# Patient Record
Sex: MALE | Race: WHITE | NOT HISPANIC OR LATINO | Employment: OTHER | URBAN - METROPOLITAN AREA
[De-identification: names, ages, dates, MRNs, and addresses within clinical notes are randomized per-mention and may not be internally consistent; named-entity substitution may affect disease eponyms.]

---

## 2023-10-05 PROBLEM — R77.9 ELEVATED SERUM PROTEIN LEVEL: Status: ACTIVE | Noted: 2021-01-11

## 2023-10-05 PROBLEM — R77.8 ELEVATED TOTAL PROTEIN: Status: ACTIVE | Noted: 2020-07-31

## 2023-10-05 PROBLEM — S06.0X0A CONCUSSION WITHOUT LOSS OF CONSCIOUSNESS: Status: ACTIVE | Noted: 2020-05-15

## 2023-10-05 PROBLEM — N02.9 BENIGN HEMATURIA: Status: ACTIVE | Noted: 2020-07-31

## 2023-10-05 PROBLEM — I10 HYPERTENSION: Status: ACTIVE | Noted: 2018-08-21

## 2023-10-05 PROBLEM — S13.4XXA WHIPLASH INJURY TO NECK: Status: ACTIVE | Noted: 2023-10-05

## 2023-10-05 PROBLEM — E78.5 HYPERLIPIDEMIA: Status: ACTIVE | Noted: 2020-01-16

## 2023-10-05 PROBLEM — R03.0 ELEVATED BLOOD PRESSURE READING: Status: ACTIVE | Noted: 2021-01-11

## 2023-10-05 PROBLEM — L08.9 LOCAL INFECTION OF WOUND: Status: ACTIVE | Noted: 2023-10-05

## 2023-10-05 PROBLEM — R06.09 DYSPNEA ON EXERTION: Status: ACTIVE | Noted: 2018-12-31

## 2023-10-05 PROBLEM — M17.11 OSTEOARTHRITIS OF RIGHT KNEE: Status: ACTIVE | Noted: 2019-08-02

## 2023-10-05 PROBLEM — I73.9 PERIPHERAL VASCULAR DISEASE (CMS-HCC): Status: ACTIVE | Noted: 2020-07-31

## 2023-10-05 PROBLEM — S00.81XA ABRASION OF FACE: Status: ACTIVE | Noted: 2020-05-22

## 2023-10-05 PROBLEM — F07.81 POSTCONCUSSION SYNDROME: Status: ACTIVE | Noted: 2023-10-05

## 2023-10-05 PROBLEM — S00.03XA CONTUSION OF SCALP: Status: ACTIVE | Noted: 2023-10-05

## 2023-10-05 PROBLEM — K21.9 GASTRO-ESOPHAGEAL REFLUX DISEASE WITHOUT ESOPHAGITIS: Status: ACTIVE | Noted: 2018-09-26

## 2023-10-05 PROBLEM — M19.90 OSTEOARTHRITIS: Status: ACTIVE | Noted: 2020-10-12

## 2023-10-05 PROBLEM — G44.229 CHRONIC TENSION-TYPE HEADACHE: Status: ACTIVE | Noted: 2023-10-05

## 2023-10-05 PROBLEM — L08.9 INFECTION OF SCALP: Status: ACTIVE | Noted: 2023-10-05

## 2023-10-05 PROBLEM — D72.829 LEUKOCYTOSIS: Status: ACTIVE | Noted: 2020-07-31

## 2023-10-05 PROBLEM — R77.9 ELEVATED BLOOD PROTEIN: Status: ACTIVE | Noted: 2020-10-12

## 2023-10-05 PROBLEM — N18.31 STAGE 3A CHRONIC KIDNEY DISEASE (CKD) (MULTI): Status: ACTIVE | Noted: 2023-10-05

## 2023-10-05 PROBLEM — R53.1 ASTHENIA: Status: ACTIVE | Noted: 2023-10-05

## 2023-10-05 PROBLEM — I73.9 CLAUDICATION (CMS-HCC): Status: ACTIVE | Noted: 2018-08-21

## 2023-10-05 PROBLEM — I71.40 ABDOMINAL AORTIC ANEURYSM (AAA) WITHOUT RUPTURE (CMS-HCC): Status: ACTIVE | Noted: 2018-12-30

## 2023-10-05 PROBLEM — R51.9 HEADACHE: Status: ACTIVE | Noted: 2023-10-05

## 2023-10-05 PROBLEM — R06.2 WHEEZE: Status: ACTIVE | Noted: 2018-12-31

## 2023-10-05 PROBLEM — H00.13 CHALAZION OF RIGHT EYELID: Status: ACTIVE | Noted: 2018-09-25

## 2023-10-05 PROBLEM — R73.01 IMPAIRED FASTING GLUCOSE: Status: ACTIVE | Noted: 2022-02-10

## 2023-10-05 PROBLEM — M18.12 ARTHRITIS OF CARPOMETACARPAL (CMC) JOINT OF LEFT THUMB: Status: ACTIVE | Noted: 2020-01-16

## 2023-10-05 PROBLEM — M25.561 RIGHT KNEE PAIN: Status: ACTIVE | Noted: 2019-08-02

## 2023-10-05 PROBLEM — E78.2 MIXED HYPERLIPIDEMIA: Status: ACTIVE | Noted: 2018-09-26

## 2023-10-05 PROBLEM — S01.80XA: Status: ACTIVE | Noted: 2020-05-15

## 2023-10-05 PROBLEM — R91.8 LUNG NODULES: Status: ACTIVE | Noted: 2019-01-24

## 2023-10-05 PROBLEM — R31.9 HEMATURIA: Status: ACTIVE | Noted: 2018-08-21

## 2023-10-05 PROBLEM — F17.290 CIGAR SMOKER: Status: ACTIVE | Noted: 2020-01-16

## 2023-10-05 PROBLEM — M85.80 OSTEOPENIA: Status: ACTIVE | Noted: 2020-07-31

## 2023-10-05 PROBLEM — R01.1 HEART MURMUR: Status: ACTIVE | Noted: 2018-12-30

## 2023-10-05 PROBLEM — B02.30 ZOSTER OPHTHALMICUS: Status: ACTIVE | Noted: 2021-01-11

## 2023-10-05 PROBLEM — F17.210 CIGARETTE SMOKER: Status: ACTIVE | Noted: 2023-10-05

## 2023-10-05 PROBLEM — S09.90XA INJURY OF HEAD: Status: ACTIVE | Noted: 2023-10-05

## 2023-10-05 PROBLEM — I70.1 RENAL ARTERY STENOSIS (CMS-HCC): Status: ACTIVE | Noted: 2018-12-31

## 2023-10-05 PROBLEM — H53.8 HAZY VISION: Status: ACTIVE | Noted: 2023-10-05

## 2023-10-05 PROBLEM — M79.645 PAIN OF LEFT THUMB: Status: ACTIVE | Noted: 2020-01-16

## 2023-10-05 PROBLEM — J43.9 COPD TYPE A (MULTI): Status: ACTIVE | Noted: 2021-05-12

## 2023-10-05 PROBLEM — J45.909 ASTHMA (HHS-HCC): Status: ACTIVE | Noted: 2023-10-05

## 2023-10-05 PROBLEM — I71.20 ANEURYSM OF THORACIC AORTA (CMS-HCC): Status: ACTIVE | Noted: 2019-03-19

## 2023-10-05 PROBLEM — T14.8XXA LOCAL INFECTION OF WOUND: Status: ACTIVE | Noted: 2023-10-05

## 2023-10-05 PROBLEM — Z85.118 PERSONAL HISTORY OF LUNG CANCER: Status: ACTIVE | Noted: 2021-05-12

## 2023-10-05 RX ORDER — ALBUTEROL SULFATE 0.63 MG/3ML
SOLUTION RESPIRATORY (INHALATION)
COMMUNITY
End: 2024-01-12 | Stop reason: WASHOUT

## 2023-10-05 RX ORDER — LOSARTAN POTASSIUM 100 MG/1
100 TABLET ORAL DAILY
COMMUNITY
Start: 2023-02-22 | End: 2024-06-03

## 2023-10-05 RX ORDER — ALBUTEROL SULFATE 90 UG/1
2 AEROSOL, METERED RESPIRATORY (INHALATION)
COMMUNITY
Start: 2022-02-25

## 2023-10-05 RX ORDER — AMLODIPINE BESYLATE 10 MG/1
10 TABLET ORAL DAILY
COMMUNITY
Start: 2023-02-22 | End: 2024-03-08 | Stop reason: SDUPTHER

## 2023-10-05 RX ORDER — ATORVASTATIN CALCIUM 20 MG/1
20 TABLET, FILM COATED ORAL DAILY
COMMUNITY
End: 2024-06-03

## 2023-10-12 ENCOUNTER — LAB (OUTPATIENT)
Dept: LAB | Facility: LAB | Age: 68
End: 2023-10-12
Payer: MEDICARE

## 2023-10-12 ENCOUNTER — OFFICE VISIT (OUTPATIENT)
Dept: PRIMARY CARE | Facility: CLINIC | Age: 68
End: 2023-10-12
Payer: MEDICARE

## 2023-10-12 ENCOUNTER — TELEPHONE (OUTPATIENT)
Dept: PRIMARY CARE | Facility: CLINIC | Age: 68
End: 2023-10-12

## 2023-10-12 VITALS
WEIGHT: 178.2 LBS | DIASTOLIC BLOOD PRESSURE: 74 MMHG | SYSTOLIC BLOOD PRESSURE: 130 MMHG | BODY MASS INDEX: 26.7 KG/M2 | HEART RATE: 86 BPM | OXYGEN SATURATION: 98 %

## 2023-10-12 DIAGNOSIS — E78.5 HYPERLIPIDEMIA, UNSPECIFIED HYPERLIPIDEMIA TYPE: ICD-10-CM

## 2023-10-12 DIAGNOSIS — I10 HYPERTENSION, UNSPECIFIED TYPE: Primary | ICD-10-CM

## 2023-10-12 DIAGNOSIS — R77.8 ELEVATED TOTAL PROTEIN: ICD-10-CM

## 2023-10-12 DIAGNOSIS — Z00.00 MEDICARE ANNUAL WELLNESS VISIT, SUBSEQUENT: ICD-10-CM

## 2023-10-12 DIAGNOSIS — I10 ESSENTIAL (PRIMARY) HYPERTENSION: Primary | ICD-10-CM

## 2023-10-12 DIAGNOSIS — Z12.5 PROSTATE CANCER SCREENING: Primary | ICD-10-CM

## 2023-10-12 DIAGNOSIS — R73.01 IMPAIRED FASTING GLUCOSE: ICD-10-CM

## 2023-10-12 DIAGNOSIS — I10 HYPERTENSION, UNSPECIFIED TYPE: ICD-10-CM

## 2023-10-12 LAB
ANION GAP SERPL CALC-SCNC: 16 MMOL/L
APPEARANCE UR: CLEAR
BILIRUB UR STRIP.AUTO-MCNC: NEGATIVE MG/DL
BUN SERPL-MCNC: 24 MG/DL (ref 8–25)
CALCIUM SERPL-MCNC: 9 MG/DL (ref 8.5–10.4)
CHLORIDE SERPL-SCNC: 105 MMOL/L (ref 97–107)
CO2 SERPL-SCNC: 20 MMOL/L (ref 24–31)
COLOR UR: NORMAL
CREAT SERPL-MCNC: 1.8 MG/DL (ref 0.4–1.6)
GFR SERPL CREATININE-BSD FRML MDRD: 41 ML/MIN/1.73M*2
GLUCOSE SERPL-MCNC: 107 MG/DL (ref 65–99)
GLUCOSE UR STRIP.AUTO-MCNC: NORMAL MG/DL
KETONES UR STRIP.AUTO-MCNC: NEGATIVE MG/DL
LEUKOCYTE ESTERASE UR QL STRIP.AUTO: NEGATIVE
NITRITE UR QL STRIP.AUTO: NEGATIVE
PH UR STRIP.AUTO: 5.5 [PH]
POTASSIUM SERPL-SCNC: 4.1 MMOL/L (ref 3.4–5.1)
PROT UR STRIP.AUTO-MCNC: NEGATIVE MG/DL
RBC # UR STRIP.AUTO: NEGATIVE /UL
SODIUM SERPL-SCNC: 141 MMOL/L (ref 133–145)
SP GR UR STRIP.AUTO: 1.01
UROBILINOGEN UR STRIP.AUTO-MCNC: NORMAL MG/DL

## 2023-10-12 PROCEDURE — 1160F RVW MEDS BY RX/DR IN RCRD: CPT | Performed by: STUDENT IN AN ORGANIZED HEALTH CARE EDUCATION/TRAINING PROGRAM

## 2023-10-12 PROCEDURE — 3078F DIAST BP <80 MM HG: CPT | Performed by: STUDENT IN AN ORGANIZED HEALTH CARE EDUCATION/TRAINING PROGRAM

## 2023-10-12 PROCEDURE — 90662 IIV NO PRSV INCREASED AG IM: CPT | Performed by: STUDENT IN AN ORGANIZED HEALTH CARE EDUCATION/TRAINING PROGRAM

## 2023-10-12 PROCEDURE — 1126F AMNT PAIN NOTED NONE PRSNT: CPT | Performed by: STUDENT IN AN ORGANIZED HEALTH CARE EDUCATION/TRAINING PROGRAM

## 2023-10-12 PROCEDURE — 99213 OFFICE O/P EST LOW 20 MIN: CPT | Performed by: STUDENT IN AN ORGANIZED HEALTH CARE EDUCATION/TRAINING PROGRAM

## 2023-10-12 PROCEDURE — 1159F MED LIST DOCD IN RCRD: CPT | Performed by: STUDENT IN AN ORGANIZED HEALTH CARE EDUCATION/TRAINING PROGRAM

## 2023-10-12 PROCEDURE — 80048 BASIC METABOLIC PNL TOTAL CA: CPT

## 2023-10-12 PROCEDURE — 3075F SYST BP GE 130 - 139MM HG: CPT | Performed by: STUDENT IN AN ORGANIZED HEALTH CARE EDUCATION/TRAINING PROGRAM

## 2023-10-12 PROCEDURE — 81003 URINALYSIS AUTO W/O SCOPE: CPT

## 2023-10-12 PROCEDURE — 1036F TOBACCO NON-USER: CPT | Performed by: STUDENT IN AN ORGANIZED HEALTH CARE EDUCATION/TRAINING PROGRAM

## 2023-10-12 PROCEDURE — G0008 ADMIN INFLUENZA VIRUS VAC: HCPCS | Performed by: STUDENT IN AN ORGANIZED HEALTH CARE EDUCATION/TRAINING PROGRAM

## 2023-10-12 PROCEDURE — 36415 COLL VENOUS BLD VENIPUNCTURE: CPT

## 2023-10-12 RX ORDER — CHLORTHALIDONE 25 MG/1
25 TABLET ORAL DAILY
COMMUNITY
Start: 2023-08-31 | End: 2024-03-08 | Stop reason: SDUPTHER

## 2023-10-12 ASSESSMENT — PATIENT HEALTH QUESTIONNAIRE - PHQ9
SUM OF ALL RESPONSES TO PHQ9 QUESTIONS 1 AND 2: 0
1. LITTLE INTEREST OR PLEASURE IN DOING THINGS: NOT AT ALL
2. FEELING DOWN, DEPRESSED OR HOPELESS: NOT AT ALL

## 2023-10-12 ASSESSMENT — ENCOUNTER SYMPTOMS
SHORTNESS OF BREATH: 0
PALPITATIONS: 0
WHEEZING: 0
HEADACHES: 0
FEVER: 0
CHILLS: 0

## 2023-10-12 ASSESSMENT — PAIN SCALES - GENERAL: PAINLEVEL: 0-NO PAIN

## 2023-10-12 NOTE — PROGRESS NOTES
GENERAL PROGRESS NOTE  Chief Complaint   Patient presents with    Hypertension     HPI  Facundo Youngblood is a 68 y.o. male here today for HTN follow up. Did not do pre-appt labs, he is going this AM to have done  BP today: 130/74  Last visit : 152/84  Meds: - Losartan 100mg, Amlodipine 10mg, chlorthalidone 25mg (added last visit 8/31)  Home BP's: not checking  eGFR 44, 51, 44  Cr 1.7, 1.5, 1.7  Still smoking, no interest in quitting completely but has been cutting back, smoking 6 swishers daily    Was recommended to have stress test by cardiology Dr. Berg. He cancelled initial appt and is not sure he will have done. Seeing Dr. Desir every 6 mos for surveillance after AAA repair      Vital signs   /74   Pulse 86   Wt 80.8 kg (178 lb 3.2 oz)   SpO2 98%   BMI 26.70 kg/m²      Current Outpatient Medications   Medication Sig Dispense Refill    albuterol (Proventil HFA) 90 mcg/actuation inhaler Inhale 2 puffs 4 times a day.      albuterol 0.63 mg/3 mL nebulizer solution as directed Inhalation      amLODIPine (Norvasc) 10 mg tablet Take 1 tablet (10 mg) by mouth once daily.      atorvastatin (Lipitor) 20 mg tablet Take 1 tablet (20 mg) by mouth once daily.      losartan (Cozaar) 100 mg tablet Take 1 tablet (100 mg) by mouth once daily.       No current facility-administered medications for this visit.       Review of Systems   Constitutional:  Negative for chills and fever.   Eyes:  Negative for visual disturbance.   Respiratory:  Negative for shortness of breath and wheezing.    Cardiovascular:  Negative for chest pain, palpitations and leg swelling.   Neurological:  Negative for headaches.        Physical Exam  Vitals and nursing note reviewed.   Constitutional:       Appearance: Normal appearance.   Eyes:      Extraocular Movements: Extraocular movements intact.      Conjunctiva/sclera:  Conjunctivae normal.      Pupils: Pupils are equal, round, and reactive to light.   Cardiovascular:      Rate and Rhythm: Normal rate and regular rhythm.   Pulmonary:      Effort: Pulmonary effort is normal.      Breath sounds: Wheezing (occas wheeze) present. No rhonchi or rales.   Musculoskeletal:      Right lower leg: No edema.      Left lower leg: No edema.   Skin:     General: Skin is warm and dry.   Neurological:      Mental Status: He is alert.   Psychiatric:         Mood and Affect: Mood normal.         Behavior: Behavior normal.         Assessment/Plan   1. Hypertension, unspecified type  Improved with addition of chlorthalidone. Tolerating well. Continue same medication regimen. Discussed stress test indications with him. Encouraged to reconsider having done. He will discuss with Dr. Desir    Follow up for CPE in 3 mos.          Orders Placed This Encounter   Procedures    Flu vaccine, quadrivalent, high-dose, preservative free, age 65y+ (FLUZONE)        Isabel Chiu MD  10/11/23  11:21 PM

## 2023-11-02 ENCOUNTER — ANCILLARY PROCEDURE (OUTPATIENT)
Dept: RADIOLOGY | Facility: CLINIC | Age: 68
End: 2023-11-02
Payer: MEDICARE

## 2023-11-02 ENCOUNTER — OFFICE VISIT (OUTPATIENT)
Dept: PRIMARY CARE | Facility: CLINIC | Age: 68
End: 2023-11-02
Payer: MEDICARE

## 2023-11-02 VITALS
DIASTOLIC BLOOD PRESSURE: 70 MMHG | OXYGEN SATURATION: 96 % | HEIGHT: 70 IN | SYSTOLIC BLOOD PRESSURE: 142 MMHG | BODY MASS INDEX: 24.62 KG/M2 | HEART RATE: 80 BPM | TEMPERATURE: 97.9 F | WEIGHT: 172 LBS

## 2023-11-02 DIAGNOSIS — M25.512 ACUTE PAIN OF LEFT SHOULDER: Primary | ICD-10-CM

## 2023-11-02 DIAGNOSIS — M25.512 ACUTE PAIN OF LEFT SHOULDER: ICD-10-CM

## 2023-11-02 PROCEDURE — 3077F SYST BP >= 140 MM HG: CPT | Performed by: STUDENT IN AN ORGANIZED HEALTH CARE EDUCATION/TRAINING PROGRAM

## 2023-11-02 PROCEDURE — 1125F AMNT PAIN NOTED PAIN PRSNT: CPT | Performed by: STUDENT IN AN ORGANIZED HEALTH CARE EDUCATION/TRAINING PROGRAM

## 2023-11-02 PROCEDURE — 99214 OFFICE O/P EST MOD 30 MIN: CPT | Performed by: STUDENT IN AN ORGANIZED HEALTH CARE EDUCATION/TRAINING PROGRAM

## 2023-11-02 PROCEDURE — 1159F MED LIST DOCD IN RCRD: CPT | Performed by: STUDENT IN AN ORGANIZED HEALTH CARE EDUCATION/TRAINING PROGRAM

## 2023-11-02 PROCEDURE — 1160F RVW MEDS BY RX/DR IN RCRD: CPT | Performed by: STUDENT IN AN ORGANIZED HEALTH CARE EDUCATION/TRAINING PROGRAM

## 2023-11-02 PROCEDURE — 73030 X-RAY EXAM OF SHOULDER: CPT | Mod: LT

## 2023-11-02 PROCEDURE — 3078F DIAST BP <80 MM HG: CPT | Performed by: STUDENT IN AN ORGANIZED HEALTH CARE EDUCATION/TRAINING PROGRAM

## 2023-11-02 ASSESSMENT — PAIN SCALES - GENERAL: PAINLEVEL: 10-WORST PAIN EVER

## 2023-11-02 NOTE — PROGRESS NOTES
"                                                                                                                 GENERAL PROGRESS NOTE    Chief Complaint   Patient presents with    Arm Pain     X 2 weeks- left     HPI  Facundo Youngblood is a 68 y.o. male that presents today for Lt arm pain. He is here today with his daughter. Had his flu vaccine here 10/12/2023 and had a small lump and some soreness that resolved after about 3 days. About a week ago started noticing pain in his upper Lt arm/shoulder area. He had some bruising over the area as well that resolved, he says the bruising was not in the same area that he received his flu vaccine. Pain has gradually worsened since onset. He is now hardly able to raise his arm above his head. He has been using a heating pad intermittently and does temporarily soothe the pain. The pain is keeping him from sleeping comfortably. He has a history of Lt shoulder surgery 2001 and has not had any major issues since that time.    Vital signs   /70 (BP Location: Right arm)   Pulse 80   Temp 36.6 °C (97.9 °F) (Temporal)   Ht 1.778 m (5' 10\")   Wt 78 kg (172 lb)   SpO2 96%   BMI 24.68 kg/m²      Current Outpatient Medications   Medication Sig Dispense Refill    albuterol (Proventil HFA) 90 mcg/actuation inhaler Inhale 2 puffs 4 times a day.      albuterol 0.63 mg/3 mL nebulizer solution as directed Inhalation      amLODIPine (Norvasc) 10 mg tablet Take 1 tablet (10 mg) by mouth once daily.      atorvastatin (Lipitor) 20 mg tablet Take 1 tablet (20 mg) by mouth once daily.      chlorthalidone (Hygroton) 25 mg tablet Take 1 tablet (25 mg) by mouth once daily.      losartan (Cozaar) 100 mg tablet Take 1 tablet (100 mg) by mouth once daily.       No current facility-administered medications for this visit.       Review of Systems   Constitutional:  Negative for chills and fever.   Respiratory:  Negative for cough and shortness of breath.    Cardiovascular:  Negative for " chest pain.   Musculoskeletal:  Negative for back pain and neck pain.        +Lt shoulder pain        Physical Exam  Constitutional:       General: He is not in acute distress.  Cardiovascular:      Rate and Rhythm: Normal rate and regular rhythm.   Pulmonary:      Effort: Pulmonary effort is normal.      Breath sounds: Normal breath sounds.   Musculoskeletal:      Right shoulder: Normal.      Left shoulder: No swelling, deformity or effusion. Tenderness: No bony tenderness..Decreased range of motion: pain and limitation on forward flexion and abduction past 90 degrees. Pain and limitation on external and internal rotation. Decreased strength: normal  strength bilaterally..      Cervical back: Normal.   Skin:     General: Skin is warm and dry.      Findings: No lesion or rash.   Neurological:      Mental Status: He is alert.         Assessment/Plan   1. Acute pain of left shoulder  Given the degree of his pain and limitations on ROM will get XR. Pain onset was days after receiving his influenza vaccine in the same arm but given the timeline and exam, I do not feel that the pain is a direct result of the injection. He does have a history of arthroscopy over 20 yrs ago in this shoulder without any major issues since. Discussed orthopedics referral given the degree and sudden onset of his pain and he agrees. Continue ice/heat for comfort, tylenol PRN. Discussed PRN ibuprofen for his pain and dosing recommendations. Advised caution with NSAID and to use only as directed given his CKD.  - XR shoulder left 2+ views; Future  - Referral to Orthopaedic Surgery; Future      No orders of the defined types were placed in this encounter.       Isabel Chiu MD  11/02/23  1:04 PM

## 2023-11-14 ASSESSMENT — ENCOUNTER SYMPTOMS
FEVER: 0
SHORTNESS OF BREATH: 0
COUGH: 0
CHILLS: 0
NECK PAIN: 0
BACK PAIN: 0

## 2024-01-12 ENCOUNTER — OFFICE VISIT (OUTPATIENT)
Dept: PRIMARY CARE | Facility: CLINIC | Age: 69
End: 2024-01-12
Payer: MEDICARE

## 2024-01-12 ENCOUNTER — TELEPHONE (OUTPATIENT)
Dept: PRIMARY CARE | Facility: CLINIC | Age: 69
End: 2024-01-12

## 2024-01-12 VITALS
DIASTOLIC BLOOD PRESSURE: 82 MMHG | WEIGHT: 177.2 LBS | SYSTOLIC BLOOD PRESSURE: 142 MMHG | BODY MASS INDEX: 26.25 KG/M2 | HEIGHT: 69 IN | HEART RATE: 77 BPM | OXYGEN SATURATION: 97 %

## 2024-01-12 DIAGNOSIS — F17.290 CIGAR SMOKER: ICD-10-CM

## 2024-01-12 DIAGNOSIS — I10 PRIMARY HYPERTENSION: ICD-10-CM

## 2024-01-12 DIAGNOSIS — R91.8 LUNG NODULES: ICD-10-CM

## 2024-01-12 DIAGNOSIS — Z00.00 ROUTINE GENERAL MEDICAL EXAMINATION AT HEALTH CARE FACILITY: Primary | ICD-10-CM

## 2024-01-12 DIAGNOSIS — Z87.891 FORMER HEAVY CIGARETTE SMOKER (20-39 PER DAY): ICD-10-CM

## 2024-01-12 DIAGNOSIS — N18.31 STAGE 3A CHRONIC KIDNEY DISEASE (CKD) (MULTI): ICD-10-CM

## 2024-01-12 DIAGNOSIS — E78.2 MIXED HYPERLIPIDEMIA: ICD-10-CM

## 2024-01-12 PROCEDURE — 3077F SYST BP >= 140 MM HG: CPT | Performed by: STUDENT IN AN ORGANIZED HEALTH CARE EDUCATION/TRAINING PROGRAM

## 2024-01-12 PROCEDURE — G0439 PPPS, SUBSEQ VISIT: HCPCS | Performed by: STUDENT IN AN ORGANIZED HEALTH CARE EDUCATION/TRAINING PROGRAM

## 2024-01-12 PROCEDURE — 1126F AMNT PAIN NOTED NONE PRSNT: CPT | Performed by: STUDENT IN AN ORGANIZED HEALTH CARE EDUCATION/TRAINING PROGRAM

## 2024-01-12 PROCEDURE — 1170F FXNL STATUS ASSESSED: CPT | Performed by: STUDENT IN AN ORGANIZED HEALTH CARE EDUCATION/TRAINING PROGRAM

## 2024-01-12 PROCEDURE — 99214 OFFICE O/P EST MOD 30 MIN: CPT | Performed by: STUDENT IN AN ORGANIZED HEALTH CARE EDUCATION/TRAINING PROGRAM

## 2024-01-12 PROCEDURE — 1159F MED LIST DOCD IN RCRD: CPT | Performed by: STUDENT IN AN ORGANIZED HEALTH CARE EDUCATION/TRAINING PROGRAM

## 2024-01-12 PROCEDURE — 3079F DIAST BP 80-89 MM HG: CPT | Performed by: STUDENT IN AN ORGANIZED HEALTH CARE EDUCATION/TRAINING PROGRAM

## 2024-01-12 PROCEDURE — 1160F RVW MEDS BY RX/DR IN RCRD: CPT | Performed by: STUDENT IN AN ORGANIZED HEALTH CARE EDUCATION/TRAINING PROGRAM

## 2024-01-12 ASSESSMENT — ENCOUNTER SYMPTOMS
SHORTNESS OF BREATH: 0
ABDOMINAL PAIN: 0
SORE THROAT: 0
DEPRESSION: 0
CHILLS: 0
DIARRHEA: 0
OCCASIONAL FEELINGS OF UNSTEADINESS: 1
PALPITATIONS: 0
DIZZINESS: 0
TROUBLE SWALLOWING: 0
HEADACHES: 0
WHEEZING: 0
FEVER: 0
COUGH: 0
DIFFICULTY URINATING: 0
LOSS OF SENSATION IN FEET: 0
BLOOD IN STOOL: 0
CONSTIPATION: 0
WEAKNESS: 0
WOUND: 0

## 2024-01-12 ASSESSMENT — PATIENT HEALTH QUESTIONNAIRE - PHQ9
2. FEELING DOWN, DEPRESSED OR HOPELESS: NOT AT ALL
1. LITTLE INTEREST OR PLEASURE IN DOING THINGS: NOT AT ALL
SUM OF ALL RESPONSES TO PHQ9 QUESTIONS 1 AND 2: 0

## 2024-01-12 ASSESSMENT — PAIN SCALES - GENERAL: PAINLEVEL: 0-NO PAIN

## 2024-01-12 ASSESSMENT — ACTIVITIES OF DAILY LIVING (ADL)
GROCERY_SHOPPING: INDEPENDENT
MANAGING_FINANCES: INDEPENDENT
DOING_HOUSEWORK: INDEPENDENT
TAKING_MEDICATION: INDEPENDENT
DRESSING: INDEPENDENT
BATHING: INDEPENDENT

## 2024-01-12 NOTE — PROGRESS NOTES
Subjective   Reason for Visit: Facundo Youngblood is an 68 y.o. male here for a Medicare Wellness visit.     Past Medical, Surgical, and Family History reviewed and updated in chart.    Reviewed all medications by prescribing practitioner or clinical pharmacist (such as prescriptions, OTCs, herbal therapies and supplements) and documented in the medical record.    HPI  No pre-appt labs available for review. Ordered, not drawn     INTERVAL HX/CURRENT CONCERNS:  Saw orthopedics for Lt shoulder pain. Was felt that his shoulder pain was exacerbated by the vaccine. Was told will get better over time and has. To follow up if worsens.    CHRONIC CONDITIONS:  HTN - losartan 100mg, amlodipine 10mg, chlorthalidone 25mg. Normal readings when checks at home. Just drank coffee and had cigar  HLD - atorvastatin 20mg  CKD3  IFG  Current smoker  RAJAT - s/p stenting  Thoracic aortic aneurysm - s/p repair, under Dr. Desir. Will have upcoming follow next month  Zoster ophthalmicus Rt eye, under ophthalmology    SPECIALISTS -   Vascular - Dr. 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 Gonzales  Lung cancer screenin2022  7mm and 2mm nodules Rt lung. REPEAT DUE  PSA - await labs    Cigars - swishers few a day, cigarettes 2PPD from age 17 to 2001, still smoking  EtOH - 2 to 3 drinks/week   Drugs - none    Patient Care Team:  Isabel Chiu MD as PCP - General (Family Medicine)     Review of Systems   Constitutional:  Negative for chills and fever.   HENT:  Negative for sore throat and trouble swallowing.    Eyes:  Negative for visual disturbance.   Respiratory:  Negative for cough, shortness of breath and wheezing.    Cardiovascular:  Negative for chest pain, palpitations and leg swelling.  "  Gastrointestinal:  Negative for abdominal pain, blood in stool, constipation and diarrhea.   Genitourinary:  Negative for difficulty urinating.   Skin:  Negative for rash and wound.   Neurological:  Negative for dizziness, weakness and headaches.       Objective   Vitals:  /82   Pulse 77   Ht 1.753 m (5' 9\")   Wt 80.4 kg (177 lb 3.2 oz)   SpO2 97%   BMI 26.17 kg/m²       Physical Exam  Vitals and nursing note reviewed.   Constitutional:       Appearance: Normal appearance.   HENT:      Head: Normocephalic and atraumatic.      Right Ear: Tympanic membrane, ear canal and external ear normal.      Left Ear: Tympanic membrane, ear canal and external ear normal.      Mouth/Throat:      Mouth: Mucous membranes are moist.      Pharynx: Oropharynx is clear.   Eyes:      Extraocular Movements: Extraocular movements intact.      Conjunctiva/sclera: Conjunctivae normal.      Pupils: Pupils are equal, round, and reactive to light.   Cardiovascular:      Rate and Rhythm: Normal rate and regular rhythm.      Heart sounds: Normal heart sounds. No murmur heard.  Pulmonary:      Effort: Pulmonary effort is normal.      Breath sounds: Normal breath sounds. No wheezing, rhonchi or rales.   Abdominal:      General: Abdomen is flat.      Palpations: Abdomen is soft.      Tenderness: There is no abdominal tenderness.   Musculoskeletal:         General: Normal range of motion.      Cervical back: Normal range of motion and neck supple.   Skin:     General: Skin is warm and dry.   Neurological:      Mental Status: He is alert.   Psychiatric:         Mood and Affect: Mood normal.         Behavior: Behavior normal.       Assessment/Plan   1. Routine general medical examination at health care facility  Normal exam  No acute issues  HM reviewed and discussed  Labs reviewed    2. Primary hypertension  Well controlled on current meds, no indication to change any meds at this time  Stable renal function  Repeat 6 mos.     3. Mixed " hyperlipidemia  Update lipid panel. Continue medication.     4. Stage 3a chronic kidney disease (CKD) (CMS/HCC)  Update renal function. Avoid NSAIDs. Maintain hydration. Monitor blood pressure.    5. Lung nodules  6. Cigar smoker  7. Former heavy cigarette smoker (20-39 per day)  - CT chest wo IV contrast; Future

## 2024-01-22 ENCOUNTER — APPOINTMENT (OUTPATIENT)
Dept: RADIOLOGY | Facility: HOSPITAL | Age: 69
End: 2024-01-22
Payer: MEDICARE

## 2024-01-23 ENCOUNTER — LAB (OUTPATIENT)
Dept: LAB | Facility: LAB | Age: 69
End: 2024-01-23
Payer: MEDICARE

## 2024-01-23 DIAGNOSIS — R73.01 IMPAIRED FASTING GLUCOSE: ICD-10-CM

## 2024-01-23 DIAGNOSIS — Z12.5 PROSTATE CANCER SCREENING: ICD-10-CM

## 2024-01-23 DIAGNOSIS — E78.5 HYPERLIPIDEMIA, UNSPECIFIED HYPERLIPIDEMIA TYPE: ICD-10-CM

## 2024-01-23 DIAGNOSIS — I10 HYPERTENSION, UNSPECIFIED TYPE: ICD-10-CM

## 2024-01-23 DIAGNOSIS — Z00.00 MEDICARE ANNUAL WELLNESS VISIT, SUBSEQUENT: ICD-10-CM

## 2024-01-23 LAB
ALBUMIN SERPL-MCNC: 4.5 G/DL (ref 3.5–5)
ALP BLD-CCNC: 76 U/L (ref 35–125)
ALT SERPL-CCNC: 9 U/L (ref 5–40)
ANION GAP SERPL CALC-SCNC: 14 MMOL/L
APPEARANCE UR: CLEAR
AST SERPL-CCNC: 13 U/L (ref 5–40)
BASOPHILS # BLD AUTO: 0.07 X10*3/UL (ref 0–0.1)
BASOPHILS NFR BLD AUTO: 0.4 %
BILIRUB SERPL-MCNC: 0.4 MG/DL (ref 0.1–1.2)
BILIRUB UR STRIP.AUTO-MCNC: NEGATIVE MG/DL
BUN SERPL-MCNC: 29 MG/DL (ref 8–25)
CALCIUM SERPL-MCNC: 9.7 MG/DL (ref 8.5–10.4)
CHLORIDE SERPL-SCNC: 101 MMOL/L (ref 97–107)
CHOLEST SERPL-MCNC: 178 MG/DL (ref 133–200)
CHOLEST/HDLC SERPL: 4 {RATIO}
CO2 SERPL-SCNC: 21 MMOL/L (ref 24–31)
COLOR UR: COLORLESS
CREAT SERPL-MCNC: 1.9 MG/DL (ref 0.4–1.6)
EGFRCR SERPLBLD CKD-EPI 2021: 38 ML/MIN/1.73M*2
EOSINOPHIL # BLD AUTO: 0.33 X10*3/UL (ref 0–0.7)
EOSINOPHIL NFR BLD AUTO: 1.9 %
ERYTHROCYTE [DISTWIDTH] IN BLOOD BY AUTOMATED COUNT: 12.3 % (ref 11.5–14.5)
GLUCOSE SERPL-MCNC: 94 MG/DL (ref 65–99)
GLUCOSE UR STRIP.AUTO-MCNC: NORMAL MG/DL
HCT VFR BLD AUTO: 42.1 % (ref 41–52)
HDLC SERPL-MCNC: 44 MG/DL
HGB BLD-MCNC: 14.1 G/DL (ref 13.5–17.5)
IMM GRANULOCYTES # BLD AUTO: 0.1 X10*3/UL (ref 0–0.7)
IMM GRANULOCYTES NFR BLD AUTO: 0.6 % (ref 0–0.9)
KETONES UR STRIP.AUTO-MCNC: NEGATIVE MG/DL
LDLC SERPL CALC-MCNC: 109 MG/DL (ref 65–130)
LEUKOCYTE ESTERASE UR QL STRIP.AUTO: NEGATIVE
LYMPHOCYTES # BLD AUTO: 1.58 X10*3/UL (ref 1.2–4.8)
LYMPHOCYTES NFR BLD AUTO: 9.2 %
MCH RBC QN AUTO: 34.8 PG (ref 26–34)
MCHC RBC AUTO-ENTMCNC: 33.5 G/DL (ref 32–36)
MCV RBC AUTO: 104 FL (ref 80–100)
MONOCYTES # BLD AUTO: 1.58 X10*3/UL (ref 0.1–1)
MONOCYTES NFR BLD AUTO: 9.2 %
NEUTROPHILS # BLD AUTO: 13.51 X10*3/UL (ref 1.2–7.7)
NEUTROPHILS NFR BLD AUTO: 78.7 %
NITRITE UR QL STRIP.AUTO: NEGATIVE
NRBC BLD-RTO: 0 /100 WBCS (ref 0–0)
PH UR STRIP.AUTO: 5 [PH]
PLATELET # BLD AUTO: 237 X10*3/UL (ref 150–450)
POTASSIUM SERPL-SCNC: 3.8 MMOL/L (ref 3.4–5.1)
PROT SERPL-MCNC: 7.3 G/DL (ref 5.9–7.9)
PROT UR STRIP.AUTO-MCNC: NEGATIVE MG/DL
PSA SERPL-MCNC: 1.8 NG/ML
RBC # BLD AUTO: 4.05 X10*6/UL (ref 4.5–5.9)
RBC # UR STRIP.AUTO: NEGATIVE /UL
SODIUM SERPL-SCNC: 136 MMOL/L (ref 133–145)
SP GR UR STRIP.AUTO: 1.01
TRIGL SERPL-MCNC: 127 MG/DL (ref 40–150)
UROBILINOGEN UR STRIP.AUTO-MCNC: NORMAL MG/DL
WBC # BLD AUTO: 17.2 X10*3/UL (ref 4.4–11.3)

## 2024-01-23 PROCEDURE — G0103 PSA SCREENING: HCPCS

## 2024-01-23 PROCEDURE — 85025 COMPLETE CBC W/AUTO DIFF WBC: CPT

## 2024-01-23 PROCEDURE — 80053 COMPREHEN METABOLIC PANEL: CPT

## 2024-01-23 PROCEDURE — 80061 LIPID PANEL: CPT

## 2024-01-23 PROCEDURE — 81003 URINALYSIS AUTO W/O SCOPE: CPT

## 2024-01-23 PROCEDURE — 36415 COLL VENOUS BLD VENIPUNCTURE: CPT

## 2024-01-26 ENCOUNTER — HOSPITAL ENCOUNTER (OUTPATIENT)
Dept: RADIOLOGY | Facility: HOSPITAL | Age: 69
Discharge: HOME | End: 2024-01-26
Payer: MEDICARE

## 2024-01-26 DIAGNOSIS — F17.290 CIGAR SMOKER: ICD-10-CM

## 2024-01-26 DIAGNOSIS — Z87.891 FORMER HEAVY CIGARETTE SMOKER (20-39 PER DAY): ICD-10-CM

## 2024-01-26 DIAGNOSIS — R91.8 LUNG NODULES: ICD-10-CM

## 2024-01-26 PROCEDURE — 71250 CT THORAX DX C-: CPT

## 2024-02-07 ENCOUNTER — TELEPHONE (OUTPATIENT)
Dept: PRIMARY CARE | Facility: CLINIC | Age: 69
End: 2024-02-07
Payer: COMMERCIAL

## 2024-02-07 DIAGNOSIS — N18.32 STAGE 3B CHRONIC KIDNEY DISEASE (MULTI): Primary | ICD-10-CM

## 2024-02-07 NOTE — TELEPHONE ENCOUNTER
Phone call with pt. Labs reviewed. Continues to have gradually declining renal function. Hx of RAJAT s/p stenting with stents noted to be patent on prior recent imaging. Discussed referral to nephrology for opinion given gradual decline with GFR now at 38/Cr 1.9. He is agreeable. Continue to stress smoking cessation and adequate fluid intake. Referral placed.

## 2024-03-08 ENCOUNTER — TELEPHONE (OUTPATIENT)
Dept: PRIMARY CARE | Facility: CLINIC | Age: 69
End: 2024-03-08
Payer: COMMERCIAL

## 2024-03-08 DIAGNOSIS — I10 PRIMARY HYPERTENSION: ICD-10-CM

## 2024-03-08 RX ORDER — AMLODIPINE BESYLATE 10 MG/1
10 TABLET ORAL DAILY
Qty: 90 TABLET | Refills: 0 | Status: SHIPPED | OUTPATIENT
Start: 2024-03-08 | End: 2024-06-03

## 2024-03-08 RX ORDER — CHLORTHALIDONE 25 MG/1
25 TABLET ORAL DAILY
Qty: 90 TABLET | Refills: 0 | Status: SHIPPED | OUTPATIENT
Start: 2024-03-08 | End: 2024-06-10

## 2024-03-08 NOTE — TELEPHONE ENCOUNTER
Patient needs refills on   Chlorthalidone 25 mg  Amlodipine 10 mg    Pharmacy: [facility]    Contact: Mobile

## 2024-05-08 ENCOUNTER — OFFICE VISIT (OUTPATIENT)
Dept: NEPHROLOGY | Facility: CLINIC | Age: 69
End: 2024-05-08
Payer: MEDICARE

## 2024-05-08 VITALS
RESPIRATION RATE: 16 BRPM | DIASTOLIC BLOOD PRESSURE: 72 MMHG | TEMPERATURE: 98.6 F | SYSTOLIC BLOOD PRESSURE: 142 MMHG | HEART RATE: 86 BPM | OXYGEN SATURATION: 99 % | WEIGHT: 178 LBS | BODY MASS INDEX: 25.48 KG/M2 | HEIGHT: 70 IN

## 2024-05-08 DIAGNOSIS — I25.10 CARDIOVASCULAR DISEASE: ICD-10-CM

## 2024-05-08 DIAGNOSIS — I71.30 RUPTURED ABDOMINAL AORTIC ANEURYSM (AAA), UNSPECIFIED PART (MULTI): ICD-10-CM

## 2024-05-08 DIAGNOSIS — N18.32 STAGE 3B CHRONIC KIDNEY DISEASE (MULTI): Primary | ICD-10-CM

## 2024-05-08 DIAGNOSIS — I70.1 RENAL ARTERY STENOSIS (CMS-HCC): ICD-10-CM

## 2024-05-08 DIAGNOSIS — I12.9 HYPERTENSIVE CHRONIC KIDNEY DISEASE WITH STAGE 1 THROUGH STAGE 4 CHRONIC KIDNEY DISEASE, OR UNSPECIFIED CHRONIC KIDNEY DISEASE: ICD-10-CM

## 2024-05-08 LAB
POC APPEARANCE, URINE: ABNORMAL
POC BILIRUBIN, URINE: NEGATIVE
POC BLOOD, URINE: ABNORMAL
POC COLOR, URINE: YELLOW
POC GLUCOSE, URINE: NEGATIVE MG/DL
POC KETONES, URINE: ABNORMAL MG/DL
POC LEUKOCYTES, URINE: NEGATIVE
POC NITRITE,URINE: NEGATIVE
POC PH, URINE: 5 PH
POC PROTEIN, URINE: NEGATIVE MG/DL
POC SPECIFIC GRAVITY, URINE: 1.02
POC UROBILINOGEN, URINE: 0.2 EU/DL

## 2024-05-08 PROCEDURE — 3077F SYST BP >= 140 MM HG: CPT | Performed by: INTERNAL MEDICINE

## 2024-05-08 PROCEDURE — 3078F DIAST BP <80 MM HG: CPT | Performed by: INTERNAL MEDICINE

## 2024-05-08 PROCEDURE — 1159F MED LIST DOCD IN RCRD: CPT | Performed by: INTERNAL MEDICINE

## 2024-05-08 PROCEDURE — 99205 OFFICE O/P NEW HI 60 MIN: CPT | Performed by: INTERNAL MEDICINE

## 2024-05-08 PROCEDURE — 1160F RVW MEDS BY RX/DR IN RCRD: CPT | Performed by: INTERNAL MEDICINE

## 2024-05-08 PROCEDURE — 81003 URINALYSIS AUTO W/O SCOPE: CPT | Performed by: INTERNAL MEDICINE

## 2024-05-08 RX ORDER — DAPAGLIFLOZIN 10 MG/1
10 TABLET, FILM COATED ORAL DAILY
Qty: 30 TABLET | Refills: 11 | Status: SHIPPED | OUTPATIENT
Start: 2024-05-08 | End: 2024-05-08

## 2024-05-08 RX ORDER — DAPAGLIFLOZIN 10 MG/1
10 TABLET, FILM COATED ORAL DAILY
Qty: 90 TABLET | Refills: 3 | Status: SHIPPED | OUTPATIENT
Start: 2024-05-08 | End: 2025-05-08

## 2024-05-08 NOTE — PROGRESS NOTES
"Subjective   Fcaundo Youngblood is a 68 y.o. male with a PMH of HTN, HLD, AAA, renal artery stenosis s/p repair, and lung nodules who is here for a new visit to follow up with CKD stage 3. February 2023 had AAA repair therapy, and 4 years ago had stented both renal arteries. 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 Denies any urinary symptoms at this time. Brother in law has CKD stage 5. He smokes 2 cigars a day. Otherwise, no acute complaints at this time      Chief Complaint:  Chronic Kidney Disease (CKD3 ref by Dr. Chiu)    Objective   Vitals reviewed.   Constitutional:       Appearance: Healthy appearance. Not in distress.   Pulmonary:      Effort: Pulmonary effort is normal.      Breath sounds: Bilateral Wheezing present.   Cardiovascular:      Normal rate. Regular rhythm.      Murmurs: There is a systolic murmur.   Edema:     Peripheral edema absent.   Abdominal:      General: There is no distension.      Palpations: Abdomen is soft.      Tenderness: There is no abdominal tenderness.   Musculoskeletal: Normal range of motion.         General: No tenderness. Neurological:      General: No focal deficit present.      Mental Status: Alert and oriented to person, place and time.         Lab Review:   Lab Results   Component Value Date     01/23/2024    K 3.8 01/23/2024     01/23/2024    CO2 21 (L) 01/23/2024    BUN 29 (H) 01/23/2024    CREATININE 1.90 (H) 01/23/2024    GLUCOSE 94 01/23/2024    CALCIUM 9.7 01/23/2024     No results found for: \"CKTOTAL\", \"CKMB\", \"CKMBINDEX\", \"TROPONINI\"  Lab Results   Component Value Date    WBC 17.2 (H) 01/23/2024    HGB 14.1 01/23/2024    HCT 42.1 01/23/2024     (H) 01/23/2024     01/23/2024     Lab Results   Component Value Date    CHOL 178 01/23/2024    TRIG 127 01/23/2024    HDL 44.0 01/23/2024       Assessment/Plan   The encounter diagnosis was Stage 3b chronic kidney disease (Multi).    # Chronic kidney disease stage 3b   - Baseline SCr between " 1.8-1.9 w/ GFR 38  - Most recent Scr [unfilled]   Lab Results   Component Value Date    CREATININE 1.90 (H) 01/23/2024        - Most likely related to significant vascular disease  - Urine dipstick in office today showed no Albumin, trace blood, no LE  - Prior urine analysis showed no protien in urine, no blood, no RBCs, no WBCs  - No previous kidney US  - No urine electrolytes  - Ordering CKD labs  - Will trial Farxiga 10 mg daily  - Follow up in 6 months      # BP - today at office 142/72 orthostatic vitals negative  - Does not check BP at home  - Current meds: Amlodipine 10 mg daily, chlorthalidone 25 mg daily, losartan 100 mg daily  - No Changes     #Anemia Hb 14.1 and stable    #(CKD)-MBD  - Ca 9.7, Phos not on file, PTH not on file, Vit D not on file    # [facility]  - Lipid profile showed cholesterol 178, HDL 44, , Triglycerides 127  - Currently on atorvastatin 20 mg daily    #Acidosis   - Bicarbonate 21, will continue to monitor    #Diabetes  - A1c 5.8 taken 9 months ago, currently stable    #Others  - No NSAIDs, no contrast as possible. If to be done- we recommend holding ACEi/ARBS/diuretics 24 hrs prior to contrast exposure and ensure appropriate hydration   - Ensure well hydration  - Limit salt in diet  - No smoking    Patient received CKD education and counseling    Discussed today: Starting Farxiga 10 mg and ordering CKD labs

## 2024-05-08 NOTE — PATIENT INSTRUCTIONS
Dear Facundo-it was nice meeting you nephrology clinic today we discussed the following    # Chronic kidney disease stage III most likely cardiovascular disease.  Continue losartan 100 mg.  Today we will start Farxiga 10 mg for cardiovascular and kidney benefits.  I put referral for the clinical pharmacy to assist with co-pay if any    # Hypertension-in good control.  Continue losartan 100 mg, amlodipine 10 mg, atorvastatin 20 mg, chlorthalidone 25 mg    # Avoid contrast as possible    Follow-up in 6 months with repeat blood work and urinalysis prior to next visit    Gely Wilcox MD, MS, NOEL TAVAREZ   Clinical  - [employer]   Nephrologist - [facility]

## 2024-05-14 NOTE — PROGRESS NOTES
Subjective   Patient ID: Facundo Youngblood is a 68 y.o. male who presents for CKD SGLT2    Referring Provider: Dr Wilcox    HPI  HPI: CKD stage 3b. last EGFR 38 sCr 1.9   HTN:  /72  Medications  Chlorthalidone 25mg every day  Amlodipine 10mg every day  Losartan 100mg every day     glucose:  A1C 5.8      HLD:    On statin? Atorva 20mg    Medications reviewed for appropriate dosing in setting of CKD  Recommended changes:  - no adjustments needed at this time    Objective     There were no vitals taken for this visit.     Labs  Lab Results   Component Value Date    BILITOT 0.4 01/23/2024    CALCIUM 9.7 01/23/2024    CO2 21 (L) 01/23/2024     01/23/2024    CREATININE 1.90 (H) 01/23/2024    GLUCOSE 94 01/23/2024    ALKPHOS 76 01/23/2024    K 3.8 01/23/2024    PROT 7.3 01/23/2024     01/23/2024    AST 13 01/23/2024    ALT 9 01/23/2024    BUN 29 (H) 01/23/2024    ANIONGAP 14 01/23/2024    ALBUMIN 4.5 01/23/2024     Lab Results   Component Value Date    TRIG 127 01/23/2024    CHOL 178 01/23/2024    LDLCALC 109 01/23/2024    HDL 44.0 01/23/2024     Lab Results   Component Value Date    HGBA1C 5.8 07/26/2023       Current Outpatient Medications on File Prior to Visit   Medication Sig Dispense Refill    albuterol (Proventil HFA) 90 mcg/actuation inhaler Inhale 2 puffs 4 times a day.      amLODIPine (Norvasc) 10 mg tablet Take 1 tablet (10 mg) by mouth once daily. 90 tablet 0    atorvastatin (Lipitor) 20 mg tablet Take 1 tablet (20 mg) by mouth once daily.      chlorthalidone (Hygroton) 25 mg tablet Take 1 tablet (25 mg) by mouth once daily. 90 tablet 0    Farxiga 10 mg Take 1 tablet (10 mg) by mouth once daily. 90 tablet 3    losartan (Cozaar) 100 mg tablet Take 1 tablet (100 mg) by mouth once daily.      [DISCONTINUED] dapagliflozin propanediol (Farxiga) 10 mg Take 1 tablet (10 mg) by mouth once daily. 30 tablet 11     No current facility-administered medications on file prior to visit.         Assessment/Plan   PATIENT EDUCATION/DISCUSSION:  - Counseled patient on MOA, expectations, side effects, duration of therapy, contraindications, administration, and monitoring parameters  - Answered all patient questions and concerns  - normal monthly copay appears to be $11/month. Unclear if patient has met deductible yet this year. Will call back if any issues with deductible cost at pharmacy at which point we will look into available patient assistance.  _______________________________________________________________________  PLAN  1. START farxiga 10mg qd  2. Prescription sent to [facility] pharmacy for assistance on authorization and copay. Medication will be mailed to patient.    Addy Baugh, PharmD    Continue all meds under the continuation of care with the referring provider and clinical pharmacy team.

## 2024-05-15 ENCOUNTER — TELEMEDICINE (OUTPATIENT)
Dept: PHARMACY | Facility: HOSPITAL | Age: 69
End: 2024-05-15
Payer: COMMERCIAL

## 2024-05-15 DIAGNOSIS — I71.30 RUPTURED ABDOMINAL AORTIC ANEURYSM (AAA), UNSPECIFIED PART (MULTI): ICD-10-CM

## 2024-05-15 DIAGNOSIS — I25.10 CARDIOVASCULAR DISEASE: ICD-10-CM

## 2024-05-15 DIAGNOSIS — I12.9 HYPERTENSIVE CHRONIC KIDNEY DISEASE WITH STAGE 1 THROUGH STAGE 4 CHRONIC KIDNEY DISEASE, OR UNSPECIFIED CHRONIC KIDNEY DISEASE: ICD-10-CM

## 2024-05-15 DIAGNOSIS — I70.1 RENAL ARTERY STENOSIS (CMS-HCC): ICD-10-CM

## 2024-05-15 DIAGNOSIS — N18.32 STAGE 3B CHRONIC KIDNEY DISEASE (MULTI): ICD-10-CM

## 2024-06-01 DIAGNOSIS — I10 PRIMARY HYPERTENSION: ICD-10-CM

## 2024-06-01 DIAGNOSIS — E78.2 MIXED HYPERLIPIDEMIA: ICD-10-CM

## 2024-06-03 RX ORDER — AMLODIPINE BESYLATE 10 MG/1
10 TABLET ORAL DAILY
Qty: 90 TABLET | Refills: 1 | Status: SHIPPED | OUTPATIENT
Start: 2024-06-03

## 2024-06-03 RX ORDER — ATORVASTATIN CALCIUM 20 MG/1
20 TABLET, FILM COATED ORAL DAILY
Qty: 90 TABLET | Refills: 1 | Status: SHIPPED | OUTPATIENT
Start: 2024-06-03

## 2024-06-03 RX ORDER — LOSARTAN POTASSIUM 100 MG/1
100 TABLET ORAL DAILY
Qty: 90 TABLET | Refills: 1 | Status: SHIPPED | OUTPATIENT
Start: 2024-06-03

## 2024-06-10 DIAGNOSIS — I10 PRIMARY HYPERTENSION: ICD-10-CM

## 2024-06-10 RX ORDER — CHLORTHALIDONE 25 MG/1
25 TABLET ORAL DAILY
Qty: 90 TABLET | Refills: 1 | Status: SHIPPED | OUTPATIENT
Start: 2024-06-10

## 2024-07-12 ENCOUNTER — TELEPHONE (OUTPATIENT)
Dept: PRIMARY CARE | Facility: CLINIC | Age: 69
End: 2024-07-12

## 2024-07-12 ENCOUNTER — APPOINTMENT (OUTPATIENT)
Dept: PRIMARY CARE | Facility: CLINIC | Age: 69
End: 2024-07-12
Payer: MEDICARE

## 2024-07-12 VITALS
TEMPERATURE: 98.2 F | OXYGEN SATURATION: 98 % | DIASTOLIC BLOOD PRESSURE: 76 MMHG | HEART RATE: 74 BPM | WEIGHT: 170 LBS | BODY MASS INDEX: 24.74 KG/M2 | SYSTOLIC BLOOD PRESSURE: 134 MMHG

## 2024-07-12 DIAGNOSIS — I10 PRIMARY HYPERTENSION: ICD-10-CM

## 2024-07-12 DIAGNOSIS — Z00.00 MEDICARE ANNUAL WELLNESS VISIT, SUBSEQUENT: Primary | ICD-10-CM

## 2024-07-12 DIAGNOSIS — I10 PRIMARY HYPERTENSION: Primary | ICD-10-CM

## 2024-07-12 DIAGNOSIS — Z12.5 PROSTATE CANCER SCREENING: ICD-10-CM

## 2024-07-12 DIAGNOSIS — E78.2 MIXED HYPERLIPIDEMIA: ICD-10-CM

## 2024-07-12 DIAGNOSIS — R73.01 IMPAIRED FASTING GLUCOSE: ICD-10-CM

## 2024-07-12 DIAGNOSIS — J43.9 PULMONARY EMPHYSEMA, UNSPECIFIED EMPHYSEMA TYPE (MULTI): ICD-10-CM

## 2024-07-12 DIAGNOSIS — N18.32 STAGE 3B CHRONIC KIDNEY DISEASE (MULTI): ICD-10-CM

## 2024-07-12 PROCEDURE — 3075F SYST BP GE 130 - 139MM HG: CPT | Performed by: STUDENT IN AN ORGANIZED HEALTH CARE EDUCATION/TRAINING PROGRAM

## 2024-07-12 PROCEDURE — 1158F ADVNC CARE PLAN TLK DOCD: CPT | Performed by: STUDENT IN AN ORGANIZED HEALTH CARE EDUCATION/TRAINING PROGRAM

## 2024-07-12 PROCEDURE — 1160F RVW MEDS BY RX/DR IN RCRD: CPT | Performed by: STUDENT IN AN ORGANIZED HEALTH CARE EDUCATION/TRAINING PROGRAM

## 2024-07-12 PROCEDURE — 1159F MED LIST DOCD IN RCRD: CPT | Performed by: STUDENT IN AN ORGANIZED HEALTH CARE EDUCATION/TRAINING PROGRAM

## 2024-07-12 PROCEDURE — 99214 OFFICE O/P EST MOD 30 MIN: CPT | Performed by: STUDENT IN AN ORGANIZED HEALTH CARE EDUCATION/TRAINING PROGRAM

## 2024-07-12 PROCEDURE — 3078F DIAST BP <80 MM HG: CPT | Performed by: STUDENT IN AN ORGANIZED HEALTH CARE EDUCATION/TRAINING PROGRAM

## 2024-07-12 PROCEDURE — 1123F ACP DISCUSS/DSCN MKR DOCD: CPT | Performed by: STUDENT IN AN ORGANIZED HEALTH CARE EDUCATION/TRAINING PROGRAM

## 2024-07-12 ASSESSMENT — ENCOUNTER SYMPTOMS
CHILLS: 0
HEADACHES: 0
SHORTNESS OF BREATH: 0
PALPITATIONS: 0
FEVER: 0

## 2024-07-12 NOTE — PROGRESS NOTES
GENERAL PROGRESS NOTE    Chief Complaint   Patient presents with    Blood Pressure Check       HPI  Facundo Youngblood is a 69 y.o. male that presents today for HTN check.    No pre-draw     CHRONIC CONDITIONS:  HTN - losartan 100mg, amlodipine 10mg, chlorthalidone 25mg  HLD - atorvastatin 20mg  CKD3  IFG  Current smoker  RAJAT - s/p stenting  Thoracic aortic aneurysm - s/p repair, under Dr. Desir. Will have upcoming follow next month  Zoster ophthalmicus Rt eye, under ophthalmology    #CKD - recent visit with nephrology. Started on Farxiga 10 mg. Felt a little wobbly when first started. Decreased to 5 mg for a bit and then increased to 10mg. Not having any side effects. Has upcoming appt  11/2024    #HTN   losartan 100 mg, amlodipine 10 mg, chlorthalidone 25mg  BP today - 144/78  Home readings - no checking    Still smoking but has cut back. 10 swishers a day. Did use chantix in the past and did not like side effects. Was hypnotized for cigarette and never smoked cigarette again. Thought about laser treatment. Declines NRT, chantix, wellbutrin, BH    Vital signs   /76   Pulse 74   Temp 36.8 °C (98.2 °F) (Temporal)   Wt 77.1 kg (170 lb)   SpO2 98%   BMI 24.74 kg/m²      Current Outpatient Medications   Medication Sig Dispense Refill    albuterol (Proventil HFA) 90 mcg/actuation inhaler Inhale 2 puffs 4 times a day.      amLODIPine (Norvasc) 10 mg tablet TAKE 1 TABLET(10 MG) BY MOUTH DAILY 90 tablet 1    atorvastatin (Lipitor) 20 mg tablet TAKE 1 TABLET BY MOUTH DAILY 90 tablet 1    chlorthalidone (Hygroton) 25 mg tablet TAKE 1 TABLET BY MOUTH DAILY 90 tablet 1    Farxiga 10 mg Take 1 tablet (10 mg) by mouth once daily. 90 tablet 3    losartan (Cozaar) 100 mg tablet TAKE 1 TABLET BY MOUTH DAILY 90 tablet 1     No current facility-administered medications for this visit.       Review of Systems    Constitutional:  Negative for chills and fever.   Eyes:  Negative for visual disturbance.   Respiratory:  Negative for shortness of breath.    Cardiovascular:  Negative for chest pain, palpitations and leg swelling.   Neurological:  Negative for headaches.        Physical Exam  Constitutional:       Appearance: Normal appearance.   Cardiovascular:      Rate and Rhythm: Normal rate and regular rhythm.      Heart sounds: Murmur heard.   Pulmonary:      Effort: Pulmonary effort is normal.      Breath sounds: Wheezing (bilaterally) present. No rhonchi or rales.   Musculoskeletal:      Right lower leg: No edema.      Left lower leg: No edema.   Neurological:      Mental Status: He is alert.         Assessment/Plan   1. Primary hypertension  Controlled on current meds, no change in management.   Labs ordered by nephrology, will have drawn  Continue dietary efforts and physical activity  Advised smoking cessation       2. Stage 3b chronic kidney disease (Multi)  Established with nephrology. Will have labs drawn. Tolerating farxiga without issue. Discussed increased risk of  infection and to make appt if develops symptoms. Maintain hydration, he continues to work on this.     3. Pulmonary emphysema, unspecified emphysema type (Multi)  TRINIDAD PRN. Does not feel breathing has worsened. Rarely using albuterol. Declines PFTs. Continue to advise smoking cessation. He is not intersted at this time.       Isabel Chiu MD  07/12/24  8:44 AM

## 2024-10-02 DIAGNOSIS — I71.30 RUPTURED ABDOMINAL AORTIC ANEURYSM (AAA), UNSPECIFIED PART (MULTI): ICD-10-CM

## 2024-10-02 DIAGNOSIS — N18.32 STAGE 3B CHRONIC KIDNEY DISEASE (MULTI): ICD-10-CM

## 2024-10-02 DIAGNOSIS — I12.9 HYPERTENSIVE CHRONIC KIDNEY DISEASE WITH STAGE 1 THROUGH STAGE 4 CHRONIC KIDNEY DISEASE, OR UNSPECIFIED CHRONIC KIDNEY DISEASE: ICD-10-CM

## 2024-10-02 DIAGNOSIS — I25.10 CARDIOVASCULAR DISEASE: ICD-10-CM

## 2024-10-02 DIAGNOSIS — I70.1 RENAL ARTERY STENOSIS (CMS-HCC): ICD-10-CM

## 2024-10-09 ENCOUNTER — LAB (OUTPATIENT)
Dept: LAB | Facility: LAB | Age: 69
End: 2024-10-09
Payer: MEDICARE

## 2024-10-09 DIAGNOSIS — I71.30 RUPTURED ABDOMINAL AORTIC ANEURYSM (AAA), UNSPECIFIED PART (MULTI): ICD-10-CM

## 2024-10-09 DIAGNOSIS — I12.9 HYPERTENSIVE CHRONIC KIDNEY DISEASE WITH STAGE 1 THROUGH STAGE 4 CHRONIC KIDNEY DISEASE, OR UNSPECIFIED CHRONIC KIDNEY DISEASE: ICD-10-CM

## 2024-10-09 DIAGNOSIS — N18.32 STAGE 3B CHRONIC KIDNEY DISEASE (MULTI): ICD-10-CM

## 2024-10-09 DIAGNOSIS — I70.1 RENAL ARTERY STENOSIS (CMS-HCC): ICD-10-CM

## 2024-10-09 DIAGNOSIS — I25.10 CARDIOVASCULAR DISEASE: ICD-10-CM

## 2024-10-09 LAB
ALBUMIN SERPL BCP-MCNC: 4.2 G/DL (ref 3.4–5)
ANION GAP SERPL CALCULATED.3IONS-SCNC: 14 MMOL/L (ref 10–20)
BUN SERPL-MCNC: 33 MG/DL (ref 6–23)
CALCIUM SERPL-MCNC: 9.3 MG/DL (ref 8.6–10.3)
CHLORIDE SERPL-SCNC: 104 MMOL/L (ref 98–107)
CO2 SERPL-SCNC: 23 MMOL/L (ref 21–32)
CREAT SERPL-MCNC: 2.09 MG/DL (ref 0.5–1.3)
CREAT UR-MCNC: 145.8 MG/DL (ref 20–370)
EGFRCR SERPLBLD CKD-EPI 2021: 34 ML/MIN/1.73M*2
ERYTHROCYTE [DISTWIDTH] IN BLOOD BY AUTOMATED COUNT: 12.1 % (ref 11.5–14.5)
FERRITIN SERPL-MCNC: 104 NG/ML (ref 20–300)
GLUCOSE SERPL-MCNC: 88 MG/DL (ref 74–99)
HCT VFR BLD AUTO: 40 % (ref 41–52)
HGB BLD-MCNC: 13.8 G/DL (ref 13.5–17.5)
IRON SATN MFR SERPL: 27 % (ref 25–45)
IRON SERPL-MCNC: 89 UG/DL (ref 35–150)
MAGNESIUM SERPL-MCNC: 1.84 MG/DL (ref 1.6–2.4)
MCH RBC QN AUTO: 35.7 PG (ref 26–34)
MCHC RBC AUTO-ENTMCNC: 34.5 G/DL (ref 32–36)
MCV RBC AUTO: 103 FL (ref 80–100)
MICROALBUMIN UR-MCNC: 12.1 MG/L
MICROALBUMIN/CREAT UR: 8.3 UG/MG CREAT
NRBC BLD-RTO: 0 /100 WBCS (ref 0–0)
PHOSPHATE SERPL-MCNC: 3.5 MG/DL (ref 2.5–4.9)
PLATELET # BLD AUTO: 201 X10*3/UL (ref 150–450)
POTASSIUM SERPL-SCNC: 3.8 MMOL/L (ref 3.5–5.3)
PTH-INTACT SERPL-MCNC: 86 PG/ML (ref 18.5–88)
RBC # BLD AUTO: 3.87 X10*6/UL (ref 4.5–5.9)
SODIUM SERPL-SCNC: 137 MMOL/L (ref 136–145)
TIBC SERPL-MCNC: 326 UG/DL (ref 240–445)
UIBC SERPL-MCNC: 237 UG/DL (ref 110–370)
WBC # BLD AUTO: 13.9 X10*3/UL (ref 4.4–11.3)

## 2024-10-09 PROCEDURE — 36415 COLL VENOUS BLD VENIPUNCTURE: CPT

## 2024-10-09 PROCEDURE — 82570 ASSAY OF URINE CREATININE: CPT

## 2024-10-09 PROCEDURE — 83550 IRON BINDING TEST: CPT

## 2024-10-09 PROCEDURE — 83540 ASSAY OF IRON: CPT

## 2024-10-09 PROCEDURE — 83735 ASSAY OF MAGNESIUM: CPT

## 2024-10-09 PROCEDURE — 80069 RENAL FUNCTION PANEL: CPT

## 2024-10-09 PROCEDURE — 82043 UR ALBUMIN QUANTITATIVE: CPT

## 2024-10-09 PROCEDURE — 83970 ASSAY OF PARATHORMONE: CPT

## 2024-10-09 PROCEDURE — 82306 VITAMIN D 25 HYDROXY: CPT

## 2024-10-09 PROCEDURE — 85027 COMPLETE CBC AUTOMATED: CPT

## 2024-10-09 PROCEDURE — 82610 CYSTATIN C: CPT

## 2024-10-09 PROCEDURE — 82728 ASSAY OF FERRITIN: CPT

## 2024-10-10 LAB — 25(OH)D3 SERPL-MCNC: 31 NG/ML (ref 30–100)

## 2024-10-11 LAB
CYSTATIN C SERPL-MCNC: 2.8 MG/L (ref 0.5–1.2)
GFR/BSA.PRED SERPLBLD CYS-BASED-ARV: 19 ML/MIN/BSA

## 2024-10-14 ENCOUNTER — APPOINTMENT (OUTPATIENT)
Dept: NEPHROLOGY | Facility: CLINIC | Age: 69
End: 2024-10-14
Payer: MEDICARE

## 2024-10-14 VITALS
WEIGHT: 176.2 LBS | BODY MASS INDEX: 25.22 KG/M2 | OXYGEN SATURATION: 94 % | RESPIRATION RATE: 16 BRPM | SYSTOLIC BLOOD PRESSURE: 118 MMHG | HEIGHT: 70 IN | TEMPERATURE: 97.8 F | HEART RATE: 73 BPM | DIASTOLIC BLOOD PRESSURE: 71 MMHG

## 2024-10-14 DIAGNOSIS — I25.10 CARDIOVASCULAR DISEASE: ICD-10-CM

## 2024-10-14 DIAGNOSIS — I12.9 HYPERTENSIVE CHRONIC KIDNEY DISEASE WITH STAGE 1 THROUGH STAGE 4 CHRONIC KIDNEY DISEASE, OR UNSPECIFIED CHRONIC KIDNEY DISEASE: ICD-10-CM

## 2024-10-14 DIAGNOSIS — N18.32 STAGE 3B CHRONIC KIDNEY DISEASE (MULTI): Primary | ICD-10-CM

## 2024-10-14 DIAGNOSIS — I70.1 RENAL ARTERY STENOSIS (CMS-HCC): ICD-10-CM

## 2024-10-14 DIAGNOSIS — I71.30 RUPTURED ABDOMINAL AORTIC ANEURYSM (AAA), UNSPECIFIED PART (MULTI): ICD-10-CM

## 2024-10-14 PROCEDURE — 1123F ACP DISCUSS/DSCN MKR DOCD: CPT | Performed by: INTERNAL MEDICINE

## 2024-10-14 PROCEDURE — 99215 OFFICE O/P EST HI 40 MIN: CPT | Performed by: INTERNAL MEDICINE

## 2024-10-14 PROCEDURE — 3008F BODY MASS INDEX DOCD: CPT | Performed by: INTERNAL MEDICINE

## 2024-10-14 PROCEDURE — 3074F SYST BP LT 130 MM HG: CPT | Performed by: INTERNAL MEDICINE

## 2024-10-14 PROCEDURE — 1159F MED LIST DOCD IN RCRD: CPT | Performed by: INTERNAL MEDICINE

## 2024-10-14 PROCEDURE — 3078F DIAST BP <80 MM HG: CPT | Performed by: INTERNAL MEDICINE

## 2024-10-14 RX ORDER — DAPAGLIFLOZIN 10 MG/1
10 TABLET, FILM COATED ORAL DAILY
Qty: 90 TABLET | Refills: 3 | Status: ON HOLD | OUTPATIENT
Start: 2024-10-14 | End: 2025-10-14

## 2024-10-14 NOTE — PATIENT INSTRUCTIONS
Dear Facundo-it was nice meeting you nephrology clinic today we discussed the following    # Chronic kidney disease stage III most likely cardiovascular disease.  Continue losartan 100 mg and Farxiga 10 mg for heart and kidney benefits.  I put referral for the clinical pharmacy to assist with co-pay if any    # Hypertension-in good control.  Continue losartan 100 mg, amlodipine 10 mg, atorvastatin 20 mg, chlorthalidone 25 mg    # Avoid contrast as possible    # Please discuss with vascular surgery possible aspirin for life since we have kidney stents    # Smoking is bad for your kidneys    Follow-up in 6 months with repeat blood work and urinalysis prior to next visit    Gely Wilcox MD, MS, NOEL TAVAREZ   Clinical  - [facility] [facility] [employer]   Nephrologist - [facility]

## 2024-10-14 NOTE — PROGRESS NOTES
Subjective   Facundo Youngblood is a 69 y.o. male with a PMH of HTN, HLD, AAA, renal artery stenosis s/p repair, and lung nodules came in for a follow up visit.  Patient feels well overall he denies any acute concerns or complaints.  Blood pressure is acceptable today.  Kidney function is relatively stable with creatinine of 2.09 and GFR of 34 this month with baseline.  No electrolyte imbalance.  No acidosis.  No proteinuria.  He continues to smoke (cigar) and previously been exposed to asbestosis/coal we discussed smoking cessation benefits and offered help.  He been feeling very well since he started Farxiga.  He started with 5 mg and went up to 10 mg daily.  He said when he started 10 mg he felt little woozy but cut down back to 5 mg and went back up to 10mg but now he is feeling fine and better on 10 mg.  Otherwise he reports good urine output.  We discussed his renal artery stents and mentioned that he should check with his vascular team if he should be on on antiplatelet therapy including aspirin.  He is on statin therapy.      Per Prior Note     Facundo Youngblood, a 70 y/o PMH of HTN, HLD, AAA, renal artery stenosis s/p repair, and lung nodules who is here for a new visit to follow up with CKD stage 3. February 2023 had AAA repair therapy, and 4 years ago had stented both renal arteries. 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 Denies any urinary symptoms at this time. Brother in law has CKD stage 5. He smokes 2 cigars a day. Otherwise, no acute complaints at this time.     Chief Complaint:  Follow-up    Objective   Vitals reviewed.   Constitutional:       Appearance: Healthy appearance. Not in distress.   Pulmonary:      Effort: Pulmonary effort is normal.      Breath sounds: Bilateral Wheezing present.   Cardiovascular:      Normal rate.   Edema:     Peripheral edema absent.   Abdominal:      General: There is no distension.      Palpations: Abdomen is soft.      Tenderness: There is no abdominal tenderness.  "  Musculoskeletal: Normal range of motion.         General: No tenderness. Skin:     General: Skin is warm.   Neurological:      General: No focal deficit present.      Mental Status: Alert and oriented to person, place and time.         Lab Review:   Lab Results   Component Value Date     10/09/2024    K 3.8 10/09/2024     10/09/2024    CO2 23 10/09/2024    BUN 33 (H) 10/09/2024    CREATININE 2.09 (H) 10/09/2024    GLUCOSE 88 10/09/2024    CALCIUM 9.3 10/09/2024     No results found for: \"CKTOTAL\", \"CKMB\", \"CKMBINDEX\", \"TROPONINI\"  Lab Results   Component Value Date    WBC 13.9 (H) 10/09/2024    HGB 13.8 10/09/2024    HCT 40.0 (L) 10/09/2024     (H) 10/09/2024     10/09/2024     Lab Results   Component Value Date    CHOL 178 01/23/2024    TRIG 127 01/23/2024    HDL 44.0 01/23/2024       Assessment/Plan   The primary encounter diagnosis was Stage 3b chronic kidney disease (Multi). Diagnoses of Hypertensive chronic kidney disease with stage 1 through stage 4 chronic kidney disease, or unspecified chronic kidney disease, Cardiovascular disease, Ruptured abdominal aortic aneurysm (AAA), unspecified part (Multi), and Renal artery stenosis (CMS-[facility]) were also pertinent to this visit.    # Chronic kidney disease stage 3b   # History of bilateral renal stents.  - Baseline SCr between 1.8-1.9 w/ GFR 38  - Most recent Scr [unfilled]   Lab Results   Component Value Date    CREATININE 2.09 (H) 10/09/2024      Blood pressure is acceptable today.  Kidney function is relatively stable with creatinine of 2.09 and GFR of 34 this month with baseline.  No electrolyte imbalance.  No acidosis.  No proteinuria.  - Most likely related to significant vascular disease/cardiac  -Continue losartan 100 mg and Farxiga 10 mg.  -Referral placed for clinical pharmacy to assist with co-pay if any.  -We discussed if he should be on antiplatelet therapy including aspirin given history of renal stents.  Asked to reach " out to his vascular team.    # BP - today at office 118/71 orthostatic vitals negative  - Does not check BP at home  - Current meds: Amlodipine 10 mg daily, chlorthalidone 25 mg daily, losartan 100 mg daily  - No Changes     #Anemia-no acute concern  -Hgb 13.8 in October 2024    #(CKD)-MBD  -PTH and vitamin D within normal limit    # [facility]  - Currently on atorvastatin 20 mg daily  -We discussed if he should be on antiplatelet therapy including aspirin given history of renal stents.  Asked to reach out to his vascular team.    #Acidosis   - None    #Diabetes  - A1c 5.8 ~ 1 year ago    #Others  - No NSAIDs, no contrast as possible. If to be done- we recommend holding ACEi/ARBS/diuretics 24 hrs prior to contrast exposure and ensure appropriate hydration   - Ensure well hydration  - Limit salt in diet  - No smoking; smoked and discussed smoking cessation benefit.     Return to clinic/follow-up in 6 months with repeat blood work and UA prior to next visit.    Discussed with Dr. Wilcox.     Rell Castellanos   PGY3 [facility]

## 2024-10-16 ENCOUNTER — APPOINTMENT (OUTPATIENT)
Dept: RADIOLOGY | Facility: HOSPITAL | Age: 69
DRG: 799 | End: 2024-10-16
Payer: MEDICARE

## 2024-10-16 ENCOUNTER — HOSPITAL ENCOUNTER (EMERGENCY)
Facility: HOSPITAL | Age: 69
Discharge: OTHER INSTITUTION | End: 2024-10-16
Attending: EMERGENCY MEDICINE
Payer: MEDICARE

## 2024-10-16 ENCOUNTER — ANESTHESIA (OUTPATIENT)
Dept: OPERATING ROOM | Facility: HOSPITAL | Age: 69
End: 2024-10-16
Payer: MEDICARE

## 2024-10-16 ENCOUNTER — APPOINTMENT (OUTPATIENT)
Dept: RADIOLOGY | Facility: HOSPITAL | Age: 69
End: 2024-10-16
Payer: MEDICARE

## 2024-10-16 ENCOUNTER — HOSPITAL ENCOUNTER (INPATIENT)
Facility: HOSPITAL | Age: 69
End: 2024-10-16
Attending: STUDENT IN AN ORGANIZED HEALTH CARE EDUCATION/TRAINING PROGRAM | Admitting: STUDENT IN AN ORGANIZED HEALTH CARE EDUCATION/TRAINING PROGRAM
Payer: MEDICARE

## 2024-10-16 ENCOUNTER — HOSPITAL ENCOUNTER (OUTPATIENT)
Facility: HOSPITAL | Age: 69
Setting detail: SURGERY ADMIT
End: 2024-10-16
Attending: STUDENT IN AN ORGANIZED HEALTH CARE EDUCATION/TRAINING PROGRAM | Admitting: STUDENT IN AN ORGANIZED HEALTH CARE EDUCATION/TRAINING PROGRAM
Payer: COMMERCIAL

## 2024-10-16 ENCOUNTER — APPOINTMENT (OUTPATIENT)
Dept: CARDIOLOGY | Facility: HOSPITAL | Age: 69
End: 2024-10-16
Payer: MEDICARE

## 2024-10-16 ENCOUNTER — ANESTHESIA EVENT (OUTPATIENT)
Dept: OPERATING ROOM | Facility: HOSPITAL | Age: 69
End: 2024-10-16
Payer: MEDICARE

## 2024-10-16 VITALS
RESPIRATION RATE: 32 BRPM | TEMPERATURE: 98.2 F | OXYGEN SATURATION: 97 % | SYSTOLIC BLOOD PRESSURE: 130 MMHG | BODY MASS INDEX: 23.2 KG/M2 | WEIGHT: 162.04 LBS | HEIGHT: 70 IN | DIASTOLIC BLOOD PRESSURE: 66 MMHG | HEART RATE: 87 BPM

## 2024-10-16 DIAGNOSIS — R57.8 HEMORRHAGIC SHOCK (MULTI): ICD-10-CM

## 2024-10-16 DIAGNOSIS — S36.029A SPLEEN HEMATOMA, INITIAL ENCOUNTER: Primary | ICD-10-CM

## 2024-10-16 DIAGNOSIS — S36.09XA: ICD-10-CM

## 2024-10-16 DIAGNOSIS — S36.09XA SPLENIC RUPTURE: Primary | ICD-10-CM

## 2024-10-16 DIAGNOSIS — G89.18 POSTOPERATIVE PAIN: ICD-10-CM

## 2024-10-16 DIAGNOSIS — K66.1 HEMOPERITONEUM: ICD-10-CM

## 2024-10-16 LAB
ABO GROUP (TYPE) IN BLOOD: NORMAL
ALBUMIN SERPL BCP-MCNC: 3.4 G/DL (ref 3.4–5)
ALBUMIN SERPL BCP-MCNC: 3.8 G/DL (ref 3.4–5)
ALP SERPL-CCNC: 51 U/L (ref 33–136)
ALT SERPL W P-5'-P-CCNC: 7 U/L (ref 10–52)
ANION GAP BLDA CALCULATED.4IONS-SCNC: 7 MMO/L (ref 10–25)
ANION GAP BLDA CALCULATED.4IONS-SCNC: 9 MMO/L (ref 10–25)
ANION GAP BLDA CALCULATED.4IONS-SCNC: 9 MMO/L (ref 10–25)
ANION GAP SERPL CALC-SCNC: 11 MMOL/L (ref 10–20)
ANION GAP SERPL CALCULATED.3IONS-SCNC: 15 MMOL/L (ref 10–20)
ANTIBODY SCREEN: NORMAL
ANTIBODY SCREEN: NORMAL
APTT PPP: 31 SECONDS (ref 27–38)
AST SERPL W P-5'-P-CCNC: 11 U/L (ref 9–39)
BASE EXCESS BLDA CALC-SCNC: -2.4 MMOL/L (ref -2–3)
BASE EXCESS BLDA CALC-SCNC: -2.5 MMOL/L (ref -2–3)
BASE EXCESS BLDA CALC-SCNC: -8.4 MMOL/L (ref -2–3)
BASOPHILS # BLD AUTO: 0.05 X10*3/UL (ref 0–0.1)
BASOPHILS # BLD AUTO: 0.07 X10*3/UL (ref 0–0.1)
BASOPHILS NFR BLD AUTO: 0.2 %
BASOPHILS NFR BLD AUTO: 0.4 %
BILIRUB SERPL-MCNC: 0.5 MG/DL (ref 0–1.2)
BLOOD EXPIRATION DATE: NORMAL
BODY TEMPERATURE: 37 DEGREES CELSIUS
BUN SERPL-MCNC: 25 MG/DL (ref 6–23)
BUN SERPL-MCNC: 29 MG/DL (ref 6–23)
CA-I BLD-SCNC: 1.19 MMOL/L (ref 1.1–1.33)
CA-I BLDA-SCNC: 0.98 MMOL/L (ref 1.1–1.33)
CA-I BLDA-SCNC: 1.17 MMOL/L (ref 1.1–1.33)
CA-I BLDA-SCNC: 1.21 MMOL/L (ref 1.1–1.33)
CALCIUM SERPL-MCNC: 8.4 MG/DL (ref 8.6–10.3)
CALCIUM SERPL-MCNC: 8.6 MG/DL (ref 8.6–10.6)
CARDIAC TROPONIN I PNL SERPL HS: 12 NG/L (ref 0–20)
CARDIAC TROPONIN I PNL SERPL HS: 13 NG/L (ref 0–20)
CFT FORM KAOLIN IND BLD RES TEG: 1.8 MIN (ref 0.8–2.1)
CHLORIDE BLDA-SCNC: 109 MMOL/L (ref 98–107)
CHLORIDE SERPL-SCNC: 103 MMOL/L (ref 98–107)
CHLORIDE SERPL-SCNC: 108 MMOL/L (ref 98–107)
CLOT ANGLE.KAOLIN INDUCED BLD RES TEG: 70 DEG (ref 63–78)
CLOT INIT KAO IND P HEP NEUT BLD RES TEG: 6.1 MIN (ref 4.3–8.3)
CLOT INIT KAO IND P HEP NEUT BLD RES TEG: 6.7 MIN (ref 4.6–9.1)
CO2 SERPL-SCNC: 21 MMOL/L (ref 21–32)
CO2 SERPL-SCNC: 26 MMOL/L (ref 21–32)
CREAT SERPL-MCNC: 1.75 MG/DL (ref 0.5–1.3)
CREAT SERPL-MCNC: 2.09 MG/DL (ref 0.5–1.3)
DISPENSE STATUS: NORMAL
EGFRCR SERPLBLD CKD-EPI 2021: 34 ML/MIN/1.73M*2
EGFRCR SERPLBLD CKD-EPI 2021: 42 ML/MIN/1.73M*2
EOSINOPHIL # BLD AUTO: 0.01 X10*3/UL (ref 0–0.7)
EOSINOPHIL # BLD AUTO: 0.44 X10*3/UL (ref 0–0.7)
EOSINOPHIL NFR BLD AUTO: 0 %
EOSINOPHIL NFR BLD AUTO: 3.3 %
ERYTHROCYTE [DISTWIDTH] IN BLOOD BY AUTOMATED COUNT: 11.9 % (ref 11.5–14.5)
ERYTHROCYTE [DISTWIDTH] IN BLOOD BY AUTOMATED COUNT: 11.9 % (ref 11.5–14.5)
ERYTHROCYTE [DISTWIDTH] IN BLOOD BY AUTOMATED COUNT: 16.4 % (ref 11.5–14.5)
FIBRINOGEN BLD CALC-MCNC: 286 MG/DL (ref 278–581)
FLUAV RNA RESP QL NAA+PROBE: NOT DETECTED
FLUBV RNA RESP QL NAA+PROBE: NOT DETECTED
GLUCOSE BLD MANUAL STRIP-MCNC: 151 MG/DL (ref 74–99)
GLUCOSE BLDA-MCNC: 155 MG/DL (ref 74–99)
GLUCOSE BLDA-MCNC: 162 MG/DL (ref 74–99)
GLUCOSE BLDA-MCNC: 164 MG/DL (ref 74–99)
GLUCOSE SERPL-MCNC: 105 MG/DL (ref 74–99)
GLUCOSE SERPL-MCNC: 150 MG/DL (ref 74–99)
HCO3 BLDA-SCNC: 19.2 MMOL/L (ref 22–26)
HCO3 BLDA-SCNC: 23.7 MMOL/L (ref 22–26)
HCO3 BLDA-SCNC: 23.7 MMOL/L (ref 22–26)
HCT VFR BLD AUTO: 28.3 % (ref 41–52)
HCT VFR BLD AUTO: 35.4 % (ref 41–52)
HCT VFR BLD AUTO: 35.4 % (ref 41–52)
HCT VFR BLD AUTO: 36.7 % (ref 41–52)
HCT VFR BLD EST: 35 % (ref 41–52)
HCT VFR BLD EST: 37 % (ref 41–52)
HCT VFR BLD EST: 38 % (ref 41–52)
HGB BLD-MCNC: 12.1 G/DL (ref 13.5–17.5)
HGB BLD-MCNC: 12.1 G/DL (ref 13.5–17.5)
HGB BLD-MCNC: 12.5 G/DL (ref 13.5–17.5)
HGB BLD-MCNC: 9.6 G/DL (ref 13.5–17.5)
HGB BLDA-MCNC: 11.6 G/DL (ref 13.5–17.5)
HGB BLDA-MCNC: 12.3 G/DL (ref 13.5–17.5)
HGB BLDA-MCNC: 12.8 G/DL (ref 13.5–17.5)
IMM GRANULOCYTES # BLD AUTO: 0.08 X10*3/UL (ref 0–0.7)
IMM GRANULOCYTES # BLD AUTO: 0.32 X10*3/UL (ref 0–0.7)
IMM GRANULOCYTES NFR BLD AUTO: 0.6 % (ref 0–0.9)
IMM GRANULOCYTES NFR BLD AUTO: 1 % (ref 0–0.9)
INHALED O2 CONCENTRATION: 100 %
INHALED O2 CONCENTRATION: 50 %
INR PPP: 1.1 (ref 0.9–1.1)
LACTATE BLDA-SCNC: 0.6 MMOL/L (ref 0.4–2)
LACTATE BLDA-SCNC: 0.7 MMOL/L (ref 0.4–2)
LACTATE BLDA-SCNC: 0.7 MMOL/L (ref 0.4–2)
LACTATE SERPL-SCNC: 1 MMOL/L (ref 0.4–2)
LYMPHOCYTES # BLD AUTO: 0.48 X10*3/UL (ref 1.2–4.8)
LYMPHOCYTES # BLD AUTO: 1.51 X10*3/UL (ref 1.2–4.8)
LYMPHOCYTES NFR BLD AUTO: 1.5 %
LYMPHOCYTES NFR BLD AUTO: 11.2 %
MA KAOLIN BLD RES TEG: 53 MM (ref 52–69)
MA KAOLIN+TF BLD RES TEG: 52 MM (ref 52–70)
MA TF IND+IIB-IIIA INH BLD RES TEG: 16 MM (ref 15–32)
MAGNESIUM SERPL-MCNC: 1.44 MG/DL (ref 1.6–2.4)
MCH RBC QN AUTO: 30.8 PG (ref 26–34)
MCH RBC QN AUTO: 35.1 PG (ref 26–34)
MCH RBC QN AUTO: 35.1 PG (ref 26–34)
MCHC RBC AUTO-ENTMCNC: 34.1 G/DL (ref 32–36)
MCHC RBC AUTO-ENTMCNC: 34.2 G/DL (ref 32–36)
MCHC RBC AUTO-ENTMCNC: 34.2 G/DL (ref 32–36)
MCV RBC AUTO: 103 FL (ref 80–100)
MCV RBC AUTO: 103 FL (ref 80–100)
MCV RBC AUTO: 90 FL (ref 80–100)
MONOCYTES # BLD AUTO: 1.2 X10*3/UL (ref 0.1–1)
MONOCYTES # BLD AUTO: 2.4 X10*3/UL (ref 0.1–1)
MONOCYTES NFR BLD AUTO: 7.3 %
MONOCYTES NFR BLD AUTO: 8.9 %
NEUTROPHILS # BLD AUTO: 10.17 X10*3/UL (ref 1.2–7.7)
NEUTROPHILS # BLD AUTO: 29.56 X10*3/UL (ref 1.2–7.7)
NEUTROPHILS NFR BLD AUTO: 75.6 %
NEUTROPHILS NFR BLD AUTO: 90 %
NRBC BLD-RTO: 0 /100 WBCS (ref 0–0)
OXYHGB MFR BLDA: 94.2 % (ref 94–98)
OXYHGB MFR BLDA: 95.3 % (ref 94–98)
OXYHGB MFR BLDA: 95.6 % (ref 94–98)
PCO2 BLDA: 45 MM HG (ref 38–42)
PCO2 BLDA: 46 MM HG (ref 38–42)
PCO2 BLDA: 47 MM HG (ref 38–42)
PH BLDA: 7.22 PH (ref 7.38–7.42)
PH BLDA: 7.32 PH (ref 7.38–7.42)
PH BLDA: 7.33 PH (ref 7.38–7.42)
PHOSPHATE SERPL-MCNC: 5.7 MG/DL (ref 2.5–4.9)
PLATELET # BLD AUTO: 101 X10*3/UL (ref 150–450)
PLATELET # BLD AUTO: 186 X10*3/UL (ref 150–450)
PLATELET # BLD AUTO: 186 X10*3/UL (ref 150–450)
PO2 BLDA: 205 MM HG (ref 85–95)
PO2 BLDA: 276 MM HG (ref 85–95)
PO2 BLDA: 76 MM HG (ref 85–95)
POTASSIUM BLDA-SCNC: 3.8 MMOL/L (ref 3.5–5.3)
POTASSIUM BLDA-SCNC: 3.9 MMOL/L (ref 3.5–5.3)
POTASSIUM BLDA-SCNC: 4.4 MMOL/L (ref 3.5–5.3)
POTASSIUM SERPL-SCNC: 3.2 MMOL/L (ref 3.5–5.3)
POTASSIUM SERPL-SCNC: 3.8 MMOL/L (ref 3.5–5.3)
PRODUCT BLOOD TYPE: 5100
PRODUCT BLOOD TYPE: 600
PRODUCT BLOOD TYPE: 600
PRODUCT BLOOD TYPE: 6200
PRODUCT BLOOD TYPE: 8400
PRODUCT CODE: NORMAL
PROT SERPL-MCNC: 6.9 G/DL (ref 6.4–8.2)
PROTHROMBIN TIME: 12.3 SECONDS (ref 9.8–12.8)
RBC # BLD AUTO: 3.45 X10*6/UL (ref 4.5–5.9)
RBC # BLD AUTO: 3.45 X10*6/UL (ref 4.5–5.9)
RBC # BLD AUTO: 4.06 X10*6/UL (ref 4.5–5.9)
RH FACTOR (ANTIGEN D): NORMAL
SAO2 % BLDA: 100 % (ref 94–100)
SAO2 % BLDA: 98 % (ref 94–100)
SAO2 % BLDA: 99 % (ref 94–100)
SARS-COV-2 RNA RESP QL NAA+PROBE: NOT DETECTED
SODIUM BLDA-SCNC: 133 MMOL/L (ref 136–145)
SODIUM BLDA-SCNC: 136 MMOL/L (ref 136–145)
SODIUM BLDA-SCNC: 138 MMOL/L (ref 136–145)
SODIUM SERPL-SCNC: 136 MMOL/L (ref 136–145)
SODIUM SERPL-SCNC: 141 MMOL/L (ref 136–145)
UNIT ABO: NORMAL
UNIT NUMBER: NORMAL
UNIT RH: NORMAL
UNIT VOLUME: 203
UNIT VOLUME: 209
UNIT VOLUME: 211
UNIT VOLUME: 220
UNIT VOLUME: 224
UNIT VOLUME: 283
UNIT VOLUME: 288
UNIT VOLUME: 289
UNIT VOLUME: 324
UNIT VOLUME: 328
UNIT VOLUME: 350
UNIT VOLUME: 87
WBC # BLD AUTO: 13.4 X10*3/UL (ref 4.4–11.3)
WBC # BLD AUTO: 13.4 X10*3/UL (ref 4.4–11.3)
WBC # BLD AUTO: 32.8 X10*3/UL (ref 4.4–11.3)
XM INTEP: NORMAL
XM INTEP: NORMAL

## 2024-10-16 PROCEDURE — 2500000004 HC RX 250 GENERAL PHARMACY W/ HCPCS (ALT 636 FOR OP/ED)

## 2024-10-16 PROCEDURE — 83605 ASSAY OF LACTIC ACID: CPT | Performed by: EMERGENCY MEDICINE

## 2024-10-16 PROCEDURE — 2500000005 HC RX 250 GENERAL PHARMACY W/O HCPCS: Performed by: STUDENT IN AN ORGANIZED HEALTH CARE EDUCATION/TRAINING PROGRAM

## 2024-10-16 PROCEDURE — P9037 PLATE PHERES LEUKOREDU IRRAD: HCPCS

## 2024-10-16 PROCEDURE — 99291 CRITICAL CARE FIRST HOUR: CPT | Performed by: STUDENT IN AN ORGANIZED HEALTH CARE EDUCATION/TRAINING PROGRAM

## 2024-10-16 PROCEDURE — 83735 ASSAY OF MAGNESIUM: CPT | Performed by: STUDENT IN AN ORGANIZED HEALTH CARE EDUCATION/TRAINING PROGRAM

## 2024-10-16 PROCEDURE — 88305 TISSUE EXAM BY PATHOLOGIST: CPT | Performed by: PATHOLOGY

## 2024-10-16 PROCEDURE — 38100 REMOVAL OF SPLEEN TOTAL: CPT | Performed by: STUDENT IN AN ORGANIZED HEALTH CARE EDUCATION/TRAINING PROGRAM

## 2024-10-16 PROCEDURE — 86900 BLOOD TYPING SEROLOGIC ABO: CPT | Performed by: STUDENT IN AN ORGANIZED HEALTH CARE EDUCATION/TRAINING PROGRAM

## 2024-10-16 PROCEDURE — 84484 ASSAY OF TROPONIN QUANT: CPT | Performed by: EMERGENCY MEDICINE

## 2024-10-16 PROCEDURE — 3600000003 HC OR TIME - INITIAL BASE CHARGE - PROCEDURE LEVEL THREE: Performed by: STUDENT IN AN ORGANIZED HEALTH CARE EDUCATION/TRAINING PROGRAM

## 2024-10-16 PROCEDURE — 71045 X-RAY EXAM CHEST 1 VIEW: CPT

## 2024-10-16 PROCEDURE — 2780000003 HC OR 278 NO HCPCS: Performed by: STUDENT IN AN ORGANIZED HEALTH CARE EDUCATION/TRAINING PROGRAM

## 2024-10-16 PROCEDURE — 85025 COMPLETE CBC W/AUTO DIFF WBC: CPT | Performed by: STUDENT IN AN ORGANIZED HEALTH CARE EDUCATION/TRAINING PROGRAM

## 2024-10-16 PROCEDURE — 88342 IMHCHEM/IMCYTCHM 1ST ANTB: CPT | Performed by: PATHOLOGY

## 2024-10-16 PROCEDURE — 99285 EMERGENCY DEPT VISIT HI MDM: CPT | Mod: 25

## 2024-10-16 PROCEDURE — 5A1935Z RESPIRATORY VENTILATION, LESS THAN 24 CONSECUTIVE HOURS: ICD-10-PCS

## 2024-10-16 PROCEDURE — 2500000004 HC RX 250 GENERAL PHARMACY W/ HCPCS (ALT 636 FOR OP/ED): Performed by: STUDENT IN AN ORGANIZED HEALTH CARE EDUCATION/TRAINING PROGRAM

## 2024-10-16 PROCEDURE — P9016 RBC LEUKOCYTES REDUCED: HCPCS

## 2024-10-16 PROCEDURE — 88313 SPECIAL STAINS GROUP 2: CPT | Performed by: PATHOLOGY

## 2024-10-16 PROCEDURE — 94002 VENT MGMT INPAT INIT DAY: CPT

## 2024-10-16 PROCEDURE — 74174 CTA ABD&PLVS W/CONTRAST: CPT

## 2024-10-16 PROCEDURE — 30233N1 TRANSFUSION OF NONAUTOLOGOUS RED BLOOD CELLS INTO PERIPHERAL VEIN, PERCUTANEOUS APPROACH: ICD-10-PCS | Performed by: SURGERY

## 2024-10-16 PROCEDURE — 31500 INSERT EMERGENCY AIRWAY: CPT | Performed by: STUDENT IN AN ORGANIZED HEALTH CARE EDUCATION/TRAINING PROGRAM

## 2024-10-16 PROCEDURE — 96361 HYDRATE IV INFUSION ADD-ON: CPT

## 2024-10-16 PROCEDURE — 88341 IMHCHEM/IMCYTCHM EA ADD ANTB: CPT | Performed by: PATHOLOGY

## 2024-10-16 PROCEDURE — 96365 THER/PROPH/DIAG IV INF INIT: CPT | Mod: 59

## 2024-10-16 PROCEDURE — 74176 CT ABD & PELVIS W/O CONTRAST: CPT | Performed by: RADIOLOGY

## 2024-10-16 PROCEDURE — 82435 ASSAY OF BLOOD CHLORIDE: CPT | Performed by: STUDENT IN AN ORGANIZED HEALTH CARE EDUCATION/TRAINING PROGRAM

## 2024-10-16 PROCEDURE — 99291 CRITICAL CARE FIRST HOUR: CPT | Mod: 25 | Performed by: STUDENT IN AN ORGANIZED HEALTH CARE EDUCATION/TRAINING PROGRAM

## 2024-10-16 PROCEDURE — 87636 SARSCOV2 & INF A&B AMP PRB: CPT | Performed by: EMERGENCY MEDICINE

## 2024-10-16 PROCEDURE — 36430 TRANSFUSION BLD/BLD COMPNT: CPT

## 2024-10-16 PROCEDURE — 2550000001 HC RX 255 CONTRASTS: Performed by: EMERGENCY MEDICINE

## 2024-10-16 PROCEDURE — 84295 ASSAY OF SERUM SODIUM: CPT | Performed by: STUDENT IN AN ORGANIZED HEALTH CARE EDUCATION/TRAINING PROGRAM

## 2024-10-16 PROCEDURE — 71045 X-RAY EXAM CHEST 1 VIEW: CPT | Performed by: RADIOLOGY

## 2024-10-16 PROCEDURE — 85027 COMPLETE CBC AUTOMATED: CPT | Performed by: EMERGENCY MEDICINE

## 2024-10-16 PROCEDURE — 82330 ASSAY OF CALCIUM: CPT

## 2024-10-16 PROCEDURE — 82805 BLOOD GASES W/O2 SATURATION: CPT

## 2024-10-16 PROCEDURE — 84132 ASSAY OF SERUM POTASSIUM: CPT

## 2024-10-16 PROCEDURE — 99291 CRITICAL CARE FIRST HOUR: CPT | Mod: 25

## 2024-10-16 PROCEDURE — 96375 TX/PRO/DX INJ NEW DRUG ADDON: CPT | Mod: 59

## 2024-10-16 PROCEDURE — 2500000004 HC RX 250 GENERAL PHARMACY W/ HCPCS (ALT 636 FOR OP/ED): Mod: JZ,JG

## 2024-10-16 PROCEDURE — 82947 ASSAY GLUCOSE BLOOD QUANT: CPT

## 2024-10-16 PROCEDURE — P9017 PLASMA 1 DONOR FRZ W/IN 8 HR: HCPCS

## 2024-10-16 PROCEDURE — 93005 ELECTROCARDIOGRAM TRACING: CPT

## 2024-10-16 PROCEDURE — 0BH17EZ INSERTION OF ENDOTRACHEAL AIRWAY INTO TRACHEA, VIA NATURAL OR ARTIFICIAL OPENING: ICD-10-PCS

## 2024-10-16 PROCEDURE — 2020000001 HC ICU ROOM DAILY

## 2024-10-16 PROCEDURE — 88305 TISSUE EXAM BY PATHOLOGIST: CPT | Mod: TC,SUR | Performed by: STUDENT IN AN ORGANIZED HEALTH CARE EDUCATION/TRAINING PROGRAM

## 2024-10-16 PROCEDURE — 86901 BLOOD TYPING SEROLOGIC RH(D): CPT | Performed by: STUDENT IN AN ORGANIZED HEALTH CARE EDUCATION/TRAINING PROGRAM

## 2024-10-16 PROCEDURE — 71045 X-RAY EXAM CHEST 1 VIEW: CPT | Performed by: SURGERY

## 2024-10-16 PROCEDURE — 74176 CT ABD & PELVIS W/O CONTRAST: CPT | Mod: 59

## 2024-10-16 PROCEDURE — 2720000007 HC OR 272 NO HCPCS: Performed by: STUDENT IN AN ORGANIZED HEALTH CARE EDUCATION/TRAINING PROGRAM

## 2024-10-16 PROCEDURE — 3700000002 HC GENERAL ANESTHESIA TIME - EACH INCREMENTAL 1 MINUTE: Performed by: STUDENT IN AN ORGANIZED HEALTH CARE EDUCATION/TRAINING PROGRAM

## 2024-10-16 PROCEDURE — 82435 ASSAY OF BLOOD CHLORIDE: CPT

## 2024-10-16 PROCEDURE — 07TP0ZZ RESECTION OF SPLEEN, OPEN APPROACH: ICD-10-PCS | Performed by: STUDENT IN AN ORGANIZED HEALTH CARE EDUCATION/TRAINING PROGRAM

## 2024-10-16 PROCEDURE — 86850 RBC ANTIBODY SCREEN: CPT | Performed by: EMERGENCY MEDICINE

## 2024-10-16 PROCEDURE — 3600000008 HC OR TIME - EACH INCREMENTAL 1 MINUTE - PROCEDURE LEVEL THREE: Performed by: STUDENT IN AN ORGANIZED HEALTH CARE EDUCATION/TRAINING PROGRAM

## 2024-10-16 PROCEDURE — 2500000004 HC RX 250 GENERAL PHARMACY W/ HCPCS (ALT 636 FOR OP/ED): Performed by: EMERGENCY MEDICINE

## 2024-10-16 PROCEDURE — 74018 RADEX ABDOMEN 1 VIEW: CPT

## 2024-10-16 PROCEDURE — 82330 ASSAY OF CALCIUM: CPT | Performed by: STUDENT IN AN ORGANIZED HEALTH CARE EDUCATION/TRAINING PROGRAM

## 2024-10-16 PROCEDURE — 36415 COLL VENOUS BLD VENIPUNCTURE: CPT | Performed by: EMERGENCY MEDICINE

## 2024-10-16 PROCEDURE — 80053 COMPREHEN METABOLIC PANEL: CPT | Performed by: EMERGENCY MEDICINE

## 2024-10-16 PROCEDURE — 31500 INSERT EMERGENCY AIRWAY: CPT

## 2024-10-16 PROCEDURE — 96374 THER/PROPH/DIAG INJ IV PUSH: CPT | Mod: 59

## 2024-10-16 PROCEDURE — 86920 COMPATIBILITY TEST SPIN: CPT

## 2024-10-16 PROCEDURE — 85014 HEMATOCRIT: CPT | Mod: 59 | Performed by: EMERGENCY MEDICINE

## 2024-10-16 PROCEDURE — 84132 ASSAY OF SERUM POTASSIUM: CPT | Performed by: STUDENT IN AN ORGANIZED HEALTH CARE EDUCATION/TRAINING PROGRAM

## 2024-10-16 PROCEDURE — 3700000001 HC GENERAL ANESTHESIA TIME - INITIAL BASE CHARGE: Performed by: STUDENT IN AN ORGANIZED HEALTH CARE EDUCATION/TRAINING PROGRAM

## 2024-10-16 PROCEDURE — 37799 UNLISTED PX VASCULAR SURGERY: CPT | Performed by: STUDENT IN AN ORGANIZED HEALTH CARE EDUCATION/TRAINING PROGRAM

## 2024-10-16 PROCEDURE — 85384 FIBRINOGEN ACTIVITY: CPT | Performed by: STUDENT IN AN ORGANIZED HEALTH CARE EDUCATION/TRAINING PROGRAM

## 2024-10-16 PROCEDURE — 87075 CULTR BACTERIA EXCEPT BLOOD: CPT | Mod: TRILAB,59 | Performed by: EMERGENCY MEDICINE

## 2024-10-16 PROCEDURE — 85610 PROTHROMBIN TIME: CPT | Performed by: STUDENT IN AN ORGANIZED HEALTH CARE EDUCATION/TRAINING PROGRAM

## 2024-10-16 PROCEDURE — 85730 THROMBOPLASTIN TIME PARTIAL: CPT | Performed by: STUDENT IN AN ORGANIZED HEALTH CARE EDUCATION/TRAINING PROGRAM

## 2024-10-16 PROCEDURE — 86850 RBC ANTIBODY SCREEN: CPT | Performed by: STUDENT IN AN ORGANIZED HEALTH CARE EDUCATION/TRAINING PROGRAM

## 2024-10-16 PROCEDURE — 74018 RADEX ABDOMEN 1 VIEW: CPT | Performed by: RADIOLOGY

## 2024-10-16 PROCEDURE — 88365 INSITU HYBRIDIZATION (FISH): CPT | Performed by: PATHOLOGY

## 2024-10-16 RX ORDER — PANTOPRAZOLE SODIUM 40 MG/1
40 TABLET, DELAYED RELEASE ORAL
Status: DISCONTINUED | OUTPATIENT
Start: 2024-10-17 | End: 2024-10-19

## 2024-10-16 RX ORDER — ROCURONIUM BROMIDE 10 MG/ML
INJECTION, SOLUTION INTRAVENOUS AS NEEDED
Status: DISCONTINUED | OUTPATIENT
Start: 2024-10-16 | End: 2024-10-16

## 2024-10-16 RX ORDER — HYDROMORPHONE HYDROCHLORIDE 1 MG/ML
INJECTION, SOLUTION INTRAMUSCULAR; INTRAVENOUS; SUBCUTANEOUS AS NEEDED
Status: DISCONTINUED | OUTPATIENT
Start: 2024-10-16 | End: 2024-10-16

## 2024-10-16 RX ORDER — METRONIDAZOLE 500 MG/100ML
500 INJECTION, SOLUTION INTRAVENOUS ONCE
Status: COMPLETED | OUTPATIENT
Start: 2024-10-16 | End: 2024-10-16

## 2024-10-16 RX ORDER — SODIUM CHLORIDE, SODIUM LACTATE, POTASSIUM CHLORIDE, CALCIUM CHLORIDE 600; 310; 30; 20 MG/100ML; MG/100ML; MG/100ML; MG/100ML
125 INJECTION, SOLUTION INTRAVENOUS CONTINUOUS
Status: ACTIVE | OUTPATIENT
Start: 2024-10-16 | End: 2024-10-17

## 2024-10-16 RX ORDER — NOREPINEPHRINE BITARTRATE/D5W 8 MG/250ML
PLASTIC BAG, INJECTION (ML) INTRAVENOUS
Status: DISPENSED
Start: 2024-10-16 | End: 2024-10-17

## 2024-10-16 RX ORDER — FENTANYL CITRATE 50 UG/ML
INJECTION, SOLUTION INTRAMUSCULAR; INTRAVENOUS AS NEEDED
Status: DISCONTINUED | OUTPATIENT
Start: 2024-10-16 | End: 2024-10-16

## 2024-10-16 RX ORDER — MAGNESIUM SULFATE HEPTAHYDRATE 40 MG/ML
4 INJECTION, SOLUTION INTRAVENOUS ONCE
Status: COMPLETED | OUTPATIENT
Start: 2024-10-17 | End: 2024-10-17

## 2024-10-16 RX ORDER — FENTANYL CITRATE-0.9 % NACL/PF 10 MCG/ML
0-300 PLASTIC BAG, INJECTION (ML) INTRAVENOUS CONTINUOUS
Status: DISCONTINUED | OUTPATIENT
Start: 2024-10-16 | End: 2024-10-17

## 2024-10-16 RX ORDER — ONDANSETRON HYDROCHLORIDE 2 MG/ML
4 INJECTION, SOLUTION INTRAVENOUS ONCE
Status: COMPLETED | OUTPATIENT
Start: 2024-10-16 | End: 2024-10-16

## 2024-10-16 RX ORDER — CALCIUM CHLORIDE INJECTION 100 MG/ML
INJECTION, SOLUTION INTRAVENOUS AS NEEDED
Status: DISCONTINUED | OUTPATIENT
Start: 2024-10-16 | End: 2024-10-16

## 2024-10-16 RX ORDER — CALCIUM GLUCONATE 20 MG/ML
INJECTION, SOLUTION INTRAVENOUS
Status: COMPLETED
Start: 2024-10-16 | End: 2024-10-16

## 2024-10-16 RX ORDER — FENTANYL CITRATE 50 UG/ML
100 INJECTION, SOLUTION INTRAMUSCULAR; INTRAVENOUS ONCE
Status: COMPLETED | OUTPATIENT
Start: 2024-10-16 | End: 2024-10-16

## 2024-10-16 RX ORDER — INSULIN LISPRO 100 [IU]/ML
0-10 INJECTION, SOLUTION INTRAVENOUS; SUBCUTANEOUS EVERY 4 HOURS
Status: DISPENSED | OUTPATIENT
Start: 2024-10-16

## 2024-10-16 RX ORDER — SUCCINYLCHOLINE CHLORIDE 20 MG/ML
INJECTION INTRAMUSCULAR; INTRAVENOUS
Status: COMPLETED
Start: 2024-10-16 | End: 2024-10-16

## 2024-10-16 RX ORDER — INDOMETHACIN 25 MG/1
CAPSULE ORAL AS NEEDED
Status: DISCONTINUED | OUTPATIENT
Start: 2024-10-16 | End: 2024-10-16

## 2024-10-16 RX ORDER — DEXTROSE 50 % IN WATER (D50W) INTRAVENOUS SYRINGE
12.5
Status: ACTIVE | OUTPATIENT
Start: 2024-10-16

## 2024-10-16 RX ORDER — ACETAMINOPHEN 10 MG/ML
1000 INJECTION, SOLUTION INTRAVENOUS EVERY 6 HOURS SCHEDULED
Status: COMPLETED | OUTPATIENT
Start: 2024-10-17 | End: 2024-10-17

## 2024-10-16 RX ORDER — MORPHINE SULFATE 4 MG/ML
4 INJECTION, SOLUTION INTRAMUSCULAR; INTRAVENOUS ONCE
Status: COMPLETED | OUTPATIENT
Start: 2024-10-16 | End: 2024-10-16

## 2024-10-16 RX ORDER — LIDOCAINE HYDROCHLORIDE 20 MG/ML
INJECTION, SOLUTION INFILTRATION; PERINEURAL AS NEEDED
Status: DISCONTINUED | OUTPATIENT
Start: 2024-10-16 | End: 2024-10-16

## 2024-10-16 RX ORDER — HYDROMORPHONE HYDROCHLORIDE 1 MG/ML
1 INJECTION, SOLUTION INTRAMUSCULAR; INTRAVENOUS; SUBCUTANEOUS ONCE
Status: COMPLETED | OUTPATIENT
Start: 2024-10-16 | End: 2024-10-16

## 2024-10-16 RX ORDER — KETAMINE HYDROCHLORIDE 50 MG/ML
0.3 INJECTION, SOLUTION INTRAMUSCULAR; INTRAVENOUS ONCE
Status: COMPLETED | OUTPATIENT
Start: 2024-10-16 | End: 2024-10-16

## 2024-10-16 RX ORDER — PHENYLEPHRINE HCL IN 0.9% NACL 0.4MG/10ML
SYRINGE (ML) INTRAVENOUS AS NEEDED
Status: DISCONTINUED | OUTPATIENT
Start: 2024-10-16 | End: 2024-10-16

## 2024-10-16 RX ORDER — PROPOFOL 10 MG/ML
5-50 INJECTION, EMULSION INTRAVENOUS CONTINUOUS
Status: DISCONTINUED | OUTPATIENT
Start: 2024-10-16 | End: 2024-10-17

## 2024-10-16 RX ORDER — ALBUTEROL SULFATE 90 UG/1
2 INHALANT RESPIRATORY (INHALATION)
Status: DISCONTINUED | OUTPATIENT
Start: 2024-10-17 | End: 2024-10-17

## 2024-10-16 RX ORDER — NOREPINEPHRINE BITARTRATE/D5W 8 MG/250ML
0-.2 PLASTIC BAG, INJECTION (ML) INTRAVENOUS CONTINUOUS
Status: DISCONTINUED | OUTPATIENT
Start: 2024-10-16 | End: 2024-10-17

## 2024-10-16 RX ORDER — CEFAZOLIN 1 G/1
INJECTION, POWDER, FOR SOLUTION INTRAVENOUS AS NEEDED
Status: DISCONTINUED | OUTPATIENT
Start: 2024-10-16 | End: 2024-10-16

## 2024-10-16 RX ORDER — TRANEXAMIC ACID 100 MG/ML
INJECTION, SOLUTION INTRAVENOUS AS NEEDED
Status: DISCONTINUED | OUTPATIENT
Start: 2024-10-16 | End: 2024-10-16

## 2024-10-16 RX ORDER — FENTANYL CITRATE-0.9 % NACL/PF 10 MCG/ML
PLASTIC BAG, INJECTION (ML) INTRAVENOUS
Status: COMPLETED
Start: 2024-10-16 | End: 2024-10-16

## 2024-10-16 RX ORDER — POLYETHYLENE GLYCOL 3350 17 G/17G
17 POWDER, FOR SOLUTION ORAL DAILY
Status: DISPENSED | OUTPATIENT
Start: 2024-10-17

## 2024-10-16 RX ORDER — SUCCINYLCHOLINE CHLORIDE 20 MG/ML
120 INJECTION INTRAMUSCULAR; INTRAVENOUS ONCE
Status: COMPLETED | OUTPATIENT
Start: 2024-10-16 | End: 2024-10-16

## 2024-10-16 RX ORDER — DEXTROSE 50 % IN WATER (D50W) INTRAVENOUS SYRINGE
25
Status: ACTIVE | OUTPATIENT
Start: 2024-10-16

## 2024-10-16 RX ORDER — CALCIUM GLUCONATE 20 MG/ML
2 INJECTION, SOLUTION INTRAVENOUS ONCE
Status: COMPLETED | OUTPATIENT
Start: 2024-10-16 | End: 2024-10-16

## 2024-10-16 RX ORDER — KETAMINE HYDROCHLORIDE 50 MG/ML
100 INJECTION, SOLUTION INTRAMUSCULAR; INTRAVENOUS ONCE
Status: COMPLETED | OUTPATIENT
Start: 2024-10-16 | End: 2024-10-16

## 2024-10-16 RX ORDER — POTASSIUM CHLORIDE 14.9 MG/ML
20 INJECTION INTRAVENOUS ONCE
Status: COMPLETED | OUTPATIENT
Start: 2024-10-17 | End: 2024-10-17

## 2024-10-16 RX ORDER — PANTOPRAZOLE SODIUM 40 MG/10ML
40 INJECTION, POWDER, LYOPHILIZED, FOR SOLUTION INTRAVENOUS
Status: DISCONTINUED | OUTPATIENT
Start: 2024-10-17 | End: 2024-10-19

## 2024-10-16 RX ORDER — LOSARTAN POTASSIUM 100 MG/1
100 TABLET ORAL DAILY
Status: ACTIVE | OUTPATIENT
Start: 2024-10-17

## 2024-10-16 RX ORDER — ATORVASTATIN CALCIUM 20 MG/1
20 TABLET, FILM COATED ORAL DAILY
Status: DISCONTINUED | OUTPATIENT
Start: 2024-10-17 | End: 2024-10-20

## 2024-10-16 RX ORDER — SODIUM CHLORIDE 0.9 G/100ML
IRRIGANT IRRIGATION AS NEEDED
Status: DISCONTINUED | OUTPATIENT
Start: 2024-10-16 | End: 2024-10-16 | Stop reason: HOSPADM

## 2024-10-16 RX ORDER — PROPOFOL 10 MG/ML
INJECTION, EMULSION INTRAVENOUS
Status: DISPENSED
Start: 2024-10-16 | End: 2024-10-17

## 2024-10-16 RX ORDER — CEFTRIAXONE 2 G/50ML
2 INJECTION, SOLUTION INTRAVENOUS ONCE
Status: COMPLETED | OUTPATIENT
Start: 2024-10-16 | End: 2024-10-16

## 2024-10-16 RX ORDER — CHLORTHALIDONE 25 MG/1
25 TABLET ORAL DAILY
Status: ACTIVE | OUTPATIENT
Start: 2024-10-17

## 2024-10-16 RX ADMIN — KETAMINE HYDROCHLORIDE 100 MG: 50 INJECTION, SOLUTION, CONCENTRATE INTRAMUSCULAR; INTRAVENOUS at 18:59

## 2024-10-16 RX ADMIN — SUCCINYLCHOLINE CHLORIDE 120 MG: 20 INJECTION INTRAMUSCULAR; INTRAVENOUS at 18:58

## 2024-10-16 RX ADMIN — Medication 50 PERCENT: at 22:54

## 2024-10-16 RX ADMIN — Medication 50 MCG/HR: at 22:52

## 2024-10-16 RX ADMIN — PROPOFOL 50 MCG/KG/MIN: 10 INJECTION, EMULSION INTRAVENOUS at 22:50

## 2024-10-16 RX ADMIN — CALCIUM GLUCONATE 2 G: 20 INJECTION, SOLUTION INTRAVENOUS at 18:48

## 2024-10-16 RX ADMIN — SODIUM CHLORIDE, POTASSIUM CHLORIDE, SODIUM LACTATE AND CALCIUM CHLORIDE 125 ML/HR: 600; 310; 30; 20 INJECTION, SOLUTION INTRAVENOUS at 22:51

## 2024-10-16 RX ADMIN — SUCCINYLCHOLINE CHLORIDE 120 MG: 20 INJECTION, SOLUTION INTRAMUSCULAR; INTRAVENOUS at 18:58

## 2024-10-16 SDOH — ECONOMIC STABILITY: HOUSING INSECURITY: AT ANY TIME IN THE PAST 12 MONTHS, WERE YOU HOMELESS OR LIVING IN A SHELTER (INCLUDING NOW)?: PATIENT UNABLE TO ANSWER

## 2024-10-16 SDOH — ECONOMIC STABILITY: FOOD INSECURITY
HOW HARD IS IT FOR YOU TO PAY FOR THE VERY BASICS LIKE FOOD, HOUSING, MEDICAL CARE, AND HEATING?: PATIENT UNABLE TO ANSWER

## 2024-10-16 SDOH — SOCIAL STABILITY: SOCIAL INSECURITY: WERE YOU ABLE TO COMPLETE ALL THE BEHAVIORAL HEALTH SCREENINGS?: NO

## 2024-10-16 SDOH — ECONOMIC STABILITY: HOUSING INSECURITY
IN THE LAST 12 MONTHS, WAS THERE A TIME WHEN YOU WERE NOT ABLE TO PAY THE MORTGAGE OR RENT ON TIME?: PATIENT UNABLE TO ANSWER

## 2024-10-16 SDOH — ECONOMIC STABILITY: FOOD INSECURITY
WITHIN THE PAST 12 MONTHS, YOU WORRIED THAT YOUR FOOD WOULD RUN OUT BEFORE YOU GOT THE MONEY TO BUY MORE.: PATIENT UNABLE TO ANSWER

## 2024-10-16 SDOH — SOCIAL STABILITY: SOCIAL INSECURITY: ARE YOU OR HAVE YOU BEEN THREATENED OR ABUSED PHYSICALLY, EMOTIONALLY, OR SEXUALLY BY ANYONE?: UNABLE TO ASSESS

## 2024-10-16 SDOH — SOCIAL STABILITY: SOCIAL INSECURITY: HAVE YOU HAD THOUGHTS OF HARMING ANYONE ELSE?: NO

## 2024-10-16 SDOH — SOCIAL STABILITY: SOCIAL INSECURITY: WITHIN THE LAST YEAR, HAVE YOU BEEN AFRAID OF YOUR PARTNER OR EX-PARTNER?: PATIENT UNABLE TO ANSWER

## 2024-10-16 SDOH — SOCIAL STABILITY: SOCIAL INSECURITY: HAS ANYONE EVER THREATENED TO HURT YOUR FAMILY OR YOUR PETS?: UNABLE TO ASSESS

## 2024-10-16 SDOH — SOCIAL STABILITY: SOCIAL INSECURITY
WITHIN THE LAST YEAR, HAVE YOU BEEN KICKED, HIT, SLAPPED, OR OTHERWISE PHYSICALLY HURT BY YOUR PARTNER OR EX-PARTNER?: PATIENT UNABLE TO ANSWER

## 2024-10-16 SDOH — SOCIAL STABILITY: SOCIAL INSECURITY: DOES ANYONE TRY TO KEEP YOU FROM HAVING/CONTACTING OTHER FRIENDS OR DOING THINGS OUTSIDE YOUR HOME?: UNABLE TO ASSESS

## 2024-10-16 SDOH — SOCIAL STABILITY: SOCIAL INSECURITY
WITHIN THE LAST YEAR, HAVE YOU BEEN RAPED OR FORCED TO HAVE ANY KIND OF SEXUAL ACTIVITY BY YOUR PARTNER OR EX-PARTNER?: PATIENT UNABLE TO ANSWER

## 2024-10-16 SDOH — ECONOMIC STABILITY: FOOD INSECURITY
WITHIN THE PAST 12 MONTHS, THE FOOD YOU BOUGHT JUST DIDN'T LAST AND YOU DIDN'T HAVE MONEY TO GET MORE.: PATIENT UNABLE TO ANSWER

## 2024-10-16 SDOH — ECONOMIC STABILITY: INCOME INSECURITY
IN THE PAST 12 MONTHS HAS THE ELECTRIC, GAS, OIL, OR WATER COMPANY THREATENED TO SHUT OFF SERVICES IN YOUR HOME?: PATIENT UNABLE TO ANSWER

## 2024-10-16 SDOH — SOCIAL STABILITY: SOCIAL INSECURITY: DO YOU FEEL UNSAFE GOING BACK TO THE PLACE WHERE YOU ARE LIVING?: UNABLE TO ASSESS

## 2024-10-16 SDOH — SOCIAL STABILITY: SOCIAL INSECURITY: HAVE YOU HAD ANY THOUGHTS OF HARMING ANYONE ELSE?: UNABLE TO ASSESS

## 2024-10-16 SDOH — ECONOMIC STABILITY: TRANSPORTATION INSECURITY
IN THE PAST 12 MONTHS, HAS LACK OF TRANSPORTATION KEPT YOU FROM MEDICAL APPOINTMENTS OR FROM GETTING MEDICATIONS?: PATIENT UNABLE TO ANSWER

## 2024-10-16 SDOH — SOCIAL STABILITY: SOCIAL INSECURITY: ABUSE: ADULT

## 2024-10-16 SDOH — SOCIAL STABILITY: SOCIAL INSECURITY: DO YOU FEEL ANYONE HAS EXPLOITED OR TAKEN ADVANTAGE OF YOU FINANCIALLY OR OF YOUR PERSONAL PROPERTY?: UNABLE TO ASSESS

## 2024-10-16 SDOH — SOCIAL STABILITY: SOCIAL INSECURITY: ARE THERE ANY APPARENT SIGNS OF INJURIES/BEHAVIORS THAT COULD BE RELATED TO ABUSE/NEGLECT?: UNABLE TO ASSESS

## 2024-10-16 SDOH — SOCIAL STABILITY: SOCIAL INSECURITY
WITHIN THE LAST YEAR, HAVE YOU BEEN HUMILIATED OR EMOTIONALLY ABUSED IN OTHER WAYS BY YOUR PARTNER OR EX-PARTNER?: PATIENT UNABLE TO ANSWER

## 2024-10-16 ASSESSMENT — PAIN SCALES - GENERAL
PAINLEVEL_OUTOF10: 0 - NO PAIN
PAINLEVEL_OUTOF10: 6
PAINLEVEL_OUTOF10: 0 - NO PAIN
PAIN_LEVEL: 0
PAINLEVEL_OUTOF10: 4

## 2024-10-16 ASSESSMENT — ACTIVITIES OF DAILY LIVING (ADL)
BATHING: UNABLE TO ASSESS
JUDGMENT_ADEQUATE_SAFELY_COMPLETE_DAILY_ACTIVITIES: UNABLE TO ASSESS
GROOMING: UNABLE TO ASSESS
HEARING - LEFT EAR: UNABLE TO ASSESS
LACK_OF_TRANSPORTATION: PATIENT UNABLE TO ANSWER
HEARING - RIGHT EAR: UNABLE TO ASSESS
DRESSING YOURSELF: UNABLE TO ASSESS
FEEDING YOURSELF: UNABLE TO ASSESS
PATIENT'S MEMORY ADEQUATE TO SAFELY COMPLETE DAILY ACTIVITIES?: UNABLE TO ASSESS
ADEQUATE_TO_COMPLETE_ADL: UNABLE TO ASSESS
WALKS IN HOME: UNABLE TO ASSESS
LACK_OF_TRANSPORTATION: PATIENT UNABLE TO ANSWER
TOILETING: UNABLE TO ASSESS

## 2024-10-16 ASSESSMENT — COGNITIVE AND FUNCTIONAL STATUS - GENERAL
TOILETING: TOTAL
DAILY ACTIVITIY SCORE: 6
TURNING FROM BACK TO SIDE WHILE IN FLAT BAD: TOTAL
DRESSING REGULAR LOWER BODY CLOTHING: TOTAL
MOBILITY SCORE: 6
STANDING UP FROM CHAIR USING ARMS: TOTAL
PATIENT BASELINE BEDBOUND: NO
HELP NEEDED FOR BATHING: TOTAL
MOVING FROM LYING ON BACK TO SITTING ON SIDE OF FLAT BED WITH BEDRAILS: TOTAL
WALKING IN HOSPITAL ROOM: TOTAL
EATING MEALS: TOTAL
PERSONAL GROOMING: TOTAL
MOVING TO AND FROM BED TO CHAIR: TOTAL
DRESSING REGULAR UPPER BODY CLOTHING: TOTAL
CLIMB 3 TO 5 STEPS WITH RAILING: TOTAL

## 2024-10-16 ASSESSMENT — PAIN - FUNCTIONAL ASSESSMENT
PAIN_FUNCTIONAL_ASSESSMENT: CPOT (CRITICAL CARE PAIN OBSERVATION TOOL)
PAIN_FUNCTIONAL_ASSESSMENT: 0-10

## 2024-10-16 ASSESSMENT — LIFESTYLE VARIABLES
HOW OFTEN DO YOU HAVE 6 OR MORE DRINKS ON ONE OCCASION: PATIENT UNABLE TO ANSWER
HOW OFTEN DO YOU HAVE A DRINK CONTAINING ALCOHOL: PATIENT UNABLE TO ANSWER
SKIP TO QUESTIONS 9-10: 0
HOW MANY STANDARD DRINKS CONTAINING ALCOHOL DO YOU HAVE ON A TYPICAL DAY: PATIENT UNABLE TO ANSWER
AUDIT-C TOTAL SCORE: -1
AUDIT-C TOTAL SCORE: -1

## 2024-10-16 ASSESSMENT — PATIENT HEALTH QUESTIONNAIRE - PHQ9
SUM OF ALL RESPONSES TO PHQ9 QUESTIONS 1 & 2: 0
1. LITTLE INTEREST OR PLEASURE IN DOING THINGS: NOT AT ALL
2. FEELING DOWN, DEPRESSED OR HOPELESS: NOT AT ALL

## 2024-10-16 ASSESSMENT — COLUMBIA-SUICIDE SEVERITY RATING SCALE - C-SSRS
1. IN THE PAST MONTH, HAVE YOU WISHED YOU WERE DEAD OR WISHED YOU COULD GO TO SLEEP AND NOT WAKE UP?: NO
6. HAVE YOU EVER DONE ANYTHING, STARTED TO DO ANYTHING, OR PREPARED TO DO ANYTHING TO END YOUR LIFE?: NO
2. HAVE YOU ACTUALLY HAD ANY THOUGHTS OF KILLING YOURSELF?: NO

## 2024-10-16 NOTE — ED TRIAGE NOTES
Transfer from OSH, reports flu like symptoms over last week. Scanned at outside hospital noticed spleenic hematoma . Hypotension on arrival.

## 2024-10-16 NOTE — ED PROVIDER NOTES
EMERGENCY DEPARTMENT ENCOUNTER      Pt Name: Facundo Youngblood  MRN: [id number]  Birthdate 1955  Date of evaluation: 10/16/2024  ED Provider: Nallely Jaquez DO     CHIEF COMPLAINT       Chief Complaint   Patient presents with    Weakness, Gen     Patient bib ems for weakness and dizziness. Patient co flu like symptoms x 5 days. Patient vss and nad       HISTORY OF PRESENT ILLNESS    Facundo Youngblood is a 69 y.o. who presents to the emergency department with complaints of generalized weakness and dizziness.  For the past 4 days he has been having flulike symptoms including nausea, vomiting, diarrhea, hot and cold sweats.  Grandson saw the patient over the weekend and became sick with similar symptoms as well.  He did not have a way to take his temperature.  He did state he thought he was feeling better earlier today however approximate 1 hour prior to arrival he developed a sharp pain in his left side of his abdomen.  He states that this made him keel over and he subsequently lowered himself to the ground.  There is no trauma.  He was found by his daughter after calling her significantly diaphoretic and pale complaining of significant abdominal pain.    REVIEW OF SYSTEMS     Focused ROS performed and negative other than as listed in HPI    PAST MEDICAL HISTORY   No past medical history on file.    SURGICAL HISTORY       Past Surgical History:   Procedure Laterality Date    CT ABDOMEN PELVIS ANGIOGRAM W AND/OR WO IV CONTRAST  12/27/2022    CT ABDOMEN PELVIS ANGIOGRAM W AND/OR WO IV CONTRAST [facility]    CT ABDOMEN PELVIS ANGIOGRAM W AND/OR WO IV CONTRAST  3/10/2023    CT ABDOMEN PELVIS ANGIOGRAM W AND/OR WO IV CONTRAST [facility]       CURRENT MEDICATIONS       Discharge Medication List as of 10/16/2024  6:10 PM        CONTINUE these medications which have NOT CHANGED    Details   albuterol (Proventil HFA) 90 mcg/actuation inhaler Inhale 2 puffs 4 times a day., Starting Fri 2/25/2022,  Historical Med      amLODIPine (Norvasc) 10 mg tablet TAKE 1 TABLET(10 MG) BY MOUTH DAILY, Starting Mon 6/3/2024, Normal      atorvastatin (Lipitor) 20 mg tablet TAKE 1 TABLET BY MOUTH DAILY, Starting Mon 6/3/2024, Normal      chlorthalidone (Hygroton) 25 mg tablet TAKE 1 TABLET BY MOUTH DAILY, Starting Mon 6/10/2024, Normal      Farxiga 10 mg Take 1 tablet (10 mg) by mouth once daily., Starting Mon 10/14/2024, Until Tue 10/14/2025, Normal      losartan (Cozaar) 100 mg tablet TAKE 1 TABLET BY MOUTH DAILY, Starting Mon 6/3/2024, Normal             ALLERGIES     Penicillins and Ibuprofen    FAMILY HISTORY       Family History   Problem Relation Name Age of Onset    Cancer Mother      Diabetes Mother          SOCIAL HISTORY       Social History     Socioeconomic History    Marital status: Single   Tobacco Use    Smoking status: Every Day     Types: Cigars    Smokeless tobacco: Never    Tobacco comments:     cigars   Vaping Use    Vaping status: Never Used   Substance and Sexual Activity    Alcohol use: Yes    Drug use: Never       PHYSICAL EXAM       ED Triage Vitals [10/16/24 1444]   Temperature Heart Rate Respirations BP   36.9 °C (98.4 °F) 72 16 132/66      Pulse Ox Temp Source Heart Rate Source Patient Position   99 % Temporal -- --      BP Location FiO2 (%)     -- --        General: Appears uncomfortable but in no acute distress, alert  Head: Head atraumatic; normocephalic  Eyes: normal inspection; no icterus  ENT: mucosa moist without lesion  Neck: Normal inspection, no meningeal signs  Resp: Normal breath sounds, no wheeze or crackles; No respiratory distress  Chest Wall: no tenderness or deformity  Heart: Heart rate and rhythm regular; No Murmurs  Abdomen: Soft, mild diffuse abdominal pain; No distention, guarding, rigidity, or rebound  MSK: Normal appearance; Moves all extremities; No Pedal edema  Neuro: Alert; no focal deficits, moves all extremities  Psych: Mood and Affect normal  Skin: Color appropriate;  warm; Dry    DIAGNOSTIC RESULTS   Lab and radiology results are independently interpreted unless noted below.  RADIOLOGY (Per Emergency Physician):     Interpretation per the Radiologist below, if available at the time of this note:  CT angio abdomen pelvis w and or wo IV IV contrast   Final Result   1.   There is a large perisplenic subcapsular hematoma again noted. There   is deformity noted of the underlying spleen. There are multiple   perisplenic irregular regions of hyperdensity overlying the splenic   capsule noted concerning for ongoing active extravasation.   2. There has been interval development of a small amount of   perihepatic free fluid noted. Findings are new when compared to the   prior study.             MACRO:   William Jaeger discussed the significance and urgency of this critical   finding by epic chat with  EVIE PATTERSON on 10/16/2024 at 5:32 pm.   (**-RCF-**) Findings:  See findings.        Signed by: William Jaeger 10/16/2024 5:33 PM   Dictation workstation:   [id number]      CT abdomen pelvis wo IV contrast   Final Result   1. Large splenic subcapsular hematoma with heterogeneous density   demonstrated with hematoma measuring 11.3 x 3.9 x 12.6 cm.   Characterization of the splenic laceration limited without IV   contrast. Of note, minimal fluid tracks in the left pericolic gutter   with moderate fluid with blood products in the lower pelvis. Surgical   consultation recommended.        2. Small sliding hiatal hernia.        3. Abdominal aortic endograft in place with aneurysm sac stable from   prior imaging.        4. Uncomplicated sigmoid diverticulosis                            MACRO:   Melania Grewal discussed the significance and urgency of this   critical finding by secure chat with  EVIE PATTERSON on 10/16/2024 at   4:49 pm.  (**-RCF-**) Findings:  See findings.             Signed by: Melania Grewal 10/16/2024 4:50 PM   Dictation workstation:   [id number]          LABS:  Abnormal Labs  Reviewed   CBC - Abnormal; Notable for the following components:       Result Value    WBC 13.4 (*)     RBC 3.45 (*)     Hemoglobin 12.1 (*)     Hematocrit 35.4 (*)      (*)     MCH 35.1 (*)     All other components within normal limits   COMPREHENSIVE METABOLIC PANEL - Abnormal; Notable for the following components:    Glucose 105 (*)     Potassium 3.2 (*)     Urea Nitrogen 29 (*)     Creatinine 2.09 (*)     eGFR 34 (*)     Calcium 8.4 (*)     ALT 7 (*)     All other components within normal limits   CBC WITH AUTO DIFFERENTIAL - Abnormal; Notable for the following components:    WBC 13.4 (*)     RBC 3.45 (*)     Hemoglobin 12.1 (*)     Hematocrit 35.4 (*)      (*)     MCH 35.1 (*)     Neutrophils Absolute 10.17 (*)     Monocytes Absolute 1.20 (*)     All other components within normal limits   HEMOGLOBIN AND HEMATOCRIT, BLOOD - Abnormal; Notable for the following components:    Hemoglobin 9.6 (*)     Hematocrit 28.3 (*)     All other components within normal limits       All other labs were within normal range or not returned as of this dictation.    EKG:  Personally interpreted by Nallely Jaquez, DO  1442: Normal sinus rhythm with a ventricular rate 64 T wave inversion lead III developing right bundle branch block no acute ST elevation    EMERGENCY DEPARTMENT COURSE/MDM   Patient presents with flulike symptoms for the past several days that acutely worsened earlier today.  On exam he does appear dry but in no acute distress.  He is provided pain medicine and workup including noncontrast CT scan given his underlying renal dysfunction is ordered.  Patient is agreeable with this plan of care.    ED Course as of 10/16/24 1813   Wed Oct 16, 2024   1641 Notified by nursing that the patient was having worsening abdominal pain and a drop in their blood pressure.  I went to reassess the patient.  He states the pain is worse and worse with breathing specifically in the left abdomen.  Patient placed in a resting  calibered and given a second bolus of saline.  CT scan without contrast was personally reviewed and not seeing any major acute findings however awaiting formal radiology read to determine if patient requires a CT scan with contrast despite baseline renal function. [EF]   1642 Given wbc, bp, and respirations, will empirically cover with antibiotics for an intraabdominal source [EF]   1651 Discussed case with radiology who noted a large subcapsular splenic hematoma with moderate amount of blood in the pelvis. Pt updated on findings and agreeable with plan of care.  [EF]   1717 Hg dropped 4 points in 2 hrs. Case discussed with acute care surgery [facility] who reviewed the CT scan. Advised transfusing 2 units now and accepted the patient in transfer. [EF]   1720 Went to reassess patient and blood pressure has continued to decrease and the patient is more pale.  Is able take 10 minutes to get the crossmatched blood emergency blood is ordered and immediately provided.  He was also given 0.3 mg/kg of ketamine for pain with improvement in his symptoms.  Flight arrived at the bedside and bedside report is given.  At the time of handoff abdominal exam is slightly more guarded but not significantly distended, he states his pain is improved.  Blood pressure has improved as well as his complexion.  He is transferred in guarded condition. [EF]      ED Course User Index  [EF] Nallely Jaquez,          Diagnoses as of 10/16/24 1813   Splenic rupture   Hemorrhagic shock (Multi)   Hemoperitoneum       Meds Administered:  Medications   sodium chloride 0.9 % bolus 1,000 mL (0 mL intravenous Stopped 10/16/24 1652)   morphine injection 4 mg (4 mg intravenous Given 10/16/24 1546)   HYDROmorphone (Dilaudid) injection 1 mg (1 mg intravenous Given 10/16/24 1626)   sodium chloride 0.9 % bolus 1,000 mL (0 mL intravenous Stopped 10/16/24 1735)   metroNIDAZOLE (Flagyl) 500 mg in sodium chloride (iso)  mL (0 mg intravenous Stopped 10/16/24  1755)   cefTRIAXone (Rocephin) 2 g in dextrose (iso) IV 50 mL (0 g intravenous Stopped 10/16/24 1703)   fentaNYL PF (Sublimaze) injection 100 mcg (100 mcg intravenous Given 10/16/24 1656)   iohexol (OMNIPaque) 350 mg iodine/mL solution 75 mL (75 mL intravenous Given 10/16/24 1707)   ketamine injection 22 mg (22 mg intravenous Given 10/16/24 1734)   ondansetron (Zofran) injection 4 mg (4 mg intravenous Given 10/16/24 1739)       PROCEDURES   Unless otherwise noted below, none  Procedures      FINAL IMPRESSION      1. Splenic rupture    2. Hemorrhagic shock (Multi)    3. Hemoperitoneum          DISPOSITION    Transfer To Another Facility 10/16/2024 05:29:05 PM    Critical Care Time   TOTAL CRITICAL CARE TIME (EXCLUDING SEPARATE BILLABLE PROCEDURES): 47 min  CC time assessed for complex medical decision making, coordination of care between providers and specialists, discussion with family (if patient unable to contribute), documentation of findings, and interpretation of labwork and imaging.      (Comment: Please note this report has been produced using speech recognition software and may contain errors related to that system including errors in grammar, punctuation, and spelling, as well as words and phrases that may be inappropriate.  If there are any questions or concerns please feel free to contact the dictating provider for clarification.)    Nallely Jaquez DO (electronically signed)  Emergency Medicine Physician    History Limited by: None  Independent history obtained from: Family Member daughter, son  External records reviewed: None  Diagnostics interpreted by me: EKG - see my independent interpretation elsewhere in the chart  Discussions with other clinicians:  radiologist, Acute care surgery [facility], attending ED physician [facility]  Chronic conditions impacting care: None  Social determinants of health affecting care: None  Diagnostic tests considered but not performed: n/a  ED Medications managed:  Medications    sodium chloride 0.9 % bolus 1,000 mL (0 mL intravenous Stopped 10/16/24 1652)   morphine injection 4 mg (4 mg intravenous Given 10/16/24 1546)   HYDROmorphone (Dilaudid) injection 1 mg (1 mg intravenous Given 10/16/24 1626)   sodium chloride 0.9 % bolus 1,000 mL (0 mL intravenous Stopped 10/16/24 1735)   metroNIDAZOLE (Flagyl) 500 mg in sodium chloride (iso)  mL (0 mg intravenous Stopped 10/16/24 1755)   cefTRIAXone (Rocephin) 2 g in dextrose (iso) IV 50 mL (0 g intravenous Stopped 10/16/24 1703)   fentaNYL PF (Sublimaze) injection 100 mcg (100 mcg intravenous Given 10/16/24 1656)   iohexol (OMNIPaque) 350 mg iodine/mL solution 75 mL (75 mL intravenous Given 10/16/24 1707)   ketamine injection 22 mg (22 mg intravenous Given 10/16/24 1734)   ondansetron (Zofran) injection 4 mg (4 mg intravenous Given 10/16/24 1739)       Prescription drugs considered: None             Nallely Jaquez DO  10/16/24 [id number]

## 2024-10-16 NOTE — SIGNIFICANT EVENT
Patient transferred from [facility] for spontaneous splenic rupture    On arrival, the patient is peritonitic, SBP 81, responded with 3u pRBC, 1ffp    Plan for emergent Exploratory laparotomy, control of bleeding, splenectomy

## 2024-10-16 NOTE — ED NOTES
Blood Administration: (two units PRBC given at OSH)  MTP activated at 1844 10/16/24    PRBC's given in ED at: 1833, 1837, 1847, 1851, 1901, 1909, 1919    FFP's given in ED at: 1839, 1943, 1904, 1914, additional unit hung in route and started in OR    Total blood given: 9 units PRBC's, 5 FFP     Ifrah Richard RN  10/16/24 1935

## 2024-10-16 NOTE — H&P
[facility]  ACUTE CARE SURGERY - HISTORY AND PHYSICAL / CONSULT    Patient Name: Facundo Youngblood  MRN: [id number]  Admit Date: 1016  : 1955  AGE: 69 y.o.   GENDER: male  ==============================================================================  TODAY'S ASSESSMENT AND PLAN OF CARE:  Activate MTP  Intubation in ED for respiratory distress  Obtain T+S  Book and consent for OR for open splenectomy    Discussed with attending Dr. Olutola Mackenzie A Simerlink, MD   General Surgery  Acute Care Surgery pager [phone]  ==============================================================================  CHIEF COMPLAINT/REASON FOR CONSULT:  Transfer for splenic rupture    PAST MEDICAL HISTORY:   PMH:   No past medical history on file.    PSH: ureteral stents  Past Surgical History:   Procedure Laterality Date    CT ABDOMEN PELVIS ANGIOGRAM W AND/OR WO IV CONTRAST  2022    CT ABDOMEN PELVIS ANGIOGRAM W AND/OR WO IV CONTRAST [facility]    CT ABDOMEN PELVIS ANGIOGRAM W AND/OR WO IV CONTRAST  3/10/2023    CT ABDOMEN PELVIS ANGIOGRAM W AND/OR WO IV CONTRAST [facility]     FH:   Family History   Problem Relation Name Age of Onset    Cancer Mother      Diabetes Mother       SOCIAL HISTORY:    Smoking:    Social History     Tobacco Use   Smoking Status Every Day    Types: Cigars   Smokeless Tobacco Never   Tobacco Comments    cigars       Alcohol:    Social History     Substance and Sexual Activity   Alcohol Use Yes         MEDICATIONS:   Prior to Admission medications    Medication Sig Start Date End Date Taking? Authorizing Provider   albuterol (Proventil HFA) 90 mcg/actuation inhaler Inhale 2 puffs 4 times a day. 22   Historical Provider, MD   amLODIPine (Norvasc) 10 mg tablet TAKE 1 TABLET(10 MG) BY MOUTH DAILY 6/3/24   Isabel Chiu MD   atorvastatin (Lipitor) 20 mg tablet TAKE 1 TABLET BY MOUTH DAILY 6/3/24   Isabel Chiu MD   chlorthalidone  Vital Signs (Hygroton) 25 mg tablet TAKE 1 TABLET BY MOUTH DAILY 6/10/24   Isabel Chiu MD   Farxiga 10 mg Take 1 tablet (10 mg) by mouth once daily. 10/14/24 10/14/25  Gely Wilcox MD   losartan (Cozaar) 100 mg tablet TAKE 1 TABLET BY MOUTH DAILY 6/3/24   Isabel Chiu MD   Farxiga 10 mg Take 1 tablet (10 mg) by mouth once daily. 5/8/24 10/14/24  Gely Wilcox MD     ALLERGIES:   Allergies   Allergen Reactions    Penicillins Other and Unknown    Ibuprofen GI Upset       PHYSICAL EXAM:  Physical Exam  Constitutional:       General: He is in acute distress.   HENT:      Mouth/Throat:      Mouth: Mucous membranes are moist.   Eyes:      General: No scleral icterus.  Cardiovascular:      Rate and Rhythm: Regular rhythm. Tachycardia present.   Pulmonary:      Effort: Respiratory distress present.   Abdominal:      General: There is distension.      Tenderness: There is abdominal tenderness. There is guarding.   Neurological:      General: No focal deficit present.      Mental Status: He is alert.       IMAGING SUMMARY: CTA AP with large perisplenic subcapsular hematoma with active extravasation    LABS:  Results for orders placed or performed during the hospital encounter of 10/16/24 (from the past 24 hours)   Sars-CoV-2 PCR   Result Value Ref Range    Coronavirus 2019, PCR Not Detected Not Detected   Influenza A, and B PCR   Result Value Ref Range    Flu A Result Not Detected Not Detected    Flu B Result Not Detected Not Detected   CBC   Result Value Ref Range    WBC 13.4 (H) 4.4 - 11.3 x10*3/uL    nRBC 0.0 0.0 - 0.0 /100 WBCs    RBC 3.45 (L) 4.50 - 5.90 x10*6/uL    Hemoglobin 12.1 (L) 13.5 - 17.5 g/dL    Hematocrit 35.4 (L) 41.0 - 52.0 %     (H) 80 - 100 fL    MCH 35.1 (H) 26.0 - 34.0 pg    MCHC 34.2 32.0 - 36.0 g/dL    RDW 11.9 11.5 - 14.5 %    Platelets 186 150 - 450 x10*3/uL   Comprehensive metabolic panel   Result Value Ref Range    Glucose 105 (H) 74 - 99 mg/dL    Sodium 136 136 - 145 mmol/L     Potassium 3.2 (L) 3.5 - 5.3 mmol/L    Chloride 103 98 - 107 mmol/L    Bicarbonate 21 21 - 32 mmol/L    Anion Gap 15 10 - 20 mmol/L    Urea Nitrogen 29 (H) 6 - 23 mg/dL    Creatinine 2.09 (H) 0.50 - 1.30 mg/dL    eGFR 34 (L) >60 mL/min/1.73m*2    Calcium 8.4 (L) 8.6 - 10.3 mg/dL    Albumin 3.8 3.4 - 5.0 g/dL    Alkaline Phosphatase 51 33 - 136 U/L    Total Protein 6.9 6.4 - 8.2 g/dL    AST 11 9 - 39 U/L    Bilirubin, Total 0.5 0.0 - 1.2 mg/dL    ALT 7 (L) 10 - 52 U/L   Troponin I, High Sensitivity, Initial   Result Value Ref Range    Troponin I, High Sensitivity 12 0 - 20 ng/L   CBC and Auto Differential   Result Value Ref Range    WBC 13.4 (H) 4.4 - 11.3 x10*3/uL    nRBC 0.0 0.0 - 0.0 /100 WBCs    RBC 3.45 (L) 4.50 - 5.90 x10*6/uL    Hemoglobin 12.1 (L) 13.5 - 17.5 g/dL    Hematocrit 35.4 (L) 41.0 - 52.0 %     (H) 80 - 100 fL    MCH 35.1 (H) 26.0 - 34.0 pg    MCHC 34.2 32.0 - 36.0 g/dL    RDW 11.9 11.5 - 14.5 %    Platelets 186 150 - 450 x10*3/uL    Neutrophils % 75.6 40.0 - 80.0 %    Immature Granulocytes %, Automated 0.6 0.0 - 0.9 %    Lymphocytes % 11.2 13.0 - 44.0 %    Monocytes % 8.9 2.0 - 10.0 %    Eosinophils % 3.3 0.0 - 6.0 %    Basophils % 0.4 0.0 - 2.0 %    Neutrophils Absolute 10.17 (H) 1.20 - 7.70 x10*3/uL    Immature Granulocytes Absolute, Automated 0.08 0.00 - 0.70 x10*3/uL    Lymphocytes Absolute 1.51 1.20 - 4.80 x10*3/uL    Monocytes Absolute 1.20 (H) 0.10 - 1.00 x10*3/uL    Eosinophils Absolute 0.44 0.00 - 0.70 x10*3/uL    Basophils Absolute 0.05 0.00 - 0.10 x10*3/uL   Troponin, High Sensitivity, 1 Hour   Result Value Ref Range    Troponin I, High Sensitivity 13 0 - 20 ng/L   Lactate   Result Value Ref Range    Lactate 1.0 0.4 - 2.0 mmol/L   Hemoglobin and hematocrit, blood   Result Value Ref Range    Hemoglobin 9.6 (L) 13.5 - 17.5 g/dL    Hematocrit 28.3 (L) 41.0 - 52.0 %   Type and Screen   Result Value Ref Range    ABO TYPE A     Rh TYPE NEG     ANTIBODY SCREEN NEG    VERIFY ABO/Rh Group  Test   Result Value Ref Range    ABO TYPE A     Rh TYPE NEG    Prepare RBC: 2 Units   Result Value Ref Range    PRODUCT CODE E9183G31     Unit Number [id number]     Unit ABO A     Unit RH NEG     XM INTEP COMP     Dispense Status XM     Blood Expiration Date 10/31/2024 11:59:00 PM EDT     PRODUCT BLOOD TYPE 0600     UNIT VOLUME 288     PRODUCT CODE G5382I02     Unit Number [id number]     Unit ABO A     Unit RH NEG     XM INTEP COMP     Dispense Status XM     Blood Expiration Date 11/11/2024 11:59:00 PM EST     PRODUCT BLOOD TYPE 0600     UNIT VOLUME 350    Prepare RBC   Result Value Ref Range    PRODUCT CODE U2173Q32     Unit Number [id number]     Unit ABO O     Unit RH NEG     Dispense Status PI     Blood Expiration Date 11/11/2024 11:59:00 PM EST     PRODUCT BLOOD TYPE 9500     UNIT VOLUME 350     PRODUCT CODE O7458R97     Unit Number [id number]     Unit ABO O     Unit RH NEG     Dispense Status PI     Blood Expiration Date 11/15/2024 11:59:00 PM EST     PRODUCT BLOOD TYPE      UNIT VOLUME 276     XM INTEP COMP     XM INTEP COMP        I have reviewed all laboratory and imaging results ordered/pertinent for this encounter.

## 2024-10-16 NOTE — ED PROCEDURE NOTE
Procedure  Intubation    Performed by: Yoni Prado MD  Authorized by: Lul Barragan MD    Consent:     Consent obtained:  Verbal    Consent given by:  Patient  Universal protocol:     Patient identity confirmed:  Verbally with patient, arm band and provided demographic data  Pre-procedure details:     Indications: altered consciousness and respiratory failure      Patient status:  Awake    Look externally: facial hair      Mouth opening - incisor distance:  3 or more finger widths    Hyoid-mental distance: 3 or more finger widths      Hyoid-thyroid distance: 2 or more finger widths      Mallampati score:  II    Obstruction: none      Neck mobility: normal      Pharmacologic strategy: RSI      Induction agents:  Ketamine    Paralytics:  Succinylcholine  Procedure details:     Preoxygenation:  Bag valve mask    CPR in progress: no      Number of attempts:  1  Successful intubation attempt details:     Intubation method:  Oral    Intubation technique: video assisted      Laryngoscope blade:  Mac 3    Bougie used: yes      Grade view: I      Tube size (mm):  7.5    Tube type:  Cuffed    Tube visualized through cords: yes    Placement assessment:     ETT at teeth/gumline (cm):  23    Tube secured with:  ETT regalado    Breath sounds:  Equal    Placement verification: chest rise, CXR verification, direct visualization, equal breath sounds, tube exhalation and waveform ETCO2      CXR findings:  Low  Post-procedure details:     Procedure completion:  Tolerated               Yoni Prado MD  Resident  10/16/24 [id number]

## 2024-10-16 NOTE — ANESTHESIA PROCEDURE NOTES
Arterial Line:    Date/Time: 10/16/2024 7:40 PM    Staffing  Performed: CAA   Authorized by: Say Riggs MD    Performed by: Ronda Valdivia MD    An arterial line was placed. Procedure performed using ultrasound guidance.in the OR for the following indication(s): continuous blood pressure monitoring and blood sampling needed.    A 20 gauge (size), 1 and 3/4 inch (length), Angiocath (type) catheter was placed into the Right radial artery, secured by Tegaderm,   Seldinger technique used.  Events:  patient tolerated procedure well with no complications.

## 2024-10-17 ENCOUNTER — APPOINTMENT (OUTPATIENT)
Dept: RADIOLOGY | Facility: HOSPITAL | Age: 69
End: 2024-10-17
Payer: MEDICARE

## 2024-10-17 LAB
ALBUMIN SERPL BCP-MCNC: 3.1 G/DL (ref 3.4–5)
ALBUMIN SERPL BCP-MCNC: 3.1 G/DL (ref 3.4–5)
ANION GAP BLDA CALCULATED.4IONS-SCNC: 8 MMO/L (ref 10–25)
ANION GAP BLDA CALCULATED.4IONS-SCNC: 9 MMO/L (ref 10–25)
ANION GAP SERPL CALC-SCNC: 12 MMOL/L (ref 10–20)
ANION GAP SERPL CALC-SCNC: 13 MMOL/L (ref 10–20)
APTT PPP: 28 SECONDS (ref 27–38)
ATRIAL RATE: 64 BPM
BASE EXCESS BLDA CALC-SCNC: -2 MMOL/L (ref -2–3)
BASE EXCESS BLDA CALC-SCNC: 0 MMOL/L (ref -2–3)
BASOPHILS # BLD AUTO: 0.02 X10*3/UL (ref 0–0.1)
BASOPHILS # BLD AUTO: 0.03 X10*3/UL (ref 0–0.1)
BASOPHILS NFR BLD AUTO: 0.1 %
BASOPHILS NFR BLD AUTO: 0.1 %
BLOOD EXPIRATION DATE: NORMAL
BODY TEMPERATURE: 37 DEGREES CELSIUS
BODY TEMPERATURE: 37 DEGREES CELSIUS
BUN SERPL-MCNC: 28 MG/DL (ref 6–23)
BUN SERPL-MCNC: 28 MG/DL (ref 6–23)
CA-I BLD-SCNC: 1.16 MMOL/L (ref 1.1–1.33)
CA-I BLD-SCNC: 1.16 MMOL/L (ref 1.1–1.33)
CA-I BLDA-SCNC: 1.21 MMOL/L (ref 1.1–1.33)
CA-I BLDA-SCNC: 1.22 MMOL/L (ref 1.1–1.33)
CALCIUM SERPL-MCNC: 8 MG/DL (ref 8.6–10.6)
CALCIUM SERPL-MCNC: 8.2 MG/DL (ref 8.6–10.6)
CHLORIDE BLDA-SCNC: 109 MMOL/L (ref 98–107)
CHLORIDE BLDA-SCNC: 110 MMOL/L (ref 98–107)
CHLORIDE SERPL-SCNC: 108 MMOL/L (ref 98–107)
CHLORIDE SERPL-SCNC: 108 MMOL/L (ref 98–107)
CO2 SERPL-SCNC: 24 MMOL/L (ref 21–32)
CO2 SERPL-SCNC: 25 MMOL/L (ref 21–32)
CREAT SERPL-MCNC: 2.03 MG/DL (ref 0.5–1.3)
CREAT SERPL-MCNC: 2.04 MG/DL (ref 0.5–1.3)
DISPENSE STATUS: NORMAL
EGFRCR SERPLBLD CKD-EPI 2021: 35 ML/MIN/1.73M*2
EGFRCR SERPLBLD CKD-EPI 2021: 35 ML/MIN/1.73M*2
EOSINOPHIL # BLD AUTO: 0 X10*3/UL (ref 0–0.7)
EOSINOPHIL # BLD AUTO: 0.02 X10*3/UL (ref 0–0.7)
EOSINOPHIL NFR BLD AUTO: 0 %
EOSINOPHIL NFR BLD AUTO: 0.1 %
ERYTHROCYTE [DISTWIDTH] IN BLOOD BY AUTOMATED COUNT: 17.1 % (ref 11.5–14.5)
ERYTHROCYTE [DISTWIDTH] IN BLOOD BY AUTOMATED COUNT: 17.2 % (ref 11.5–14.5)
GLUCOSE BLD MANUAL STRIP-MCNC: 120 MG/DL (ref 74–99)
GLUCOSE BLD MANUAL STRIP-MCNC: 125 MG/DL (ref 74–99)
GLUCOSE BLD MANUAL STRIP-MCNC: 127 MG/DL (ref 74–99)
GLUCOSE BLD MANUAL STRIP-MCNC: 127 MG/DL (ref 74–99)
GLUCOSE BLD MANUAL STRIP-MCNC: 133 MG/DL (ref 74–99)
GLUCOSE BLD MANUAL STRIP-MCNC: 136 MG/DL (ref 74–99)
GLUCOSE BLD MANUAL STRIP-MCNC: 163 MG/DL (ref 74–99)
GLUCOSE BLDA-MCNC: 120 MG/DL (ref 74–99)
GLUCOSE BLDA-MCNC: 171 MG/DL (ref 74–99)
GLUCOSE SERPL-MCNC: 115 MG/DL (ref 74–99)
GLUCOSE SERPL-MCNC: 116 MG/DL (ref 74–99)
HCO3 BLDA-SCNC: 23.1 MMOL/L (ref 22–26)
HCO3 BLDA-SCNC: 24.8 MMOL/L (ref 22–26)
HCT VFR BLD AUTO: 31.7 % (ref 41–52)
HCT VFR BLD AUTO: 31.9 % (ref 41–52)
HCT VFR BLD EST: 35 % (ref 41–52)
HCT VFR BLD EST: 35 % (ref 41–52)
HGB BLD-MCNC: 10.9 G/DL (ref 13.5–17.5)
HGB BLD-MCNC: 10.9 G/DL (ref 13.5–17.5)
HGB BLDA-MCNC: 11.7 G/DL (ref 13.5–17.5)
HGB BLDA-MCNC: 11.7 G/DL (ref 13.5–17.5)
IMM GRANULOCYTES # BLD AUTO: 0.12 X10*3/UL (ref 0–0.7)
IMM GRANULOCYTES # BLD AUTO: 0.14 X10*3/UL (ref 0–0.7)
IMM GRANULOCYTES NFR BLD AUTO: 0.6 % (ref 0–0.9)
IMM GRANULOCYTES NFR BLD AUTO: 0.8 % (ref 0–0.9)
INHALED O2 CONCENTRATION: 60 %
INHALED O2 CONCENTRATION: 60 %
INR PPP: 1.1 (ref 0.9–1.1)
LACTATE BLDA-SCNC: 1 MMOL/L (ref 0.4–2)
LACTATE BLDA-SCNC: 1.1 MMOL/L (ref 0.4–2)
LDH SERPL L TO P-CCNC: 214 U/L (ref 84–246)
LYMPHOCYTES # BLD AUTO: 0.73 X10*3/UL (ref 1.2–4.8)
LYMPHOCYTES # BLD AUTO: 1.11 X10*3/UL (ref 1.2–4.8)
LYMPHOCYTES NFR BLD AUTO: 4.3 %
LYMPHOCYTES NFR BLD AUTO: 5.2 %
MAGNESIUM SERPL-MCNC: 2.17 MG/DL (ref 1.6–2.4)
MAGNESIUM SERPL-MCNC: 2.61 MG/DL (ref 1.6–2.4)
MCH RBC QN AUTO: 30.6 PG (ref 26–34)
MCH RBC QN AUTO: 30.9 PG (ref 26–34)
MCHC RBC AUTO-ENTMCNC: 34.2 G/DL (ref 32–36)
MCHC RBC AUTO-ENTMCNC: 34.4 G/DL (ref 32–36)
MCV RBC AUTO: 90 FL (ref 80–100)
MCV RBC AUTO: 90 FL (ref 80–100)
MONOCYTES # BLD AUTO: 2.37 X10*3/UL (ref 0.1–1)
MONOCYTES # BLD AUTO: 2.79 X10*3/UL (ref 0.1–1)
MONOCYTES NFR BLD AUTO: 11 %
MONOCYTES NFR BLD AUTO: 16.5 %
NEUTROPHILS # BLD AUTO: 13.2 X10*3/UL (ref 1.2–7.7)
NEUTROPHILS # BLD AUTO: 17.86 X10*3/UL (ref 1.2–7.7)
NEUTROPHILS NFR BLD AUTO: 78.2 %
NEUTROPHILS NFR BLD AUTO: 83.1 %
NRBC BLD-RTO: 0 /100 WBCS (ref 0–0)
NRBC BLD-RTO: 0 /100 WBCS (ref 0–0)
OXYHGB MFR BLDA: 96.8 % (ref 94–98)
OXYHGB MFR BLDA: 96.9 % (ref 94–98)
P AXIS: 21 DEGREES
P OFFSET: 178 MS
P ONSET: 153 MS
PCO2 BLDA: 40 MM HG (ref 38–42)
PCO2 BLDA: 40 MM HG (ref 38–42)
PH BLDA: 7.37 PH (ref 7.38–7.42)
PH BLDA: 7.4 PH (ref 7.38–7.42)
PHOSPHATE SERPL-MCNC: 3.9 MG/DL (ref 2.5–4.9)
PHOSPHATE SERPL-MCNC: 4.8 MG/DL (ref 2.5–4.9)
PLATELET # BLD AUTO: 86 X10*3/UL (ref 150–450)
PLATELET # BLD AUTO: 94 X10*3/UL (ref 150–450)
PO2 BLDA: 108 MM HG (ref 85–95)
PO2 BLDA: 138 MM HG (ref 85–95)
POTASSIUM BLDA-SCNC: 4.2 MMOL/L (ref 3.5–5.3)
POTASSIUM BLDA-SCNC: 4.3 MMOL/L (ref 3.5–5.3)
POTASSIUM SERPL-SCNC: 3.7 MMOL/L (ref 3.5–5.3)
POTASSIUM SERPL-SCNC: 4.1 MMOL/L (ref 3.5–5.3)
PR INTERVAL: 134 MS
PRODUCT BLOOD TYPE: 2800
PRODUCT BLOOD TYPE: 5100
PRODUCT BLOOD TYPE: 6200
PRODUCT BLOOD TYPE: 9500
PRODUCT BLOOD TYPE: NORMAL
PRODUCT CODE: NORMAL
PROTHROMBIN TIME: 12.6 SECONDS (ref 9.8–12.8)
Q ONSET: 220 MS
QRS COUNT: 11 BEATS
QRS DURATION: 116 MS
QT INTERVAL: 408 MS
QTC CALCULATION(BAZETT): 420 MS
QTC FREDERICIA: 416 MS
R AXIS: 268 DEGREES
RBC # BLD AUTO: 3.53 X10*6/UL (ref 4.5–5.9)
RBC # BLD AUTO: 3.56 X10*6/UL (ref 4.5–5.9)
SAO2 % BLDA: 100 % (ref 94–100)
SAO2 % BLDA: 99 % (ref 94–100)
SODIUM BLDA-SCNC: 137 MMOL/L (ref 136–145)
SODIUM BLDA-SCNC: 138 MMOL/L (ref 136–145)
SODIUM SERPL-SCNC: 141 MMOL/L (ref 136–145)
SODIUM SERPL-SCNC: 141 MMOL/L (ref 136–145)
T AXIS: -12 DEGREES
T OFFSET: 424 MS
UNIT ABO: NORMAL
UNIT NUMBER: NORMAL
UNIT RH: NORMAL
UNIT VOLUME: 199
UNIT VOLUME: 211
UNIT VOLUME: 213
UNIT VOLUME: 219
UNIT VOLUME: 219
UNIT VOLUME: 220
UNIT VOLUME: 224
UNIT VOLUME: 255
UNIT VOLUME: 275
UNIT VOLUME: 275
UNIT VOLUME: 276
UNIT VOLUME: 283
UNIT VOLUME: 286
UNIT VOLUME: 287
UNIT VOLUME: 291
UNIT VOLUME: 321
UNIT VOLUME: 350
VENTRICULAR RATE: 64 BPM
WBC # BLD AUTO: 16.9 X10*3/UL (ref 4.4–11.3)
WBC # BLD AUTO: 21.5 X10*3/UL (ref 4.4–11.3)
XM INTEP: NORMAL

## 2024-10-17 PROCEDURE — 2500000004 HC RX 250 GENERAL PHARMACY W/ HCPCS (ALT 636 FOR OP/ED)

## 2024-10-17 PROCEDURE — 85610 PROTHROMBIN TIME: CPT | Performed by: STUDENT IN AN ORGANIZED HEALTH CARE EDUCATION/TRAINING PROGRAM

## 2024-10-17 PROCEDURE — 82435 ASSAY OF BLOOD CHLORIDE: CPT

## 2024-10-17 PROCEDURE — 84132 ASSAY OF SERUM POTASSIUM: CPT

## 2024-10-17 PROCEDURE — 2500000002 HC RX 250 W HCPCS SELF ADMINISTERED DRUGS (ALT 637 FOR MEDICARE OP, ALT 636 FOR OP/ED): Performed by: NURSE PRACTITIONER

## 2024-10-17 PROCEDURE — 84132 ASSAY OF SERUM POTASSIUM: CPT | Performed by: STUDENT IN AN ORGANIZED HEALTH CARE EDUCATION/TRAINING PROGRAM

## 2024-10-17 PROCEDURE — 2500000004 HC RX 250 GENERAL PHARMACY W/ HCPCS (ALT 636 FOR OP/ED): Performed by: NURSE PRACTITIONER

## 2024-10-17 PROCEDURE — 2500000005 HC RX 250 GENERAL PHARMACY W/O HCPCS: Performed by: STUDENT IN AN ORGANIZED HEALTH CARE EDUCATION/TRAINING PROGRAM

## 2024-10-17 PROCEDURE — 85025 COMPLETE CBC W/AUTO DIFF WBC: CPT | Performed by: STUDENT IN AN ORGANIZED HEALTH CARE EDUCATION/TRAINING PROGRAM

## 2024-10-17 PROCEDURE — 71045 X-RAY EXAM CHEST 1 VIEW: CPT

## 2024-10-17 PROCEDURE — 71045 X-RAY EXAM CHEST 1 VIEW: CPT | Performed by: RADIOLOGY

## 2024-10-17 PROCEDURE — 2500000002 HC RX 250 W HCPCS SELF ADMINISTERED DRUGS (ALT 637 FOR MEDICARE OP, ALT 636 FOR OP/ED): Performed by: STUDENT IN AN ORGANIZED HEALTH CARE EDUCATION/TRAINING PROGRAM

## 2024-10-17 PROCEDURE — 94640 AIRWAY INHALATION TREATMENT: CPT

## 2024-10-17 PROCEDURE — 99291 CRITICAL CARE FIRST HOUR: CPT | Performed by: NURSE PRACTITIONER

## 2024-10-17 PROCEDURE — 94003 VENT MGMT INPAT SUBQ DAY: CPT

## 2024-10-17 PROCEDURE — 83605 ASSAY OF LACTIC ACID: CPT

## 2024-10-17 PROCEDURE — 83615 LACTATE (LD) (LDH) ENZYME: CPT | Performed by: STUDENT IN AN ORGANIZED HEALTH CARE EDUCATION/TRAINING PROGRAM

## 2024-10-17 PROCEDURE — 82330 ASSAY OF CALCIUM: CPT | Performed by: STUDENT IN AN ORGANIZED HEALTH CARE EDUCATION/TRAINING PROGRAM

## 2024-10-17 PROCEDURE — 2500000004 HC RX 250 GENERAL PHARMACY W/ HCPCS (ALT 636 FOR OP/ED): Performed by: STUDENT IN AN ORGANIZED HEALTH CARE EDUCATION/TRAINING PROGRAM

## 2024-10-17 PROCEDURE — 82947 ASSAY GLUCOSE BLOOD QUANT: CPT

## 2024-10-17 PROCEDURE — 83735 ASSAY OF MAGNESIUM: CPT | Performed by: STUDENT IN AN ORGANIZED HEALTH CARE EDUCATION/TRAINING PROGRAM

## 2024-10-17 PROCEDURE — 99291 CRITICAL CARE FIRST HOUR: CPT | Performed by: STUDENT IN AN ORGANIZED HEALTH CARE EDUCATION/TRAINING PROGRAM

## 2024-10-17 PROCEDURE — 37799 UNLISTED PX VASCULAR SURGERY: CPT | Performed by: STUDENT IN AN ORGANIZED HEALTH CARE EDUCATION/TRAINING PROGRAM

## 2024-10-17 PROCEDURE — 82435 ASSAY OF BLOOD CHLORIDE: CPT | Performed by: STUDENT IN AN ORGANIZED HEALTH CARE EDUCATION/TRAINING PROGRAM

## 2024-10-17 PROCEDURE — 2020000001 HC ICU ROOM DAILY

## 2024-10-17 RX ORDER — IPRATROPIUM BROMIDE AND ALBUTEROL SULFATE 2.5; .5 MG/3ML; MG/3ML
3 SOLUTION RESPIRATORY (INHALATION)
Status: DISPENSED | OUTPATIENT
Start: 2024-10-18

## 2024-10-17 RX ORDER — HYDROMORPHONE HYDROCHLORIDE 1 MG/ML
INJECTION, SOLUTION INTRAMUSCULAR; INTRAVENOUS; SUBCUTANEOUS
Status: COMPLETED
Start: 2024-10-17 | End: 2024-10-17

## 2024-10-17 RX ORDER — HYDROMORPHONE HYDROCHLORIDE 1 MG/ML
0.5 INJECTION, SOLUTION INTRAMUSCULAR; INTRAVENOUS; SUBCUTANEOUS ONCE
Status: COMPLETED | OUTPATIENT
Start: 2024-10-17 | End: 2024-10-17

## 2024-10-17 RX ORDER — NALOXONE HYDROCHLORIDE 0.4 MG/ML
0.2 INJECTION, SOLUTION INTRAMUSCULAR; INTRAVENOUS; SUBCUTANEOUS AS NEEDED
Status: ACTIVE | OUTPATIENT
Start: 2024-10-17

## 2024-10-17 RX ORDER — IPRATROPIUM BROMIDE AND ALBUTEROL SULFATE 2.5; .5 MG/3ML; MG/3ML
3 SOLUTION RESPIRATORY (INHALATION) EVERY 4 HOURS PRN
Status: ACTIVE | OUTPATIENT
Start: 2024-10-17

## 2024-10-17 RX ORDER — HYDROMORPHONE HCL/0.9% NACL/PF 15 MG/30ML
PATIENT CONTROLLED ANALGESIA SYRINGE INTRAVENOUS CONTINUOUS
Status: DISCONTINUED | OUTPATIENT
Start: 2024-10-17 | End: 2024-10-20

## 2024-10-17 RX ORDER — HEPARIN SODIUM 5000 [USP'U]/ML
5000 INJECTION, SOLUTION INTRAVENOUS; SUBCUTANEOUS 3 TIMES DAILY
Status: DISPENSED | OUTPATIENT
Start: 2024-10-17

## 2024-10-17 RX ORDER — IPRATROPIUM BROMIDE AND ALBUTEROL SULFATE 2.5; .5 MG/3ML; MG/3ML
3 SOLUTION RESPIRATORY (INHALATION)
Status: DISCONTINUED | OUTPATIENT
Start: 2024-10-17 | End: 2024-10-17

## 2024-10-17 RX ADMIN — ACETAMINOPHEN 1000 MG: 10 INJECTION INTRAVENOUS at 06:28

## 2024-10-17 RX ADMIN — HYDROMORPHONE HYDROCHLORIDE 0.5 MG: 1 INJECTION, SOLUTION INTRAMUSCULAR; INTRAVENOUS; SUBCUTANEOUS at 10:02

## 2024-10-17 RX ADMIN — HEPARIN SODIUM 5000 UNITS: 5000 INJECTION, SOLUTION INTRAVENOUS; SUBCUTANEOUS at 21:15

## 2024-10-17 RX ADMIN — POLYETHYLENE GLYCOL 3350 17 G: 17 POWDER, FOR SOLUTION ORAL at 08:04

## 2024-10-17 RX ADMIN — Medication 75 MCG/HR: at 07:44

## 2024-10-17 RX ADMIN — Medication 40 PERCENT: at 07:00

## 2024-10-17 RX ADMIN — HYDROMORPHONE HYDROCHLORIDE: 10 INJECTION, SOLUTION INTRAMUSCULAR; INTRAVENOUS; SUBCUTANEOUS at 10:41

## 2024-10-17 RX ADMIN — Medication 40 PERCENT: at 07:45

## 2024-10-17 RX ADMIN — SODIUM CHLORIDE, POTASSIUM CHLORIDE, SODIUM LACTATE AND CALCIUM CHLORIDE 125 ML/HR: 600; 310; 30; 20 INJECTION, SOLUTION INTRAVENOUS at 16:00

## 2024-10-17 RX ADMIN — IPRATROPIUM BROMIDE AND ALBUTEROL SULFATE 3 ML: .5; 3 SOLUTION RESPIRATORY (INHALATION) at 20:43

## 2024-10-17 RX ADMIN — PROPOFOL 50 MCG/KG/MIN: 10 INJECTION, EMULSION INTRAVENOUS at 02:40

## 2024-10-17 RX ADMIN — ACETAMINOPHEN 1000 MG: 10 INJECTION INTRAVENOUS at 17:55

## 2024-10-17 RX ADMIN — POTASSIUM CHLORIDE 20 MEQ: 14.9 INJECTION, SOLUTION INTRAVENOUS at 00:33

## 2024-10-17 RX ADMIN — PANTOPRAZOLE SODIUM 40 MG: 40 INJECTION, POWDER, LYOPHILIZED, FOR SOLUTION INTRAVENOUS at 06:28

## 2024-10-17 RX ADMIN — MAGNESIUM SULFATE HEPTAHYDRATE 4 G: 40 INJECTION, SOLUTION INTRAVENOUS at 00:33

## 2024-10-17 RX ADMIN — Medication 40 PERCENT: at 07:30

## 2024-10-17 RX ADMIN — ACETAMINOPHEN 1000 MG: 10 INJECTION INTRAVENOUS at 11:14

## 2024-10-17 RX ADMIN — ACETAMINOPHEN 1000 MG: 10 INJECTION INTRAVENOUS at 00:33

## 2024-10-17 RX ADMIN — INSULIN LISPRO 2 UNITS: 100 INJECTION, SOLUTION INTRAVENOUS; SUBCUTANEOUS at 00:33

## 2024-10-17 RX ADMIN — SODIUM CHLORIDE, POTASSIUM CHLORIDE, SODIUM LACTATE AND CALCIUM CHLORIDE 125 ML/HR: 600; 310; 30; 20 INJECTION, SOLUTION INTRAVENOUS at 07:00

## 2024-10-17 ASSESSMENT — PAIN SCALES - GENERAL
PAINLEVEL_OUTOF10: 3
PAINLEVEL_OUTOF10: 4
PAINLEVEL_OUTOF10: 9
PAINLEVEL_OUTOF10: 5 - MODERATE PAIN

## 2024-10-17 ASSESSMENT — PAIN - FUNCTIONAL ASSESSMENT
PAIN_FUNCTIONAL_ASSESSMENT: 0-10
PAIN_FUNCTIONAL_ASSESSMENT: 0-10
PAIN_FUNCTIONAL_ASSESSMENT: CPOT (CRITICAL CARE PAIN OBSERVATION TOOL)
PAIN_FUNCTIONAL_ASSESSMENT: 0-10
PAIN_FUNCTIONAL_ASSESSMENT: 0-10

## 2024-10-17 NOTE — ANESTHESIA POSTPROCEDURE EVALUATION
Patient: Facundo Youngblood    Procedure Summary       Date: 10/16/24 Room / Location: [facility] OR 11 / Virtual [facility] OR    Anesthesia Start: 1926 Anesthesia Stop: 2244    Procedure: Exploration Laparotomy, Splenectomy (Abdomen) Diagnosis:     Surgeons: Dianna Westfall MD Responsible Provider: Asher Lyman DO    Anesthesia Type: general ASA Status: 4 - Emergent            Anesthesia Type: general    Vitals Value Taken Time   /81 10/16/24 2241   Temp 36 10/16/24 2246   Pulse 61 10/16/24 2245   Resp 18 10/16/24 2245   SpO2 98 % 10/16/24 2245   Vitals shown include unfiled device data.    Anesthesia Post Evaluation    Patient location during evaluation: ICU  Patient participation: complete - patient participated  Level of consciousness: sedated  Pain score: 0  Pain management: adequate  Airway patency: patent  Cardiovascular status: stable and hypertensive  Respiratory status: ETT and intubated  Hydration status: acceptable  Postoperative Nausea and Vomiting: none        No notable events documented.

## 2024-10-17 NOTE — SIGNIFICANT EVENT
Postoperative Check Note    Subjective  Facundo Youngblood is a 69 y.o. male who is now POD0 s/p exploratory laparotomy with splenectomy. Pt transferred to ICU postoperatively for close monitoring.    Objective  Vitals:  Temp:  [35.8 °C (96.4 °F)-36.9 °C (98.4 °F)] 35.8 °C (96.4 °F)  Heart Rate:  [] 54  Resp:  [0-42] 18  BP: ()/() 119/61  Arterial Line BP 1: (117-196)/(50-82) 123/54  FiO2 (%):  [50 %-60 %] 60 %  Vent Mode: Volume control/assist control  FiO2 (%):  [50 %-60 %] 60 %  S RR:  [18] 18  S VT:  [450 mL] 450 mL  PEEP/CPAP (cm H2O):  [5 cm H20] 5 cm H20  MAP (cm H2O):  [7.8-9] 7.8     Fent 75  Prop 40    Physical Exam:  GEN: Intubated  HEENT: Sclera anicteric. Moist mucous membranes.  RESP: Breathing non-labored, equal chest rise. On  Vent .  CV: Regular rate, normotensive  GI: Abdomen soft, nondistended, unable to elicit pain on palpation. Midline prevena in place with suction. L BECKY with serosanguinous output.  : Correa in place with yellow urine.  MSK: No gross deformities. Moves all extremities spontaneously.  SKIN: No rashes or lesions.    Most recent labs:            12.5     32.8>-----<101              36.7     141  108  25                  ----------------<150     3.8  26  1.75            Mg 1.44         Results from last 7 days   Lab Units 10/17/24  0134   POCT PH, ARTERIAL pH 7.37*   POCT PCO2, ARTERIAL mm Hg 40   POCT PO2, ARTERIAL mm Hg 138*   POCT HCO3 CALCULATED, ARTERIAL mmol/L 23.1   POCT BASE EXCESS, ARTERIAL mmol/L -2.0          Assessment  Facundo Youngblood is a 69 y.o. male who is now POD0 s/p exploratory laparotomy with splenectomy.  Patient is in fair condition. The plan is as follows:    - Continue to monitor BECKY drain, prevena output  - Appreciate remainder of care per ICU    Niurka Seals MD  PGY-1 General Surgery

## 2024-10-17 NOTE — PROGRESS NOTES
Pharmacy Medication History Review    Facundo Youngblood is a 69 y.o. male admitted for Spleen hematoma, initial encounter. Pharmacy reviewed the patient's [id number] medications and allergies for accuracy.    Medications ADDED:  none  Medications CHANGED:  none  Medications REMOVED:   none    The list below reflects the updated PTA list.   Prior to Admission Medications   Prescriptions Last Dose Informant Patient Reported? Taking?   Farxiga 10 mg  Self, Child No No   Sig: Take 1 tablet (10 mg) by mouth once daily.   albuterol (Proventil HFA) 90 mcg/actuation inhaler  Self, Child Yes No   Sig: Inhale 2 puffs 4 times a day.   amLODIPine (Norvasc) 10 mg tablet  Self, Child No No   Sig: TAKE 1 TABLET(10 MG) BY MOUTH DAILY   atorvastatin (Lipitor) 20 mg tablet  Self, Child No No   Sig: TAKE 1 TABLET BY MOUTH DAILY   chlorthalidone (Hygroton) 25 mg tablet  Self, Child No No   Sig: TAKE 1 TABLET BY MOUTH DAILY   losartan (Cozaar) 100 mg tablet  Self, Child No No   Sig: TAKE 1 TABLET BY MOUTH DAILY      Facility-Administered Medications: None        The list below reflects the updated allergy list. Please review each documented allergy for additional clarification and justification.  Allergies  Reviewed by Jhon SaenzD on 10/17/2024        Severity Reactions Comments    Penicillins High Swelling, Other     Ibuprofen Medium GI Upset             Patient accepts M2B at discharge.     Sources:   Review of out patient fill history, OARRS, and patient interview  Interviewed pt, and daughters at bed side, pt is a fair historian, daughter pulled up list from pt's mobile device    Additional Comments:  Pt unsure if allergy was penicillin or ibuprofen he took both at the same time following a dental procedure but the ibuprofen causes GI upset and he thinks the penicillin caused swelling of throat and body.        Shirin Serrano, Chu  PGY-1 Pharmacy Resident  10/17/24     Secure Chat preferred   If no  "response call [id number] or Vocera \"Med Rec\"    "

## 2024-10-17 NOTE — PROGRESS NOTES
[facility]  TRAUMA ICU - PROGRESS NOTE    Patient Name: Facundo Youngblood  MRN: [id number]  Admit Date: 1016  : 1955  AGE: 69 y.o.   GENDER: male  ==============================================================================    TODAY'S ASSESSMENT AND PLAN OF CARE:  Facundo Youngblood is a 69 y.o. male who presented as transfer from OSH for spontaneous splenic rupture. At OSH ED was noted to be hypotensive and 2u pRBC transfused. In [facility] ED MTP was activated and he received 7 units pRBC and 5 FFP and was intubated before proceeding to OR for exploratory laparotomy and splenectomy revealing a ruptured spleen. EBL noted to be 1.5L. In the OR he received 1L crystalloid, 500cc 5% albumin, 3 pRBC, 3 FFP, 1pk plt, TXA. Bronchoscopy performed for mouth foaming/thick secretions suctioned. A drain was left in the surgical bed. UOP was 500cc in the case. He presented to the ICU intubated and sedated on no vasoactive medications. He is in the ICU due to: need for hemodynamic monitoring, resuscitation, acute hypoxic respiratory failure.     Neuro:  - Sedated with propofol drip; titrate to RASS 0- -2  - Pain control with Fentanyl drip    CV:  - Continuous cardiac monitoring  - Maintain MAP >65  - A line  - L subclavian CVC    Pulm:  - Maintain intubation, VC/AC: 18/450/5/50  - ABG as needed  - CPAP trial in AM  - Maintain spO2 >92%  - VAP protocol  - RT q4h bronchial hygiene  - daily CXR while intubated    GI:  - Maintain NPO   - Initiate ulcer prophylaxis if intubated and NPO >48h  - Bulb drain in place in splenic bed, record outputs q4h  - KUB to verify enteric tube placement    :  - Maintain fluids at 125cc/hr  - Monitor electrolytes and replace as clinically indicated  - Maintain Correa catheter  - Strict Is & Os  - Monitor UOP and maintain 0.5-1cc/kg/hr    Endo:   - q4h blood glocose while NPO  - Hypoglycemia protocol  - Moderate SSI while NPO    MSK:  - Padding under  "pressure points    Heme:  - Monitor cbc, transfuse as clinically indicated  - Monitor for s/s anemia  - TEG, CBC, Coags on arrival  - resuscitate to clinical endpoints     ID:  - Monitor SIRS    Skin:   - ICU skin care protocol    PPX:  - SCDs  - Chemoprophylaxis on hold     Dispo: Continue TICU care.    Moncho Simon MD, PhD  Vascular Surgery, PGY3  Trauma, Critical Care, and Acute Care Surgery  [id number]        ==============================================================================  CHIEF COMPLAINT / OVERNIGHT EVENTS / HPI:   Presented from OR intubated and sedated. Relevant details above.    MEDICAL HISTORY / ROS:  Admission history and ROS reviewed. Pertinent changes as follows:  N/a    PHYSICAL EXAM:  Heart Rate:  []   Temp:  [36.8 °C (98.2 °F)-36.9 °C (98.4 °F)]   Resp:  [11-42]   BP: ()/()   Height:  [177.8 cm (5' 10\")]   Weight:  [73.5 kg (162 lb 0.6 oz)]   SpO2:  [90 %-100 %]   Physical Exam  Constitutional: intubated and sedated  Neuro: intubated and sedated  HEENT: No deformities, no scleral icterus, ETT in place  Cardiac: regular rate on monitor  Pulmonary: mechanically ventilated   Abdomen: soft, non-distended, Prevena dressing midline, LUQ drain with 40cc serosanguinous drainage in bulb  : ko in place  Skin: warm and dry  Extremities: no peripheral edema noted  MSK: motor and sensory intact in all extremities  Vascular:   Radial: R: palpable. L: palpable.   DP: R: palpable. L: palpable.       IMAGING SUMMARY:  (summary of new imaging findings, not a copy of dictation)  CTA A/P 10/16/24 (pre-op)  There is a large perisplenic subcapsular hematoma again noted. There  is deformity noted of the underlying spleen. There are multiple  perisplenic irregular regions of hyperdensity overlying the splenic  capsule noted concerning for ongoing active extravasation.  2. There has been interval development of a small amount of  perihepatic free fluid noted. Findings are new when " compared to the  prior study.    LABS:  Results from last 7 days   Lab Units 10/16/24  1653 10/16/24  1447   WBC AUTO x10*3/uL  --  13.4*  13.4*   HEMOGLOBIN g/dL 9.6* 12.1*  12.1*   HEMATOCRIT % 28.3* 35.4*  35.4*   PLATELETS AUTO x10*3/uL  --  186  186   NEUTROS PCT AUTO %  --  75.6   LYMPHS PCT AUTO %  --  11.2   MONOS PCT AUTO %  --  8.9   EOS PCT AUTO %  --  3.3         Results from last 7 days   Lab Units 10/16/24  1447   SODIUM mmol/L 136   POTASSIUM mmol/L 3.2*   CHLORIDE mmol/L 103   CO2 mmol/L 21   BUN mg/dL 29*   CREATININE mg/dL 2.09*   CALCIUM mg/dL 8.4*   PROTEIN TOTAL g/dL 6.9   BILIRUBIN TOTAL mg/dL 0.5   ALK PHOS U/L 51   ALT U/L 7*   AST U/L 11   GLUCOSE mg/dL 105*     Results from last 7 days   Lab Units 10/16/24  1447   BILIRUBIN TOTAL mg/dL 0.5     Results from last 7 days   Lab Units 10/16/24  2107 10/16/24  2007   POCT PH, ARTERIAL pH 7.32* 7.22*   POCT PCO2, ARTERIAL mm Hg 46* 47*   POCT PO2, ARTERIAL mm Hg 205* 276*   POCT HCO3 CALCULATED, ARTERIAL mmol/L 23.7 19.2*   POCT BASE EXCESS, ARTERIAL mmol/L -2.5* -8.4*     I have reviewed all medications, laboratory results, and imaging pertinent for today's encounter.

## 2024-10-17 NOTE — CONSULTS
"Nutrition Initial Assessment:   Nutrition Assessment    Reason for Assessment: Admission nursing screening, Dietitian discretion (MST 2/ pt intubated)    Patient is a 69 y.o. male presented as a transfer from [facility] for spontaneous splenic rupture. On initial ED arrival pt reported that for 4 days he had been having flulike symptoms including nausea, vomiting, diarrhea, hot and cold sweats. Pt was hypotensive and received 2 units of pRBC. Massive transfusion protocol was activated in [facility] ED and pt required 7 units pRBC and 5 FFP and was intubated before proceeding to OR for exploratory laparotomy and splenectomy revealing a ruptured spleen. Drain was left in place. Pt remains intubated and sedated. OGT in place. Pt is NPO.     Other medical problems/history: AAA, Aneurysm of thoracic aorta, heart murmur, hyperlipidemia, hypertension, PVD,  renal artery stenosis, asthma, COPD, dyspnea, lung nodules, GERD, osteoarthritis, wound infection    Nutrition History:  Food and Nutrient History: Unable to obtain nutrition history as pt is intubated and sedated. Propofol @ 6.6ml/hr providing 174kcal/24 hours. MD noted hx of alcohol use however unclear of amount or frequency.    Anthropometrics:  Height: 177.8 cm (5' 10\")    Weight: 73.5kg   BMI 23.25  IBW/kg (Dietitian Calculated): 75.5 kg  Percent of IBW: 97 %       Weight History:   Wt Readings from Last 10 Encounters:   10/16/24 73.5 kg (162 lb 0.6 oz)   10/14/24 79.9 kg (176 lb 3.2 oz)   07/12/24 77.1 kg (170 lb)   05/08/24 80.7 kg (178 lb)   01/12/24 80.4 kg (177 lb 3.2 oz)   11/02/23 78 kg (172 lb)   10/12/23 80.8 kg (178 lb 3.2 oz)   08/31/23 81.6 kg (179 lb 12.8 oz)   05/31/23 79.4 kg (175 lb)   01/30/23 82.1 kg (181 lb)       Weight Change %:  Weight History / % Weight Change: Per weight history, it appears that his weight normally fluctatues around 79-81kg. Current documented weight is down ~6 from UBW. Significant difference in 10/14 and 10/16 weight- 6kg loss in 2 days " highly unlikely. Unable to adequatlely idenitfy actual weight loss at this time.    Nutrition Focused Physical Exam Findings:  Subcutaneous Fat Loss:   Orbital Fat Pads: Mild-Moderate (slight dark circles and slight hollowing)  Buccal Fat Pads: Defer  Triceps: Defer  Ribs: Defer  Muscle Wasting:  Temporalis: Mild-Moderate (slight depression)  Pectoralis (Clavicular Region): Mild-Moderate (some protrusion of clavicle)  Deltoid/Trapezius: Mild-Moderate (slight protrusion of acromion process)  Interosseous: Defer  Trapezius/Infraspinatus/Supraspinatus (Scapular Region): Defer  Quadriceps: Well nourished (well developed, well rounded)  Gastrocnemius: Defer  Edema:  Edema:  (non-pitting)  Edema Location: generalized  Physical Findings:  Skin:  (surgical site + wound vac to abdomen)    Nutrition Significant Labs:  CBC Trend:   Results from last 7 days   Lab Units 10/17/24  0451 10/16/24  2236 10/16/24  1653 10/16/24  1447   WBC AUTO x10*3/uL 21.5* 32.8*  --  13.4*  13.4*   RBC AUTO x10*6/uL 3.53* 4.06*  --  3.45*  3.45*   HEMOGLOBIN g/dL 10.9* 12.5*   < > 12.1*  12.1*   HEMATOCRIT % 31.7* 36.7*   < > 35.4*  35.4*   MCV fL 90 90  --  103*  103*   PLATELETS AUTO x10*3/uL 86* 101*  --  186  186    < > = values in this interval not displayed.    , BMP Trend:   Results from last 7 days   Lab Units 10/17/24  0451 10/16/24  2236 10/16/24  1447   GLUCOSE mg/dL 115* 150* 105*   CALCIUM mg/dL 8.2* 8.6 8.4*   SODIUM mmol/L 141 141 136   POTASSIUM mmol/L 4.1 3.8 3.2*   CO2 mmol/L 25 26 21   CHLORIDE mmol/L 108* 108* 103   BUN mg/dL 28* 25* 29*   CREATININE mg/dL 2.03* 1.75* 2.09*    , Renal Lab Trend:   Results from last 7 days   Lab Units 10/17/24  0451 10/16/24  2236 10/16/24  1447 10/16/24  1447   POTASSIUM mmol/L 4.1 3.8  --  3.2*   PHOSPHORUS mg/dL 4.8 5.7*   < >  --    SODIUM mmol/L 141 141  --  136   MAGNESIUM mg/dL 2.61* 1.44*   < >  --    EGFR mL/min/1.73m*2 35* 42*  --  34*   BUN mg/dL 28* 25*  --  29*   CREATININE  mg/dL 2.03* 1.75*  --  2.09*    < > = values in this interval not displayed.        Nutrition Specific Medications:  Scheduled medications  acetaminophen, 1,000 mg, intravenous, q6h DEVORAH  insulin lispro, 0-10 Units, subcutaneous, q4h  [Held by provider] losartan, 100 mg, oral, Daily  pantoprazole, 40 mg, oral, Daily before breakfast   Or  pantoprazole, 40 mg, intravenous, Daily before breakfast  polyethylene glycol, 17 g, oral, Daily    Continuous medications  HYDROmorphone,   lactated Ringer's, 125 mL/hr, Last Rate: 125 mL/hr (10/17/24 1100)      I/O:    ;  I/O last 3 completed shifts:  In: 5043.2 [I.V.:2293.2; Blood:2450; IV Piggyback:300]  Out: 1215 [Urine:985; Emesis/NG output:10; Drains:220]  I/O this shift:  In: 678.9 [I.V.:678.9]  Out: 350 [Urine:200; Drains:150]        Dietary Orders (From admission, onward)       Start     Ordered    10/16/24 2327  May Not Participate in Room Service  ( ROOM SERVICE MAY NOT PARTICIPATE)  Once        Question:  .  Answer:  Yes    10/16/24 2326    10/16/24 2230  NPO Diet; Effective now  Diet effective now         10/16/24 2229                     Estimated Needs:   Total Energy Estimated Needs (kCal): 1680 kCal  Method for Estimating Needs: PSU REE (MV 10.5L/min) x 73.5kg  Total Protein Estimated Needs (g): 96 g  Method for Estimating Needs: 1.3g/kg CBW +  Total Fluid Estimated Needs (mL):  (per MD/team)  Method for Estimating Needs: per MD/team        Nutrition Diagnosis   Malnutrition Diagnosis  Patient has Malnutrition Diagnosis: No (pt does how some signs of fat/muscle losses howeve unable to determine at this time- without further subjective informationa or obejctive data)    Nutrition Diagnosis  Patient has Nutrition Diagnosis: Yes  Diagnosis Status (1): New  Nutrition Diagnosis 1: Increased nutrient needs  Related to (1): increased metabolic demand  As Evidenced by (1): splenectomy + wound vac       Nutrition Interventions/Recommendations         Nutrition  Prescription:  Individualized Nutrition Prescription Provided for : enteral nutrition  Start TF when medically feasible   On propofol: Pivot 1.5 @ 35ml/hr goal+ 1 pkt prostat   Off porpofol: Pivot 1.5 @ 45ml/hr   Start @ 15ml/hr and increase by [id number]     FWF per MD/team discretion         Nutrition Interventions:   Interventions: Enteral intake  Enteral Intake: Other (Comment)  Goal: Start TF as able    Pivot @ 35 + 1 pkt prostat = 1360kcal (1535w/ propofol), 96gm protein, and 630ml free water     Pivot @ 45ml/hr = 1620kcal, 102gm protein, and 810ml free water     Nutrition Monitoring and Evaluation   Food/Nutrient Related History Monitoring  Monitoring and Evaluation Plan: Enteral and parenteral nutrition intake  Enteral and Parenteral Nutrition Intake: Enteral nutrition intake  Criteria: Start TF  as able         Biochemical Data, Medical Tests and Procedures  Monitoring and Evaluation Plan: Electrolyte/renal panel, Glucose/endocrine profile  Criteria: labs WNL              Time Spent (min): 60 minutes

## 2024-10-17 NOTE — ANESTHESIA PREPROCEDURE EVALUATION
Patient: Facundo Youngblood    Procedure Information       Anesthesia Start Date/Time: 10/16/24 1926    Procedure: Exploration Laparotomy (Abdomen)    Location: [facility] OR 11 / [facility] OR    Surgeons: Dianna Westfall MD            Relevant Problems   Cardiac   (+) Abdominal aortic aneurysm (AAA) without rupture (CMS-HCC)   (+) Aneurysm of thoracic aorta (CMS-HCC)   (+) Heart murmur   (+) Hyperlipidemia   (+) Hypertension   (+) Mixed hyperlipidemia   (+) Peripheral vascular disease (CMS-HCC)   (+) Renal artery stenosis (CMS-HCC)      Pulmonary   (+) Asthma   (+) COPD type A (Multi)   (+) Dyspnea on exertion   (+) Lung nodules      Neuro   (+) Chronic tension-type headache   (+) Zoster ophthalmicus      GI   (+) Gastro-esophageal reflux disease without esophagitis      Musculoskeletal   (+) Osteoarthritis   (+) Osteoarthritis of right knee      ID   (+) Infection of scalp   (+) Local infection of wound   (+) Zoster ophthalmicus       Clinical information reviewed:    Allergies                NPO Detail:  No data recorded     PHYSICAL EXAM    Anesthesia Plan    History of general anesthesia?: unknown/emergency  History of complications of general anesthesia?: unknown/emergency    ASA 4 - emergent     general       Plan discussed with attending and resident.

## 2024-10-17 NOTE — PROGRESS NOTES
I was present in the OR during a portion of packing and splenectomy to offer additional assistance with the case. No billing.

## 2024-10-17 NOTE — CARE PLAN
Problem: Skin  Goal: Participates in plan/prevention/treatment measures  10/17/2024 0817 by Ani Costa RN  Outcome: Progressing  Flowsheets (Taken 10/17/2024 0817)  Participates in plan/prevention/treatment measures: Elevate heels  10/17/2024 0817 by Ani Costa RN  Outcome: Progressing  Flowsheets (Taken 10/17/2024 0817)  Participates in plan/prevention/treatment measures: Elevate heels  10/17/2024 0816 by Ani Costa RN  Outcome: Progressing  Goal: Promote skin healing  10/17/2024 0817 by Ani Costa RN  Outcome: Progressing  Flowsheets (Taken 10/17/2024 0817)  Promote skin healing:   Assess skin/pad under line(s)/device(s)   Protective dressings over bony prominences   Turn/reposition every 2 hours/use positioning/transfer devices   Rotate device position/do not position patient on device  10/17/2024 0817 by Ani Costa RN  Outcome: Progressing  Flowsheets (Taken 10/17/2024 0817)  Promote skin healing:   Assess skin/pad under line(s)/device(s)   Protective dressings over bony prominences   Turn/reposition every 2 hours/use positioning/transfer devices   Rotate device position/do not position patient on device  10/17/2024 0816 by Ani Costa RN  Outcome: Progressing     Problem: Pain - Adult  Goal: Verbalizes/displays adequate comfort level or baseline comfort level  10/17/2024 0817 by Ani Costa RN  Outcome: Progressing  10/17/2024 0817 by Ani Costa RN  Outcome: Progressing  10/17/2024 0816 by Ani Costa RN  Outcome: Progressing     Problem: Safety - Adult  Goal: Free from fall injury  10/17/2024 0817 by Ani Costa RN  Outcome: Progressing  10/17/2024 0817 by Ani Costa RN  Outcome: Progressing  10/17/2024 0816 by Ani Costa RN  Outcome: Progressing     Problem: Discharge Planning  Goal: Discharge to home or other facility with appropriate resources  10/17/2024 0817 by Ani  JACKIE Costa  Outcome: Progressing  10/17/2024 0817 by Ani Costa RN  Outcome: Progressing  10/17/2024 0816 by Ani Costa RN  Outcome: Progressing     Problem: Chronic Conditions and Co-morbidities  Goal: Patient's chronic conditions and co-morbidity symptoms are monitored and maintained or improved  10/17/2024 0817 by Ani Costa RN  Outcome: Progressing  10/17/2024 0817 by Ani Costa RN  Outcome: Progressing  10/17/2024 0816 by Ani Costa RN  Outcome: Progressing     Problem: Safety - Medical Restraint  Goal: Remains free of injury from restraints (Restraint for Interference with Medical Device)  10/17/2024 0817 by Ani Costa RN  Outcome: Progressing  10/17/2024 0817 by Ani Costa RN  Outcome: Progressing    The clinical goals for the shift include patient will remain hemodynamically stable

## 2024-10-17 NOTE — PROGRESS NOTES
[facility]  ACUTE CARE SURGERY - HISTORY AND PHYSICAL / CONSULT    Patient Name: Facundo Youngblood  MRN: [id number]  Admit Date: 1016  : 1955  AGE: 69 y.o.   GENDER: male  ==============================================================================  TODAY'S ASSESSMENT AND PLAN OF CARE:  70 y/o M with PMHx significant for HTN transferred from [facility] for spontaneous splenic rupture, now status-post ex-lap for splenectomy. Patient remains intubated this AM.    Recommendations:  - Recommend ongoing trending of CBC  - Continued resuscitation per ICU  - Recommend SBT for possible extubation today  - When on the floor, patient will require splenectomy vaccines.    Plans discussed with Dr. Chamorro, attending  Kedar Colunga MD  PGY-1  P. [phone]    ==============================================================================  CHIEF COMPLAINT/REASON FOR CONSULT:  No new updates    PAST MEDICAL HISTORY:   PMH: HTN    PSH: None  Past Surgical History:   Procedure Laterality Date    CT ABDOMEN PELVIS ANGIOGRAM W AND/OR WO IV CONTRAST  2022    CT ABDOMEN PELVIS ANGIOGRAM W AND/OR WO IV CONTRAST [facility]    CT ABDOMEN PELVIS ANGIOGRAM W AND/OR WO IV CONTRAST  3/10/2023    CT ABDOMEN PELVIS ANGIOGRAM W AND/OR WO IV CONTRAST [facility]     FH:   Family History   Problem Relation Name Age of Onset    Cancer Mother      Diabetes Mother       SOCIAL HISTORY:    Smoking:    Social History     Tobacco Use   Smoking Status Every Day    Types: Cigars   Smokeless Tobacco Never   Tobacco Comments    cigars       Alcohol:    Social History     Substance and Sexual Activity   Alcohol Use Yes       Drug use:     MEDICATIONS:   Prior to Admission medications    Medication Sig Start Date End Date Taking? Authorizing Provider   albuterol (Proventil HFA) 90 mcg/actuation inhaler Inhale 2 puffs 4 times a day. 22   Historical Provider, MD   amLODIPine (Norvasc)  10 mg tablet TAKE 1 TABLET(10 MG) BY MOUTH DAILY 6/3/24   Isabel Chiu MD   atorvastatin (Lipitor) 20 mg tablet TAKE 1 TABLET BY MOUTH DAILY 6/3/24   Isabel Chiu MD   chlorthalidone (Hygroton) 25 mg tablet TAKE 1 TABLET BY MOUTH DAILY 6/10/24   Isabel Chiu MD   Farxiga 10 mg Take 1 tablet (10 mg) by mouth once daily. 10/14/24 10/14/25  Gely Wilcox MD   losartan (Cozaar) 100 mg tablet TAKE 1 TABLET BY MOUTH DAILY 6/3/24   MD Sharif Mcgeexiga 10 mg Take 1 tablet (10 mg) by mouth once daily. 5/8/24 10/14/24  Gely Wilcox MD     ALLERGIES:   Allergies   Allergen Reactions    Penicillins Swelling and Other    Ibuprofen GI Upset       REVIEW OF SYSTEMS:  Unable to be obtained at this time.   PHYSICAL EXAM:  GEN: Intubated  HEENT: Sclera anicteric. Moist mucous membranes.  RESP: Breathing non-labored, equal chest rise. On  Vent .  CV: Regular rate, normotensive  GI: Abdomen soft, nondistended, unable to elicit pain on palpation. Midline prevena in place with suction. L BECKY with serosanguinous output.  : Correa in place with yellow urine.  MSK: No gross deformities. Moves all extremities spontaneously.  SKIN: No rashes or lesions.  IMAGING SUMMARY:  (summary of findings, not a copy of dictation)  No new imaging    LABS:  Results from last 7 days   Lab Units 10/17/24  1154 10/17/24  0451 10/16/24  2236   WBC AUTO x10*3/uL 16.9* 21.5* 32.8*   HEMOGLOBIN g/dL 10.9* 10.9* 12.5*   HEMATOCRIT % 31.9* 31.7* 36.7*   PLATELETS AUTO x10*3/uL 94* 86* 101*   NEUTROS PCT AUTO % 78.2 83.1 90.0   LYMPHS PCT AUTO % 4.3 5.2 1.5   MONOS PCT AUTO % 16.5 11.0 7.3   EOS PCT AUTO % 0.1 0.0 0.0     Results from last 7 days   Lab Units 10/17/24  1154 10/16/24  2236   APTT seconds 28 31   INR  1.1 1.1     Results from last 7 days   Lab Units 10/17/24  1154 10/17/24  0451 10/16/24  2236 10/16/24  1447   SODIUM mmol/L 141 141 141 136   POTASSIUM mmol/L 3.7 4.1 3.8 3.2*   CHLORIDE mmol/L 108* 108* 108* 103   CO2 mmol/L 24  25 26 21   BUN mg/dL 28* 28* 25* 29*   CREATININE mg/dL 2.04* 2.03* 1.75* 2.09*   CALCIUM mg/dL 8.0* 8.2* 8.6 8.4*   PROTEIN TOTAL g/dL  --   --   --  6.9   BILIRUBIN TOTAL mg/dL  --   --   --  0.5   ALK PHOS U/L  --   --   --  51   ALT U/L  --   --   --  7*   AST U/L  --   --   --  11   GLUCOSE mg/dL 116* 115* 150* 105*     Results from last 7 days   Lab Units 10/16/24  1447   BILIRUBIN TOTAL mg/dL 0.5     Results from last 7 days   Lab Units 10/17/24  0451 10/17/24  0134 10/16/24  2235   POCT PH, ARTERIAL pH 7.40 7.37* 7.33*   POCT PCO2, ARTERIAL mm Hg 40 40 45*   POCT PO2, ARTERIAL mm Hg 108* 138* 76*   POCT HCO3 CALCULATED, ARTERIAL mmol/L 24.8 23.1 23.7   POCT BASE EXCESS, ARTERIAL mmol/L 0.0 -2.0 -2.4*         I have reviewed all laboratory and imaging results ordered/pertinent for this encounter.

## 2024-10-17 NOTE — PROGRESS NOTES
[facility]  TRAUMA ICU - PROGRESS NOTE    Patient Name: Facundo Youngblood  MRN: [id number]  Admit Date: 1016  : 1955  AGE: 69 y.o.   GENDER: male  ==============================================================================    TODAY'S ASSESSMENT AND PLAN OF CARE:  Facundo Youngblood is a 69 y.o. male who presented as transfer from OSH for spontaneous splenic rupture. At OSH ED was noted to be hypotensive and 2u pRBC transfused. In [facility] ED MTP was activated and he received 7 units pRBC and 5 FFP and was intubated before proceeding to OR for exploratory laparotomy and splenectomy revealing a ruptured spleen. EBL noted to be 1.5L. In the OR he received 1L crystalloid, 500cc 5% albumin, 3 pRBC, 3 FFP, 1pk plt, TXA. Bronchoscopy performed for mouth foaming/thick secretions suctioned. A drain was left in the surgical bed. UOP was 500cc in the case. He presented to the ICU intubated and sedated on no vasoactive medications. He is in the ICU due to: need for hemodynamic monitoring, resuscitation, acute hypoxic respiratory failure.     Neuro:  #Acute pain secondary to surgery.  Patient provided IV Tylenol scheduled as well as a Dilaudid PCA.  Will continue to monitor effect.    CV: #History of hypertension, hyperlipidemia.  Patient takes losartan 100 mg daily, amlodipine 10 mg daily, and chlorthalidone 25 mg daily.  Will continue to hold these medications as patient is NPO.  Will provide hydralazine 10 mg IV every 6 as needed for systolic blood pressure greater than 160.  Will adjust as needed.  Goal MAP greater than 65.  Continuous cardiac monitoring.    Pulm: #History of emphysema.  Patient extubated this morning without issue.  Maintain goal SBP O2 greater than 88%.  Will order DuoNebs as needed.  Will encourage incentive spirometry every 1 hour.  Will encourage out of bed as soon as possible.    GI: #Spontaneous splenic rupture status/post splenectomy.  NG to low  intermittent wall suction.  Patient is to remain strict n.p.o. at this time.  Will reassess on 10/18/2024.  Bulb drain in place in splenic bed.  Will continue to monitor outputs and consistency.  Prevena wound VAC overlying midline incision.    : #SYLVIA on CKD.  #History of CKD 3B with baseline creatinine of 1.8-1.9; #history of renal artery stenosis status post stents.  Will continue LR at 125 cc/h in setting of n.p.o.  Will continue to monitor electrolytes and replace as clinically indicated.  Will continue Correa catheter in setting of recent major hemorrhage and an effort to adequately assess volume status and success of resuscitation.  With goal urine output greater than 0.5 cc/kg/h.    Heme:   #Acute blood loss anemia.  Patient did receive massive transfusion protocol.  Currently hemoglobin is 10.9 from 12.5.  Will obtain another CBC this afternoon as well as daily CBCs.  Will continue to monitor for clinical signs of anemia and will transfuse as appropriate.  Postop TEG was normal.    #Thrombocytopenia.  Obtaining another CBC this afternoon as well as daily CBCs.  Will continue to monitor.    Endo: No active issues.  Blood glucose between 115 and 150 since admission.  Patient does have a moderate sliding scale with every 4 blood glucose checks.  Will maintain hypoglycemia protocol.    MSK/Skin: #At risk for skin breakdown.  Continue ICU skin protocol including assisting with every 2 hours turns, encouraging out of bed, Mepilex to bony prominences.    ID: #Leukocytosis.  Likely secondary to surgical intervention.  Will continue to monitor.  Patient has been afebrile.  No need for antibiotics at this time.    PPX: No GI prophylaxis necessary at this time.  Patient with SCDs.  Currently holding DVT chemoprophylaxis in the setting of major hemorrhage requiring MTP.    Lines: Left subclavian, radial art line, Correa, NG, wound VAC    Dispo: Continue TICU care.    Patient seen and discussed with Dr. Kev Newman  "TIANA Kruger-CNP  Trauma, Critical Care, ACS  [id number]/ Epic chat     Total face to face time spent with patient/family of 45 minutes, with >50% of the time spent discussing plan of care/management, counseling/educating on disease processes, explaining results of diagnostic testing.     ==============================================================================  CHIEF COMPLAINT / OVERNIGHT EVENTS / HPI:   No significant events since transfer from OR.  Patient extubated this morning.  Pain controlled is his biggest concern at this moment.    MEDICAL HISTORY / ROS:  Admission history and ROS reviewed. Pertinent changes as follows:  N/a    PHYSICAL EXAM:  Heart Rate:  []   Temp:  [35.8 °C (96.4 °F)-37.1 °C (98.8 °F)]   Resp:  [0-42]   BP: ()/()   Height:  [177.8 cm (5' 10\")]   Weight:  [73.5 kg (162 lb 0.6 oz)]   SpO2:  [90 %-100 %]   Physical Exam  Constitutional: Patient lying in bed.  Ill/uncomfortable.  Neuro: GCS 15, A+ O x 3.  Moving all extremities.  HEENT: No deformities, no scleral icterus.  NG tube in place to low intermittent wall suction.  Cardiac: Regular rate and rhythm.  S1, S2.  And is warm and dry.  2+ radial and DP pulses  Pulmonary: Breathing comfortably on 2 L nasal cannula.  Abdomen: soft, non-distended, Prevena dressing midline, LUQ drain with serosanguinous drainage in bulb  : ko in place with clear yellow urine.  MSK: motor and sensory intact in all extremities    IMAGING SUMMARY:  (summary of new imaging findings, not a copy of dictation)  N/A    LABS:  Results from last 7 days   Lab Units 10/17/24  0451 10/16/24  2236 10/16/24  1653 10/16/24  1447   WBC AUTO x10*3/uL 21.5* 32.8*  --  13.4*  13.4*   HEMOGLOBIN g/dL 10.9* 12.5* 9.6* 12.1*  12.1*   HEMATOCRIT % 31.7* 36.7* 28.3* 35.4*  35.4*   PLATELETS AUTO x10*3/uL 86* 101*  --  186  186   NEUTROS PCT AUTO % 83.1 90.0  --  75.6   LYMPHS PCT AUTO % 5.2 1.5  --  11.2   MONOS PCT AUTO % 11.0 7.3  --  8.9   EOS PCT AUTO " % 0.0 0.0  --  3.3     Results from last 7 days   Lab Units 10/16/24  2236   APTT seconds 31   INR  1.1     Results from last 7 days   Lab Units 10/17/24  0451 10/16/24  2236 10/16/24  1447   SODIUM mmol/L 141 141 136   POTASSIUM mmol/L 4.1 3.8 3.2*   CHLORIDE mmol/L 108* 108* 103   CO2 mmol/L 25 26 21   BUN mg/dL 28* 25* 29*   CREATININE mg/dL 2.03* 1.75* 2.09*   CALCIUM mg/dL 8.2* 8.6 8.4*   PROTEIN TOTAL g/dL  --   --  6.9   BILIRUBIN TOTAL mg/dL  --   --  0.5   ALK PHOS U/L  --   --  51   ALT U/L  --   --  7*   AST U/L  --   --  11   GLUCOSE mg/dL 115* 150* 105*     Results from last 7 days   Lab Units 10/16/24  1447   BILIRUBIN TOTAL mg/dL 0.5     Results from last 7 days   Lab Units 10/17/24  0451 10/17/24  0134 10/16/24  2235   POCT PH, ARTERIAL pH 7.40 7.37* 7.33*   POCT PCO2, ARTERIAL mm Hg 40 40 45*   POCT PO2, ARTERIAL mm Hg 108* 138* 76*   POCT HCO3 CALCULATED, ARTERIAL mmol/L 24.8 23.1 23.7   POCT BASE EXCESS, ARTERIAL mmol/L 0.0 -2.0 -2.4*     I have reviewed all medications, laboratory results, and imaging pertinent for today's encounter.

## 2024-10-17 NOTE — OP NOTE
Exploration Laparotomy, Splenectomy Operative Note     Date: 10/16/2024  OR Location: [facility] OR    Name: Facundo Youngblood, : 1955, Age: 69 y.o., MRN: [id number], Sex: male    Diagnosis  * No Diagnosis Codes entered * * No Diagnosis Codes entered *     Procedures  Exploration Laparotomy, Splenectomy  73969 - DE EXPLORATORY LAPAROTOMY CELIOTOMY W/WO BIOPSY SPX  Splenectomy  Placement of 19Fr nadira drain    Surgeons      * Dianna Westfall - Primary    Resident/Fellow/Other Assistant:  Surgeons and Role:     * Avi Chamorro MD - Assisting     * Mackenzie A Simerlink, MD - Resident - Assisting    Procedure Summary  Anesthesia: General  ASA: IV  Anesthesia Staff: Anesthesiologist: Asher Lyman DO  Anesthesia Resident: Ronda Valdivia MD  Estimated Blood Loss: 1500mL  Intra-op Medications:   Administrations occurring from  to  on 10/16/24:   Medication Name Total Dose   sodium chloride 0.9 % irrigation solution 3,000 mL   norepinephrine (Levophed) 8 mg in dextrose 5% 250 mL (0.032 mg/mL) infusion (premix) 0.58 mg   oxygen (O2) therapy Cannot be calculated   oxygen (O2) therapy Cannot be calculated   oxygen (O2) therapy Cannot be calculated   propofol (Diprivan) infusion 55.13 mg   calcium gluconate 2 g in sodium chloride (iso)  mL 100 mL   ketamine in NaCl, iso-osmotic injection  - Omnicell Override Pull Cannot be calculated   ketamine injection 100 mg 100 mg   propofol (Diprivan) bolus from bag 20 mg 20 mg   succinylcholine (Anectine) injection 120 mg 120 mg   calcium chloride 10% 2 g   ceFAZolin (Ancef) vial 1 g 2 g   fentaNYL PF 0.05 mg/mL 100 mcg   HYDROmorphone (Dilaudid) 1 mg/mL injection 1 mg   ketamine 10 mg/mL IV syringe 50 mg   lidocaine (Xylocaine) injection 2 % 80 mg   phenylephrine 40 mcg/mL syringe 10 mL 120 mcg   propofol (Diprivan) bolus from bag 20 mg 80 mg   rocuronium (ZeMuron) 50 mg/5 mL injection 50 mg   sodium bicarbonate injection 1 mEq/mL 100 mEq   tranexamic acid  injection 100 mg/mL 1,000 mg              Anesthesia Record               Intraprocedure I/O Totals          Intake    Tranexamic Acid 0.00 mL    The total shown is the total volume documented since Anesthesia Start was filed.    Propofol Drip 0.00 mL    The total shown is the total volume documented since Anesthesia Start was filed.    Total Intake 0 mL       Output    Urine 450 mL    Total Output 450 mL       Net    Net Volume -450 mL          Specimen:   ID Type Source Tests Collected by Time   1 : Spleen Tissue SPLEEN SURGICAL PATHOLOGY EXAM Dianna Westfall MD 10/16/2024 2052        Staff:   Taniaulator: Radha  Scrub Person: Lisa  Circulator: Isabel  Scrub Person: Christina  Circulator: Tereas         Drains and/or Catheters:   Closed/Suction Drain LLQ Bulb 19 Fr. (Active)   Site Description Healing 10/16/24 2230   Dressing Status Clean;Dry 10/16/24 2230   Drainage Appearance Bright red 10/16/24 2230   Status To bulb suction 10/16/24 2230   Output (mL) 120 mL 10/16/24 2230       Urethral Catheter Non-latex 16 Fr. (Active)   Site Assessment Clean;Skin intact 10/16/24 2230   Collection Container Standard drainage bag 10/16/24 2230   Securement Method Securing device (Describe) 10/16/24 2230   Reason for Continuing Urinary Catheterization accurate hourly measurement of urine volume in a critically ill patient that cannot be assessed by other volumes and urine collection strategies 10/16/24 2230   Output (mL) 150 mL 10/16/24 2230       [REMOVED] Closed/Suction Drain Right RLQ Bulb 19 Fr. (Removed)       Tourniquet Times:         Implants:     Findings: Ruptured spleen    Indications: Facundo Youngblood is an 69 y.o. male who is having surgery for * No pre-op diagnosis entered *. Patient with spontaneous splenic rupture.    The patient was seen in the preoperative area. The risks, benefits, complications, treatment options, non-operative alternatives, expected recovery and outcomes were discussed with the patient.  The possibilities of reaction to medication, pulmonary aspiration, injury to surrounding structures, bleeding, recurrent infection, the need for additional procedures, failure to diagnose a condition, and creating a complication requiring transfusion or operation were discussed with the patient. The patient concurred with the proposed plan, giving informed consent.  The site of surgery was properly noted/marked if necessary per policy. The patient has been actively warmed in preoperative area. Preoperative antibiotics have been ordered and given within 1 hours of incision. Venous thrombosis prophylaxis have been ordered including bilateral sequential compression devices    Procedure Details: The patient was brought to the operating room intubated.  The anesthesia team and arterial and central venous catheter.  The patient was prepped and draped in the usual sterile fashion.  A midline laparotomy was performed.  Upon entry into the abdominal cavity, the patient had a significant amount of blood.  We packed all 4 quadrants until hemostasis was achieved.  The Bookwalter was set up for maximum exposure.  We then focused our attention on the left upper quadrant.  Upon entry into the abdominal cavity, dissection was performed with blunt technique with my hand following the convex surface of the spleen leading to the identification of the peritoneal attachments.  The spleen was gently grasped and this was medially towards the incision.  The avascular peritoneal attachments of ligament were incised.  We could not identify the suspensory ligaments secondary to splenic rupture.  The 60 Endo HILTON white stapler was used to ligate the splenic vessels in the hilum.  Given the residual vessels, we used another 60 Endo HILTON white staple load.  At this point Dr. Chamorro scrubbed to evaluate the bleeding.  At this point the spleen remnants was removed and given to the scrub tech for permanent pathology.  Next the short gastric  vessels were identified and ligated with 2-0 silk sutures and medium clips. Hemostasis was obtained.  A 19 Persian [place] drain was placed in the splenic bed.  The abdominal cavity was irrigated extensively with warm saline.  All packs were removed and noted to be complete we did not insert Floseal around the arterial stump lines in the splenic bed.  The patient was closed with two #1 PDS sutures and a Beckie was placed.  Complications:  None; patient tolerated the procedure well.    Disposition: PACU - hemodynamically stable.  Condition: stable     Attending Attestation:     Dianna Westfall  Phone Number: [phone]

## 2024-10-17 NOTE — BRIEF OP NOTE
Date: 10/16/2024  OR Location: [facility] OR    Name: Facundo Youngblood, : 1955, Age: 69 y.o., MRN: [id number], Sex: male    Diagnosis  * No Diagnosis Codes entered * * No Diagnosis Codes entered *     Procedures  Exploration Laparotomy  74444 - MO EXPLORATORY LAPAROTOMY CELIOTOMY W/WO BIOPSY SPX      Surgeons      * Dianna Westfall - Primary    Resident/Fellow/Other Assistant:  Surgeons and Role:     * Avi Chamorro MD - Assisting     * Mackenzie A Simerlink, MD - Resident - Assisting    Procedure Summary  Anesthesia: Anesthesia type not filed in the log.  ASA: ASA status not filed in the log.  Anesthesia Staff: Anesthesiologist: Asher Lyman DO  Anesthesia Resident: Ronda Valdivia MD  Estimated Blood Loss: 1500mL  Intra-op Medications:   Administrations occurring from  to  on 10/16/24:   Medication Name Total Dose   sodium chloride 0.9 % irrigation solution 3,000 mL   calcium chloride 10% 2 g   ceFAZolin (Ancef) vial 1 g 2 g   fentaNYL PF 0.05 mg/mL 100 mcg   HYDROmorphone (Dilaudid) 1 mg/mL injection 1 mg   ketamine 10 mg/mL IV syringe 50 mg   lidocaine (Xylocaine) injection 2 % 80 mg   norepinephrine (Levophed) 8 mg in dextrose 5% 250 mL (0.032 mg/mL) infusion (premix) 0.58 mg   oxygen (O2) therapy Cannot be calculated   phenylephrine 40 mcg/mL syringe 10 mL 120 mcg   propofol (Diprivan) bolus from bag 20 mg 80 mg   propofol (Diprivan) infusion 399.11 mg   rocuronium (ZeMuron) 50 mg/5 mL injection 50 mg   sodium bicarbonate injection 1 mEq/mL 100 mEq   tranexamic acid injection 100 mg/mL 1,000 mg   calcium gluconate 2 g in sodium chloride (iso)  mL Cannot be calculated   ketamine in NaCl, iso-osmotic injection  - Omnicell Override Pull Cannot be calculated   ketamine injection 100 mg 100 mg   propofol (Diprivan) bolus from bag 20 mg 20 mg   succinylcholine (Anectine) injection 120 mg 120 mg              Anesthesia Record               Intraprocedure I/O Totals          Intake     Tranexamic Acid 0.00 mL    The total shown is the total volume documented since Anesthesia Start was filed.    Total Intake 0 mL       Output    Urine 300 mL    Total Output 300 mL       Net    Net Volume -300 mL          Specimen:   ID Type Source Tests Collected by Time   1 : Spleen Tissue SPLEEN SURGICAL PATHOLOGY EXAM Dianna Westfall MD 10/16/2024 2052        Staff:   Circulator: Radha  Scrub Person: Lisa  Circulator: Isabel Velezub Person: Christina  Circulator: Teresa          Findings: approximately 1500 ml of blood and clot re  Moved from abdomen. Spleen encountered in multiple pieces. Splenic bed hemostatic. 19 Fr drain and floseal left in splenic bed.    Complications:  None; patient tolerated the procedure well.     Disposition: ICU - intubated and hemodynamically stable.  Condition: stable  Specimens Collected:   ID Type Source Tests Collected by Time   1 : Spleen Tissue SPLEEN SURGICAL PATHOLOGY EXAM Dianna Westfall MD 10/16/2024 2052     Attending Attestation:     Dianna Westfall  Phone Number: [phone]

## 2024-10-17 NOTE — ANESTHESIA PROCEDURE NOTES
Central Venous Line:    Date/Time: 10/16/2024 7:40 PM    A central venous line was placed in the OR for the following indication(s): central venous access and CVP monitoring.  Staffing  Performed: resident   Authorized by: Asher Lyman DO    Performed by: Ronda Valdivia MD    Sterility preparation included the following: provider hand hygiene performed prior to central venous catheter insertion, all 5 sterile barriers used (gloves, gown, cap, mask, large sterile drape) during central venous catheter insertion, antiseptic used during central venous catheter insertion and skin prep agent completely dried prior to procedure.  The patient was placed in Trendelenburg position.    Left subclavian vein was prepped.    The site was prepped with Chlorhexidine.  Size: 9 Fr   Length: 11.5 (16 cm)  Catheter type: introducer   Number of Lumens: double lumen    This catheter was not an oximetric catheter.    During the procedure, the following specific steps were taken: target vein identified, needle advanced into vein and blood aspirated and guidewire advanced into vein.  Seldinger technique used.  Procedure performed using ultrasound guidance.  Sterile gel and probe cover used in ultrasound-guided central venous catheter insertion.    Intravenous verification was obtained by ultrasound, venous blood return and manometry.      Post insertion care included: all ports aspirated, all ports flushed easily, guidewire removed intact, Biopatch applied, line sutured in place and dressing applied.    During the procedure the patient experienced: patient tolerated procedure well with no complications, arterial puncture and Arterial puncture, confirmed with manometry, pressure applied, no hematoma at site.          Additional notes:  Mini MAC three lumen catheter (7 Fr, 16 cm) introduced under sterile conditions. All ports aspirated and flushed easily.

## 2024-10-18 ENCOUNTER — APPOINTMENT (OUTPATIENT)
Dept: RADIOLOGY | Facility: HOSPITAL | Age: 69
End: 2024-10-18
Payer: MEDICARE

## 2024-10-18 LAB
ALBUMIN SERPL BCP-MCNC: 3.1 G/DL (ref 3.4–5)
ANION GAP BLDA CALCULATED.4IONS-SCNC: 6 MMO/L (ref 10–25)
ANION GAP SERPL CALC-SCNC: 13 MMOL/L (ref 10–20)
APTT PPP: 28 SECONDS (ref 27–38)
BASE EXCESS BLDA CALC-SCNC: 0.8 MMOL/L (ref -2–3)
BASOPHILS # BLD AUTO: 0.05 X10*3/UL (ref 0–0.1)
BASOPHILS NFR BLD AUTO: 0.3 %
BLOOD EXPIRATION DATE: NORMAL
BODY TEMPERATURE: 37 DEGREES CELSIUS
BUN SERPL-MCNC: 24 MG/DL (ref 6–23)
CA-I BLD-SCNC: 1.16 MMOL/L (ref 1.1–1.33)
CA-I BLDA-SCNC: 1.19 MMOL/L (ref 1.1–1.33)
CALCIUM SERPL-MCNC: 8.2 MG/DL (ref 8.6–10.6)
CHLORIDE BLDA-SCNC: 107 MMOL/L (ref 98–107)
CHLORIDE SERPL-SCNC: 106 MMOL/L (ref 98–107)
CO2 SERPL-SCNC: 26 MMOL/L (ref 21–32)
CREAT SERPL-MCNC: 2 MG/DL (ref 0.5–1.3)
DISPENSE STATUS: NORMAL
EGFRCR SERPLBLD CKD-EPI 2021: 36 ML/MIN/1.73M*2
EOSINOPHIL # BLD AUTO: 0.14 X10*3/UL (ref 0–0.7)
EOSINOPHIL NFR BLD AUTO: 0.7 %
ERYTHROCYTE [DISTWIDTH] IN BLOOD BY AUTOMATED COUNT: 17.4 % (ref 11.5–14.5)
GLUCOSE BLD MANUAL STRIP-MCNC: 121 MG/DL (ref 74–99)
GLUCOSE BLD MANUAL STRIP-MCNC: 124 MG/DL (ref 74–99)
GLUCOSE BLD MANUAL STRIP-MCNC: 136 MG/DL (ref 74–99)
GLUCOSE BLD MANUAL STRIP-MCNC: 153 MG/DL (ref 74–99)
GLUCOSE BLDA-MCNC: 116 MG/DL (ref 74–99)
GLUCOSE SERPL-MCNC: 111 MG/DL (ref 74–99)
HCO3 BLDA-SCNC: 26.6 MMOL/L (ref 22–26)
HCT VFR BLD AUTO: 30.9 % (ref 41–52)
HCT VFR BLD EST: 39 % (ref 41–52)
HGB BLD-MCNC: 10.4 G/DL (ref 13.5–17.5)
HGB BLDA-MCNC: 13 G/DL (ref 13.5–17.5)
IMM GRANULOCYTES # BLD AUTO: 0.19 X10*3/UL (ref 0–0.7)
IMM GRANULOCYTES NFR BLD AUTO: 1 % (ref 0–0.9)
INHALED O2 CONCENTRATION: 36 %
INR PPP: 1.1 (ref 0.9–1.1)
LACTATE BLDA-SCNC: 1 MMOL/L (ref 0.4–2)
LYMPHOCYTES # BLD AUTO: 1.11 X10*3/UL (ref 1.2–4.8)
LYMPHOCYTES NFR BLD AUTO: 5.7 %
MAGNESIUM SERPL-MCNC: 1.99 MG/DL (ref 1.6–2.4)
MCH RBC QN AUTO: 31 PG (ref 26–34)
MCHC RBC AUTO-ENTMCNC: 33.7 G/DL (ref 32–36)
MCV RBC AUTO: 92 FL (ref 80–100)
MONOCYTES # BLD AUTO: 3.08 X10*3/UL (ref 0.1–1)
MONOCYTES NFR BLD AUTO: 15.8 %
NEUTROPHILS # BLD AUTO: 14.96 X10*3/UL (ref 1.2–7.7)
NEUTROPHILS NFR BLD AUTO: 76.5 %
NRBC BLD-RTO: 0 /100 WBCS (ref 0–0)
OXYHGB MFR BLDA: 92 % (ref 94–98)
PCO2 BLDA: 46 MM HG (ref 38–42)
PH BLDA: 7.37 PH (ref 7.38–7.42)
PHOSPHATE SERPL-MCNC: 3.7 MG/DL (ref 2.5–4.9)
PLATELET # BLD AUTO: 100 X10*3/UL (ref 150–450)
PO2 BLDA: 68 MM HG (ref 85–95)
POTASSIUM BLDA-SCNC: 4 MMOL/L (ref 3.5–5.3)
POTASSIUM SERPL-SCNC: 3.8 MMOL/L (ref 3.5–5.3)
PRODUCT BLOOD TYPE: 8400
PRODUCT BLOOD TYPE: 9500
PRODUCT CODE: NORMAL
PROTHROMBIN TIME: 12.3 SECONDS (ref 9.8–12.8)
RBC # BLD AUTO: 3.35 X10*6/UL (ref 4.5–5.9)
SAO2 % BLDA: 95 % (ref 94–100)
SODIUM BLDA-SCNC: 136 MMOL/L (ref 136–145)
SODIUM SERPL-SCNC: 141 MMOL/L (ref 136–145)
UNIT ABO: NORMAL
UNIT NUMBER: NORMAL
UNIT RH: NORMAL
UNIT VOLUME: 212
UNIT VOLUME: 279
UNIT VOLUME: 279
UNIT VOLUME: 280
UNIT VOLUME: 287
UNIT VOLUME: 287
UNIT VOLUME: 350
WBC # BLD AUTO: 19.5 X10*3/UL (ref 4.4–11.3)
XM INTEP: NORMAL

## 2024-10-18 PROCEDURE — 82435 ASSAY OF BLOOD CHLORIDE: CPT | Performed by: STUDENT IN AN ORGANIZED HEALTH CARE EDUCATION/TRAINING PROGRAM

## 2024-10-18 PROCEDURE — 82947 ASSAY GLUCOSE BLOOD QUANT: CPT

## 2024-10-18 PROCEDURE — 37799 UNLISTED PX VASCULAR SURGERY: CPT | Performed by: STUDENT IN AN ORGANIZED HEALTH CARE EDUCATION/TRAINING PROGRAM

## 2024-10-18 PROCEDURE — 2500000004 HC RX 250 GENERAL PHARMACY W/ HCPCS (ALT 636 FOR OP/ED): Performed by: NURSE PRACTITIONER

## 2024-10-18 PROCEDURE — 2500000005 HC RX 250 GENERAL PHARMACY W/O HCPCS: Performed by: STUDENT IN AN ORGANIZED HEALTH CARE EDUCATION/TRAINING PROGRAM

## 2024-10-18 PROCEDURE — 84295 ASSAY OF SERUM SODIUM: CPT | Performed by: STUDENT IN AN ORGANIZED HEALTH CARE EDUCATION/TRAINING PROGRAM

## 2024-10-18 PROCEDURE — 1100000001 HC PRIVATE ROOM DAILY

## 2024-10-18 PROCEDURE — 2500000004 HC RX 250 GENERAL PHARMACY W/ HCPCS (ALT 636 FOR OP/ED)

## 2024-10-18 PROCEDURE — 94640 AIRWAY INHALATION TREATMENT: CPT

## 2024-10-18 PROCEDURE — 99232 SBSQ HOSP IP/OBS MODERATE 35: CPT | Performed by: NURSE PRACTITIONER

## 2024-10-18 PROCEDURE — 2500000002 HC RX 250 W HCPCS SELF ADMINISTERED DRUGS (ALT 637 FOR MEDICARE OP, ALT 636 FOR OP/ED)

## 2024-10-18 PROCEDURE — 2500000005 HC RX 250 GENERAL PHARMACY W/O HCPCS

## 2024-10-18 PROCEDURE — 82330 ASSAY OF CALCIUM: CPT | Performed by: STUDENT IN AN ORGANIZED HEALTH CARE EDUCATION/TRAINING PROGRAM

## 2024-10-18 PROCEDURE — 97162 PT EVAL MOD COMPLEX 30 MIN: CPT | Mod: GP

## 2024-10-18 PROCEDURE — 85610 PROTHROMBIN TIME: CPT | Performed by: STUDENT IN AN ORGANIZED HEALTH CARE EDUCATION/TRAINING PROGRAM

## 2024-10-18 PROCEDURE — 83735 ASSAY OF MAGNESIUM: CPT | Performed by: STUDENT IN AN ORGANIZED HEALTH CARE EDUCATION/TRAINING PROGRAM

## 2024-10-18 PROCEDURE — 84132 ASSAY OF SERUM POTASSIUM: CPT | Performed by: STUDENT IN AN ORGANIZED HEALTH CARE EDUCATION/TRAINING PROGRAM

## 2024-10-18 PROCEDURE — 84132 ASSAY OF SERUM POTASSIUM: CPT

## 2024-10-18 PROCEDURE — 2500000002 HC RX 250 W HCPCS SELF ADMINISTERED DRUGS (ALT 637 FOR MEDICARE OP, ALT 636 FOR OP/ED): Performed by: STUDENT IN AN ORGANIZED HEALTH CARE EDUCATION/TRAINING PROGRAM

## 2024-10-18 PROCEDURE — 85018 HEMOGLOBIN: CPT

## 2024-10-18 PROCEDURE — 85025 COMPLETE CBC W/AUTO DIFF WBC: CPT | Performed by: STUDENT IN AN ORGANIZED HEALTH CARE EDUCATION/TRAINING PROGRAM

## 2024-10-18 PROCEDURE — 71045 X-RAY EXAM CHEST 1 VIEW: CPT

## 2024-10-18 PROCEDURE — 2500000004 HC RX 250 GENERAL PHARMACY W/ HCPCS (ALT 636 FOR OP/ED): Performed by: STUDENT IN AN ORGANIZED HEALTH CARE EDUCATION/TRAINING PROGRAM

## 2024-10-18 PROCEDURE — 71045 X-RAY EXAM CHEST 1 VIEW: CPT | Performed by: STUDENT IN AN ORGANIZED HEALTH CARE EDUCATION/TRAINING PROGRAM

## 2024-10-18 PROCEDURE — 80069 RENAL FUNCTION PANEL: CPT

## 2024-10-18 RX ORDER — POTASSIUM CHLORIDE 14.9 MG/ML
20 INJECTION INTRAVENOUS ONCE
Status: COMPLETED | OUTPATIENT
Start: 2024-10-18 | End: 2024-10-18

## 2024-10-18 RX ORDER — SODIUM CHLORIDE, SODIUM LACTATE, POTASSIUM CHLORIDE, CALCIUM CHLORIDE 600; 310; 30; 20 MG/100ML; MG/100ML; MG/100ML; MG/100ML
75 INJECTION, SOLUTION INTRAVENOUS CONTINUOUS
Status: ACTIVE | OUTPATIENT
Start: 2024-10-18 | End: 2024-10-19

## 2024-10-18 RX ADMIN — HEPARIN SODIUM 5000 UNITS: 5000 INJECTION, SOLUTION INTRAVENOUS; SUBCUTANEOUS at 08:20

## 2024-10-18 RX ADMIN — POTASSIUM CHLORIDE 20 MEQ: 14.9 INJECTION, SOLUTION INTRAVENOUS at 04:40

## 2024-10-18 RX ADMIN — HYDROMORPHONE HYDROCHLORIDE: 10 INJECTION, SOLUTION INTRAMUSCULAR; INTRAVENOUS; SUBCUTANEOUS at 01:37

## 2024-10-18 RX ADMIN — Medication 3 L/MIN: at 21:02

## 2024-10-18 RX ADMIN — Medication 4 L/MIN: at 07:00

## 2024-10-18 RX ADMIN — Medication 4 L/MIN: at 08:00

## 2024-10-18 RX ADMIN — PANTOPRAZOLE SODIUM 40 MG: 40 INJECTION, POWDER, LYOPHILIZED, FOR SOLUTION INTRAVENOUS at 08:20

## 2024-10-18 RX ADMIN — IPRATROPIUM BROMIDE AND ALBUTEROL SULFATE 3 ML: .5; 3 SOLUTION RESPIRATORY (INHALATION) at 21:54

## 2024-10-18 RX ADMIN — IPRATROPIUM BROMIDE AND ALBUTEROL SULFATE 3 ML: .5; 3 SOLUTION RESPIRATORY (INHALATION) at 08:41

## 2024-10-18 RX ADMIN — SODIUM CHLORIDE, POTASSIUM CHLORIDE, SODIUM LACTATE AND CALCIUM CHLORIDE 75 ML/HR: 600; 310; 30; 20 INJECTION, SOLUTION INTRAVENOUS at 15:00

## 2024-10-18 RX ADMIN — HYDROMORPHONE HYDROCHLORIDE: 10 INJECTION, SOLUTION INTRAMUSCULAR; INTRAVENOUS; SUBCUTANEOUS at 19:44

## 2024-10-18 RX ADMIN — INSULIN LISPRO 2 UNITS: 100 INJECTION, SOLUTION INTRAVENOUS; SUBCUTANEOUS at 12:01

## 2024-10-18 RX ADMIN — Medication 4 L/MIN: at 08:15

## 2024-10-18 RX ADMIN — HEPARIN SODIUM 5000 UNITS: 5000 INJECTION, SOLUTION INTRAVENOUS; SUBCUTANEOUS at 21:00

## 2024-10-18 ASSESSMENT — PAIN - FUNCTIONAL ASSESSMENT
PAIN_FUNCTIONAL_ASSESSMENT: 0-10

## 2024-10-18 ASSESSMENT — COGNITIVE AND FUNCTIONAL STATUS - GENERAL
STANDING UP FROM CHAIR USING ARMS: TOTAL
DRESSING REGULAR LOWER BODY CLOTHING: A LOT
EATING MEALS: TOTAL
DRESSING REGULAR UPPER BODY CLOTHING: A LOT
DRESSING REGULAR UPPER BODY CLOTHING: A LOT
HELP NEEDED FOR BATHING: A LOT
DAILY ACTIVITIY SCORE: 12
MOBILITY SCORE: 17
HELP NEEDED FOR BATHING: A LOT
EATING MEALS: A LITTLE
MOVING FROM LYING ON BACK TO SITTING ON SIDE OF FLAT BED WITH BEDRAILS: A LOT
MOVING TO AND FROM BED TO CHAIR: TOTAL
WALKING IN HOSPITAL ROOM: A LOT
CLIMB 3 TO 5 STEPS WITH RAILING: TOTAL
TURNING FROM BACK TO SIDE WHILE IN FLAT BAD: A LITTLE
TURNING FROM BACK TO SIDE WHILE IN FLAT BAD: TOTAL
MOBILITY SCORE: 8
MOVING TO AND FROM BED TO CHAIR: A LITTLE
CLIMB 3 TO 5 STEPS WITH RAILING: A LOT
STANDING UP FROM CHAIR USING ARMS: TOTAL
DAILY ACTIVITIY SCORE: 11
MOVING FROM LYING ON BACK TO SITTING ON SIDE OF FLAT BED WITH BEDRAILS: A LOT
WALKING IN HOSPITAL ROOM: A LITTLE
TURNING FROM BACK TO SIDE WHILE IN FLAT BAD: A LOT
MOBILITY SCORE: 9
DRESSING REGULAR LOWER BODY CLOTHING: A LITTLE
TOILETING: TOTAL
WALKING IN HOSPITAL ROOM: TOTAL
STANDING UP FROM CHAIR USING ARMS: A LITTLE
CLIMB 3 TO 5 STEPS WITH RAILING: TOTAL
TOILETING: TOTAL
PERSONAL GROOMING: A LOT
MOVING FROM LYING ON BACK TO SITTING ON SIDE OF FLAT BED WITH BEDRAILS: A LITTLE
MOVING TO AND FROM BED TO CHAIR: A LOT
PERSONAL GROOMING: A LOT

## 2024-10-18 ASSESSMENT — PAIN SCALES - GENERAL
PAINLEVEL_OUTOF10: 4
PAINLEVEL_OUTOF10: 2
PAINLEVEL_OUTOF10: 4
PAINLEVEL_OUTOF10: 3
PAINLEVEL_OUTOF10: 2

## 2024-10-18 ASSESSMENT — ACTIVITIES OF DAILY LIVING (ADL): ADL_ASSISTANCE: INDEPENDENT

## 2024-10-18 ASSESSMENT — PAIN DESCRIPTION - DESCRIPTORS: DESCRIPTORS: DISCOMFORT;ACHING

## 2024-10-18 NOTE — CARE PLAN
Problem: Skin  Goal: Participates in plan/prevention/treatment measures  Outcome: Progressing  Flowsheets (Taken 10/17/2024 0817 by Ani Costa RN)  Participates in plan/prevention/treatment measures: Elevate heels  Goal: Promote skin healing  Outcome: Progressing  Flowsheets (Taken 10/17/2024 0817 by Ani Costa RN)  Promote skin healing:   Assess skin/pad under line(s)/device(s)   Protective dressings over bony prominences   Turn/reposition every 2 hours/use positioning/transfer devices   Rotate device position/do not position patient on device

## 2024-10-18 NOTE — CARE PLAN
The patient's goals for the shift include      The clinical goals for the shift include pt will remain HDS stable throughout shift.      Problem: Skin  Goal: Participates in plan/prevention/treatment measures  Outcome: Progressing  Goal: Promote skin healing  Outcome: Progressing     Problem: Pain - Adult  Goal: Verbalizes/displays adequate comfort level or baseline comfort level  Outcome: Progressing     Problem: Safety - Adult  Goal: Free from fall injury  Outcome: Progressing     Problem: Discharge Planning  Goal: Discharge to home or other facility with appropriate resources  Outcome: Progressing     Problem: Chronic Conditions and Co-morbidities  Goal: Patient's chronic conditions and co-morbidity symptoms are monitored and maintained or improved  Outcome: Progressing     Problem: Safety - Medical Restraint  Goal: Remains free of injury from restraints (Restraint for Interference with Medical Device)  Outcome: Progressing

## 2024-10-18 NOTE — PROGRESS NOTES
[facility]  TRAUMA ICU - PROGRESS NOTE    Patient Name: Facundo Youngblood  MRN: [id number]  Admit Date: 1016  : 1955  AGE: 69 y.o.   GENDER: male  ==============================================================================    69 y.o. male who presented as transfer from OSH for spontaneous splenic rupture. At OSH ED was noted to be hypotensive and 2u pRBC transfused. In [facility] ED MTP was activated and he received 7 units pRBC and 5 FFP and was intubated before proceeding to OR for exploratory laparotomy and splenectomy revealing a ruptured spleen. EBL noted to be 1.5L. In the OR he received 1L crystalloid, 500cc 5% albumin, 3 pRBC, 3 FFP, 1pk plt, TXA. Bronchoscopy performed for mouth foaming/thick secretions suctioned. A drain was left in the surgical bed. UOP was 500cc in the case. He presented to the ICU intubated and sedated on no vasoactive medications. He is in the ICU due to: need for hemodynamic monitoring, resuscitation, acute hypoxic respiratory failure.      Neuro:  #Acute pain secondary to surgery.  Dilaudid pca. Transition to oral been regimen when tolerating diet.      CV: #History of hypertension, hyperlipidemia.  Patient takes losartan 100 mg daily, amlodipine 10 mg daily, and chlorthalidone 25 mg daily.  Will continue to hold these medications, started clear diet today, if tolerates will resume as appropriate.  hydralazine 10 mg IV every 6 as needed for systolic blood pressure greater than 160.  Goal MAP greater than 65.       Pulm: #History of emphysema.  Extubated 10/17, currently on 4L nc. When wean to 3L  and moving around drops to low 80s. Maintain goal SBP O2 greater than 88%.  DuoNebs as needed. incentive spirometry every 1 hour.  Out of bed today.      GI: #Spontaneous splenic rupture status/post splenectomy.  Splenectomy vaccines at discharge. NG to low intermittent wall suction, removed today. Started on clear liquid diet.  Bulb drain in place  in splenic bed.  Will continue to monitor outputs and consistency.  Prevena wound VAC overlying midline incision.     : #SYLVIA on CKD.  #History of CKD 3B with baseline creatinine of 1.8-1.9; #history of renal artery stenosis status post stents.  MIVF IVF as clear liquid diet started, stop when tolerating diet. monitor electrolytes and replace as clinically indicated. Discontinue ko today.      Heme:   #Acute blood loss anemia.  Patient did receive massive transfusion protocol.  Currently hemoglobin is 10.4 from 10.9 yesterday. Trend CBC as appropriate. monitor for clinical signs of anemia and will transfuse as appropriate.  Postop TEG was normal.     #Thrombocytopenia. Improving, platelets 100 today.      Endo: No active issues.  Blood glucose 120-136 the past 24 hours. No coverage required.     MSK/Skin: #At risk for skin breakdown.  Continue ICU skin protocol including assisting with every 2 hours turns, encouraging out of bed, Mepilex to bony prominences.     ID: #Leukocytosis.  Likely secondary to surgical intervention.  Will continue to monitor.  Patient has been afebrile.  No need for antibiotics at this time.     PPX: Hep subcutaneous, SCDs     Lines: Left subclavian, radial art line, Ko, NG, wound VAC. Discontinued NG, Central line, and arterial line today.      Dispo: Transfer to floor.     Pt. Seen and discussed with Dr. Angulo.     [facility]  Trauma Surgery  [id number]    Total face to face time spent with patient/family of 30 minutes, with >50% of the time spent discussing plan of care/management, counseling/educating on disease processes, explaining results of diagnostic testing.         ==============================================================================  CHIEF COMPLAINT / OVERNIGHT EVENTS / HPI:   No acute events overnight.     MEDICAL HISTORY / ROS:  Admission history and ROS reviewed. Pertinent changes as follows:  Complaint of abd pain with movement. Pain controlled  with dilaudid pca. Pt. Denies headache, dizziness, chest pain, or sob. No flatus as of this am, intermittent belching .     PHYSICAL EXAM:  Heart Rate:  []   Temp:  [36.2 °C (97.2 °F)-37.3 °C (99.1 °F)]   Resp:  [8-26]   BP: (132-178)/(60-91)   SpO2:  [88 %-96 %]   Physical Exam  Vitals reviewed.   Constitutional:       Comments: AOx3   HENT:      Head: Normocephalic and atraumatic.      Right Ear: External ear normal.      Left Ear: External ear normal.      Nose: Nose normal.      Comments: NG in nare this am     Mouth/Throat:      Mouth: Mucous membranes are dry.      Pharynx: Oropharynx is clear.   Eyes:      Pupils: Pupils are equal, round, and reactive to light.   Cardiovascular:      Rate and Rhythm: Normal rate.      Pulses: Normal pulses.   Pulmonary:      Comments: On 4L nc, CTAB, respirations even and unlabored, thorax symmetric   Abdominal:      Tenderness: There is abdominal tenderness.      Comments: Mildly firm, prevena vac in place    Genitourinary:     Comments: Correa in place draining clear yellow urine   Musculoskeletal:      Cervical back: Neck supple.      Right lower leg: No edema.      Left lower leg: No edema.   Skin:     General: Skin is warm.      Capillary Refill: Capillary refill takes less than 2 seconds.   Neurological:      Mental Status: He is alert and oriented to person, place, and time.   Psychiatric:         Mood and Affect: Mood normal.         Behavior: Behavior normal.           LABS:  Results from last 7 days   Lab Units 10/18/24  0204 10/17/24  1154 10/17/24  0451   WBC AUTO x10*3/uL 19.5* 16.9* 21.5*   HEMOGLOBIN g/dL 10.4* 10.9* 10.9*   HEMATOCRIT % 30.9* 31.9* 31.7*   PLATELETS AUTO x10*3/uL 100* 94* 86*   NEUTROS PCT AUTO % 76.5 78.2 83.1   LYMPHS PCT AUTO % 5.7 4.3 5.2   MONOS PCT AUTO % 15.8 16.5 11.0   EOS PCT AUTO % 0.7 0.1 0.0     Results from last 7 days   Lab Units 10/18/24  0204 10/17/24  1154 10/16/24  2236   APTT seconds 28 28 31   INR  1.1 1.1 1.1      Results from last 7 days   Lab Units 10/18/24  0204 10/17/24  1154 10/17/24  0451 10/16/24  2236 10/16/24  1447   SODIUM mmol/L 141 141 141   < > 136   POTASSIUM mmol/L 3.8 3.7 4.1   < > 3.2*   CHLORIDE mmol/L 106 108* 108*   < > 103   CO2 mmol/L 26 24 25   < > 21   BUN mg/dL 24* 28* 28*   < > 29*   CREATININE mg/dL 2.00* 2.04* 2.03*   < > 2.09*   CALCIUM mg/dL 8.2* 8.0* 8.2*   < > 8.4*   PROTEIN TOTAL g/dL  --   --   --   --  6.9   BILIRUBIN TOTAL mg/dL  --   --   --   --  0.5   ALK PHOS U/L  --   --   --   --  51   ALT U/L  --   --   --   --  7*   AST U/L  --   --   --   --  11   GLUCOSE mg/dL 111* 116* 115*   < > 105*    < > = values in this interval not displayed.     Results from last 7 days   Lab Units 10/16/24  1447   BILIRUBIN TOTAL mg/dL 0.5     Results from last 7 days   Lab Units 10/18/24  0156 10/17/24  0451 10/17/24  0134   POCT PH, ARTERIAL pH 7.37* 7.40 7.37*   POCT PCO2, ARTERIAL mm Hg 46* 40 40   POCT PO2, ARTERIAL mm Hg 68* 108* 138*   POCT HCO3 CALCULATED, ARTERIAL mmol/L 26.6* 24.8 23.1   POCT BASE EXCESS, ARTERIAL mmol/L 0.8 0.0 -2.0     I have reviewed all medications, laboratory results, and imaging pertinent for today's encounter.

## 2024-10-18 NOTE — PROGRESS NOTES
[facility]  ACUTE CARE SURGERY - HISTORY AND PHYSICAL / CONSULT    Patient Name: Facundo Youngblood  MRN: [id number]  Admit Date: 1016  : 1955  AGE: 69 y.o.   GENDER: male  ==============================================================================  TODAY'S ASSESSMENT AND PLAN OF CARE:  70 y/o M with PMHx significant for HTN transferred from [facility] for spontaneous splenic rupture, now status-post ex-lap for splenectomy. Patient with significant clinical improvement since yesterday.     Recommendations:  - Recommend ongoing trending of CBC  - Continued resuscitation per ICU  - Patient a likey candidate for transfer to the floor  - Okay to remove NGT and start a clear liquid diet.   - When on the floor, patient will require splenectomy vaccines.    Plans discussed with Dr. Chamorro, attending  Kedar Colunga MD  PGY-1  P. [id number]    ==============================================================================  CHIEF COMPLAINT/REASON FOR CONSULT:  No new updates    PAST MEDICAL HISTORY:   PMH: HTN    PSH: None  Past Surgical History:   Procedure Laterality Date    CT ABDOMEN PELVIS ANGIOGRAM W AND/OR WO IV CONTRAST  2022    CT ABDOMEN PELVIS ANGIOGRAM W AND/OR WO IV CONTRAST [facility]    CT ABDOMEN PELVIS ANGIOGRAM W AND/OR WO IV CONTRAST  3/10/2023    CT ABDOMEN PELVIS ANGIOGRAM W AND/OR WO IV CONTRAST [facility]     FH:   Family History   Problem Relation Name Age of Onset    Cancer Mother      Diabetes Mother       SOCIAL HISTORY:    Smoking:    Social History     Tobacco Use   Smoking Status Every Day    Types: Cigars   Smokeless Tobacco Never   Tobacco Comments    cigars       Alcohol:    Social History     Substance and Sexual Activity   Alcohol Use Yes       Drug use:     MEDICATIONS:   Prior to Admission medications    Medication Sig Start Date End Date Taking? Authorizing Provider   albuterol (Proventil HFA) 90 mcg/actuation  inhaler Inhale 2 puffs 4 times a day. 2/25/22   Historical Provider, MD   amLODIPine (Norvasc) 10 mg tablet TAKE 1 TABLET(10 MG) BY MOUTH DAILY 6/3/24   Isabel Chiu MD   atorvastatin (Lipitor) 20 mg tablet TAKE 1 TABLET BY MOUTH DAILY 6/3/24   Isabel Chiu MD   chlorthalidone (Hygroton) 25 mg tablet TAKE 1 TABLET BY MOUTH DAILY 6/10/24   Isabel Chiu MD   Farxiga 10 mg Take 1 tablet (10 mg) by mouth once daily. 10/14/24 10/14/25  Gely Wilcox MD   losartan (Cozaar) 100 mg tablet TAKE 1 TABLET BY MOUTH DAILY 6/3/24   Isabel Chiu MD   Farxiga 10 mg Take 1 tablet (10 mg) by mouth once daily. 5/8/24 10/14/24  Gely Wilcox MD     ALLERGIES:   Allergies   Allergen Reactions    Penicillins Swelling and Other    Ibuprofen GI Upset       REVIEW OF SYSTEMS:  Unable to be obtained at this time.   PHYSICAL EXAM:  GEN: A&Ox3, well-appearing.   HEENT: Sclera anicteric. Moist mucous membranes.  RESP: Breathing non-labored, equal chest rise.  CV: Regular rate, normotensive  GI: Abdomen soft, nondistended, unable to elicit pain on palpation. Midline prevena in place with suction. L BECKY with serosanguinous output.  : Correa in place with yellow urine.  MSK: No gross deformities. Moves all extremities spontaneously.  SKIN: No rashes or lesions.  IMAGING SUMMARY:  (summary of findings, not a copy of dictation)  No new imaging    LABS:  Results from last 7 days   Lab Units 10/18/24  0204 10/17/24  1154 10/17/24  0451   WBC AUTO x10*3/uL 19.5* 16.9* 21.5*   HEMOGLOBIN g/dL 10.4* 10.9* 10.9*   HEMATOCRIT % 30.9* 31.9* 31.7*   PLATELETS AUTO x10*3/uL 100* 94* 86*   NEUTROS PCT AUTO % 76.5 78.2 83.1   LYMPHS PCT AUTO % 5.7 4.3 5.2   MONOS PCT AUTO % 15.8 16.5 11.0   EOS PCT AUTO % 0.7 0.1 0.0     Results from last 7 days   Lab Units 10/18/24  0204 10/17/24  1154 10/16/24  2236   APTT seconds 28 28 31   INR  1.1 1.1 1.1     Results from last 7 days   Lab Units 10/18/24  0204 10/17/24  1154 10/17/24  0451 10/16/24  2236  10/16/24  1447   SODIUM mmol/L 141 141 141   < > 136   POTASSIUM mmol/L 3.8 3.7 4.1   < > 3.2*   CHLORIDE mmol/L 106 108* 108*   < > 103   CO2 mmol/L 26 24 25   < > 21   BUN mg/dL 24* 28* 28*   < > 29*   CREATININE mg/dL 2.00* 2.04* 2.03*   < > 2.09*   CALCIUM mg/dL 8.2* 8.0* 8.2*   < > 8.4*   PROTEIN TOTAL g/dL  --   --   --   --  6.9   BILIRUBIN TOTAL mg/dL  --   --   --   --  0.5   ALK PHOS U/L  --   --   --   --  51   ALT U/L  --   --   --   --  7*   AST U/L  --   --   --   --  11   GLUCOSE mg/dL 111* 116* 115*   < > 105*    < > = values in this interval not displayed.     Results from last 7 days   Lab Units 10/16/24  1447   BILIRUBIN TOTAL mg/dL 0.5     Results from last 7 days   Lab Units 10/18/24  0156 10/17/24  0451 10/17/24  0134   POCT PH, ARTERIAL pH 7.37* 7.40 7.37*   POCT PCO2, ARTERIAL mm Hg 46* 40 40   POCT PO2, ARTERIAL mm Hg 68* 108* 138*   POCT HCO3 CALCULATED, ARTERIAL mmol/L 26.6* 24.8 23.1   POCT BASE EXCESS, ARTERIAL mmol/L 0.8 0.0 -2.0         I have reviewed all laboratory and imaging results ordered/pertinent for this encounter.

## 2024-10-18 NOTE — HOSPITAL COURSE
69-year-old male who initially presented to outside hospital with complaints of generalized weakness and dizziness.  For the previous 4 days prior to presentation patient had been having flulike symptoms, vomiting diarrhea hot and cold sweats.  He was starting to feel better however he developed sharp pain in his left side necessitating an emergency department visit.  He was scanned and found to have spontaneous splenic rupture.  Patient received blood transfusions and was transferred to [facility].  Patient was taken emergently to the operating room where he underwent splenectomy on 10/16.  Patient admitted to the ICU postoperatively for hemodynamic monitoring, resuscitation and hypoxic respiratory failure. He was able to be extubated on POD #1 and transferred to floor on POD#2. Patient remained bowel rest due to expected postop ileus. With return of bowel function his diet was slowly advanced for which he tolerated well. His prevena wound vac was removed on POD #6 and incision remained clean and dry. BECKY drain removed prior to discharge.   Patient was provided with following splenectomy vaccines prior to discharge:  Haemophilus B conjugate  Meningococcal B vaccines (Bexsero)  Pneumococcal (Prevenar 20)    He will need another Bexsero injection approximately 8 weeks after first dose and then 1 year later.     On the day of discharge, patient remained hemodynamically stable, tolerating a diet, pain controlled and having bowel function. He was provided with detailed discharge instructions and given a short course of pain medication. He is to have close follow up in [facility] clinic.

## 2024-10-18 NOTE — CARE PLAN
The patient's goals for the shift include      The clinical goals for the shift include pt will remain HDS stable throughout shift.      Problem: Skin  Goal: Participates in plan/prevention/treatment measures  10/18/2024 1819 by Rita Dumont RN  Outcome: Progressing  10/18/2024 1817 by Rita Dumont RN  Outcome: Progressing  Goal: Promote skin healing  10/18/2024 1819 by Rita Dumont RN  Outcome: Progressing  10/18/2024 1817 by Rita Dumont RN  Outcome: Progressing  Goal: Decreased wound size/increased tissue granulation at next dressing change  Outcome: Progressing  Goal: Prevent/manage excess moisture  Outcome: Progressing  Goal: Prevent/minimize sheer/friction injuries  Outcome: Progressing  Goal: Promote/optimize nutrition  Outcome: Progressing     Problem: Pain - Adult  Goal: Verbalizes/displays adequate comfort level or baseline comfort level  10/18/2024 1819 by Rita Dumont RN  Outcome: Progressing  10/18/2024 1817 by Rita Dumont RN  Outcome: Progressing     Problem: Safety - Adult  Goal: Free from fall injury  10/18/2024 1819 by Rita Dumont RN  Outcome: Progressing  10/18/2024 1817 by Rita Dumont RN  Outcome: Progressing     Problem: Discharge Planning  Goal: Discharge to home or other facility with appropriate resources  10/18/2024 1819 by Rita Dumont RN  Outcome: Progressing  10/18/2024 1817 by Rita Dumont RN  Outcome: Progressing     Problem: Chronic Conditions and Co-morbidities  Goal: Patient's chronic conditions and co-morbidity symptoms are monitored and maintained or improved  10/18/2024 1819 by Rita Dumont RN  Outcome: Progressing  10/18/2024 1817 by Rita Dumont RN  Outcome: Progressing     Problem: Safety - Medical Restraint  Goal: Remains free of injury from restraints (Restraint for Interference with Medical Device)  10/18/2024 1819 by Rita Dumont RN  Outcome: Progressing  10/18/2024 1817 by Rita Dumont RN  Outcome:  Progressing

## 2024-10-18 NOTE — PROGRESS NOTES
Physical Therapy    Physical Therapy Evaluation    Patient Name: Facundo Youngblood  MRN: [id number]  Department: [facility] TSICU  Room: 07/07-A  Today's Date: 10/18/2024   Time Calculation  Start Time: 0855  Stop Time: 0921  Time Calculation (min): 26 min    Assessment/Plan   PT Assessment  PT Assessment Results: Decreased endurance, Impaired balance, Decreased mobility, Pain  Rehab Prognosis: Excellent  Barriers to Discharge: pain control  Evaluation/Treatment Tolerance: Patient limited by pain  Medical Staff Made Aware: Yes  Strengths: Premorbid level of function  Barriers to Participation: Comorbidities  End of Session Communication: Bedside nurse  Assessment Comment: Pt primarily limited by abdominal pain during functional mobility assessment. Pt noted to have tremors/shaking and hiccups t/o session, RN aware. Pt required CGA-SBA for bed mobility, transfers and short distance ambulation to chair. Pt will continue to benefit from skilled PT and anticipate pt to D/C home with improved pain control.  End of Session Patient Position: Up in chair, Alarm off, not on at start of session  IP OR SWING BED PT PLAN  Inpatient or Swing Bed: Inpatient  PT Plan  Treatment/Interventions: Bed mobility, Transfer training, Gait training, Stair training, Balance training, Strengthening, Endurance training, Therapeutic exercise, Therapeutic activity, Positioning  PT Plan: Ongoing PT  PT Frequency: 5 times per week  PT Discharge Recommendations: Low intensity level of continued care (HHC)  Equipment Recommended upon Discharge:  (TBD)  PT Recommended Transfer Status: Assist x1, Assistive device  PT - OK to Discharge: Yes    Subjective   General Visit Information:  General  Reason for Referral: transfer from OSH for spontaneous splenic rupture. At OSH ED was noted to be hypotensive and 2u pRBC transfused. In [facility] ED MTP was activated and he received 7 units pRBC and 5 FFP and was intubated before proceeding to OR for exploratory laparotomy  and splenectomy revealing a ruptured spleen  Past Medical History Relevant to Rehab: hypertension, hyperlipidemia, emphysema, CKD 3B  Prior to Session Communication: Bedside nurse  Patient Position Received: Bed, 3 rail up, Alarm off, not on at start of session  General Comment: Pt supine in bed, argreeable to PT (noemi sandra, 1 BECKY drain, ko, NGT to LIWS, PCA pump)  Home Living:  Home Living  Type of Home: House  Lives With: Alone  Home Adaptive Equipment: Cane  Home Layout: One level  Home Access: Stairs to enter without rails  Entrance Stairs-Number of Steps: 1  Bathroom Shower/Tub: Tub/shower unit  Prior Level of Function:  Prior Function Per Pt/Caregiver Report  Level of [place]: Independent with ADLs and functional transfers  Receives Help From:  (dtr lives nearby)  ADL Assistance: Independent  Homemaking Assistance: Independent  Ambulatory Assistance: Independent  Vocational: Retired  Leisure: managing rental property  Prior Function Comments: (+) drives  Precautions:  Precautions  Hearing/Visual Limitations: WFL  Medical Precautions: Abdominal precautions  Post-Surgical Precautions: Abdominal surgery precautions  Precautions Comment: MAP >65     Vital Signs (Past 2hrs)        Date/Time Vitals Session Patient Position Pulse Resp SpO2 BP MAP (mmHg)    10/18/24 0841 --  --  92  19  91 %  --  --     10/18/24 0855 Pre PT  --  95  22  90 %  171/75  102     10/18/24 0900 --  --  90  15  90 %  169/72  99     10/18/24 0915 --  --  111  17  91 %  178/73  102     10/18/24 0921 Post PT  --  112  26  88 %  178/73  102     10/18/24 0926 --  --  100  19  90 %  163/76  100     10/18/24 1000 --  --  96  14  91 %  156/75  96                         Objective   Pain:  Pain Assessment  Pain Assessment: 0-10  0-10 (Numeric) Pain Score: 4  Pain Type: Acute pain  Pain Location: Abdomen  Pain Orientation: Mid  Pain Interventions: Repositioned  Cognition:  Cognition  Overall Cognitive Status: Within Functional  Limits  Orientation Level: Oriented X4    General Assessments:                  Activity Tolerance  Endurance: Tolerates 10 - 20 min exercise with multiple rests  Early Mobility/Exercise Safety Screen: Proceed with mobilization - No exclusion criteria met    Sensation  Light Touch: No apparent deficits    Strength  Strength Comments: FERDINAND WFL  Strength  Strength Comments: FERDINAND L           Coordination  Movements are Fluid and Coordinated: Yes    Postural Control  Postural Control: Within Functional Limits    Static Sitting Balance  Static Sitting-Balance Support: Feet supported  Static Sitting-Level of Assistance: Close supervision  Static Sitting-Comment/Number of Minutes: 5 minutes    Static Standing Balance  Static Standing-Balance Support: Bilateral upper extremity supported  Static Standing-Level of Assistance: Close supervision  Static Standing-Comment/Number of Minutes: 1 minute  Functional Assessments:  Bed Mobility  Bed Mobility: Yes  Bed Mobility 1  Bed Mobility 1: Supine to sitting  Level of Assistance 1: Contact guard  Bed Mobility Comments 1: HOB elevated, use of bedrails    Transfers  Transfer: Yes  Transfer 1  Transfer From 1: Bed to, Stand to  Transfer to 1: Stand, Chair with arms  Technique 1: Sit to stand  Transfer Device 1: Walker  Transfer Level of Assistance 1: Contact guard  Trials/Comments 1: x1 trial, cues for hand placement    Ambulation/Gait Training  Ambulation/Gait Training Performed: Yes  Ambulation/Gait Training 1  Surface 1: Level tile  Device 1: Rolling walker  Assistance 1: Close supervision  Quality of Gait 1: Antalgic  Comments/Distance (ft) 1: 5ft from bed to chair    Stairs  Stairs: No  Extremity/Trunk Assessments:  RLE   RLE : Within Functional Limits  LLE   LLE : Within Functional Limits  Outcome Measures:  [facility] Basic Mobility  Turning from your back to your side while in a flat bed without using bedrails: A little  Moving from lying on your back to sitting on the side of a  flat bed without using bedrails: A little  Moving to and from bed to chair (including a wheelchair): A little  Standing up from a chair using your arms (e.g. wheelchair or bedside chair): A little  To walk in hospital room: A little  Climbing 3-5 steps with railing: A lot  Basic Mobility - Total Score: 17    FSS-ICU  Ambulation: Walks <50 feet with any assistance x1 or walks any distance with assistance x2 people  Rolling: Supervision or set-up only  Sitting: Supervision or set-up only  Transfer Sit-to-Stand: Supervision or set-up only  Transfer Supine-to-Sit: Supervision or set-up only  Total Score: 21      Early Mobility/Exercise Safety Screen: Proceed with mobilization - No exclusion criteria met  ICU Mobility Scale: Transferring bed to chair [5]    Encounter Problems       Encounter Problems (Active)       Balance       Pt will maintain dynamic standing balance without UE support during functional activities with SBA        Start:  10/18/24    Expected End:  11/01/24               Mobility       STG - Patient will ambulate >100ft using LRAD Minnie        Start:  10/18/24    Expected End:  11/01/24            STG - Patient will ambulate up and down a curb/step with LRAD Minnie        Start:  10/18/24    Expected End:  11/01/24               PT Transfers       Pt will complete all functional transfers using LRAD Minnie        Start:  10/18/24    Expected End:  11/01/24               Pain - Adult              Education Documentation  Precautions, taught by Tracie Herrera PT at 10/18/2024 10:36 AM.  Learner: Patient  Readiness: Acceptance  Method: Explanation  Response: Verbalizes Understanding    Body Mechanics, taught by Tracie Herrera PT at 10/18/2024 10:36 AM.  Learner: Patient  Readiness: Acceptance  Method: Explanation  Response: Verbalizes Understanding    Mobility Training, taught by Tracie Herrera PT at 10/18/2024 10:36 AM.  Learner: Patient  Readiness: Acceptance  Method: Explanation  Response: Verbalizes  Understanding    Education Comments  No comments found.      Tracie Herrera, PT

## 2024-10-18 NOTE — ED PROCEDURE NOTE
Procedure  Critical Care    Performed by: Lul Barragan MD  Authorized by: Lul Barragan MD    Critical care provider statement:     Critical care time (minutes):  31    Critical care time was exclusive of:  Separately billable procedures and treating other patients and teaching time    Critical care was necessary to treat or prevent imminent or life-threatening deterioration of the following conditions:  Respiratory failure, circulatory failure and shock    Critical care was time spent personally by me on the following activities:  Ordering and performing treatments and interventions, development of treatment plan with patient or surrogate, ordering and review of laboratory studies, discussions with consultants, ordering and review of radiographic studies, discussions with primary provider, re-evaluation of patient's condition, examination of patient and review of old charts    Care discussed with: admitting provider         Lul Barragan MD  10/18/24 0456

## 2024-10-19 ENCOUNTER — APPOINTMENT (OUTPATIENT)
Dept: RADIOLOGY | Facility: HOSPITAL | Age: 69
End: 2024-10-19
Payer: MEDICARE

## 2024-10-19 LAB
ALBUMIN SERPL BCP-MCNC: 3.3 G/DL (ref 3.4–5)
ANION GAP SERPL CALC-SCNC: 13 MMOL/L (ref 10–20)
BUN SERPL-MCNC: 25 MG/DL (ref 6–23)
CALCIUM SERPL-MCNC: 8.8 MG/DL (ref 8.6–10.6)
CHLORIDE SERPL-SCNC: 101 MMOL/L (ref 98–107)
CO2 SERPL-SCNC: 28 MMOL/L (ref 21–32)
CREAT SERPL-MCNC: 1.74 MG/DL (ref 0.5–1.3)
EGFRCR SERPLBLD CKD-EPI 2021: 42 ML/MIN/1.73M*2
ERYTHROCYTE [DISTWIDTH] IN BLOOD BY AUTOMATED COUNT: 16.1 % (ref 11.5–14.5)
GLUCOSE BLD MANUAL STRIP-MCNC: 108 MG/DL (ref 74–99)
GLUCOSE BLD MANUAL STRIP-MCNC: 109 MG/DL (ref 74–99)
GLUCOSE BLD MANUAL STRIP-MCNC: 109 MG/DL (ref 74–99)
GLUCOSE BLD MANUAL STRIP-MCNC: 112 MG/DL (ref 74–99)
GLUCOSE BLD MANUAL STRIP-MCNC: 112 MG/DL (ref 74–99)
GLUCOSE BLD MANUAL STRIP-MCNC: 99 MG/DL (ref 74–99)
GLUCOSE SERPL-MCNC: 93 MG/DL (ref 74–99)
HCT VFR BLD AUTO: 29.1 % (ref 41–52)
HGB BLD-MCNC: 9.8 G/DL (ref 13.5–17.5)
MAGNESIUM SERPL-MCNC: 1.79 MG/DL (ref 1.6–2.4)
MCH RBC QN AUTO: 31.7 PG (ref 26–34)
MCHC RBC AUTO-ENTMCNC: 33.7 G/DL (ref 32–36)
MCV RBC AUTO: 94 FL (ref 80–100)
NRBC BLD-RTO: 0 /100 WBCS (ref 0–0)
PHOSPHATE SERPL-MCNC: 1.8 MG/DL (ref 2.5–4.9)
PLATELET # BLD AUTO: 158 X10*3/UL (ref 150–450)
POTASSIUM SERPL-SCNC: 3.7 MMOL/L (ref 3.5–5.3)
RBC # BLD AUTO: 3.09 X10*6/UL (ref 4.5–5.9)
SODIUM SERPL-SCNC: 138 MMOL/L (ref 136–145)
WBC # BLD AUTO: 18.1 X10*3/UL (ref 4.4–11.3)

## 2024-10-19 PROCEDURE — 82947 ASSAY GLUCOSE BLOOD QUANT: CPT

## 2024-10-19 PROCEDURE — 1100000001 HC PRIVATE ROOM DAILY

## 2024-10-19 PROCEDURE — 2500000004 HC RX 250 GENERAL PHARMACY W/ HCPCS (ALT 636 FOR OP/ED)

## 2024-10-19 PROCEDURE — 2500000005 HC RX 250 GENERAL PHARMACY W/O HCPCS

## 2024-10-19 PROCEDURE — 83735 ASSAY OF MAGNESIUM: CPT

## 2024-10-19 PROCEDURE — 94640 AIRWAY INHALATION TREATMENT: CPT

## 2024-10-19 PROCEDURE — 85027 COMPLETE CBC AUTOMATED: CPT

## 2024-10-19 PROCEDURE — 36415 COLL VENOUS BLD VENIPUNCTURE: CPT

## 2024-10-19 PROCEDURE — 2500000001 HC RX 250 WO HCPCS SELF ADMINISTERED DRUGS (ALT 637 FOR MEDICARE OP)

## 2024-10-19 PROCEDURE — 80069 RENAL FUNCTION PANEL: CPT

## 2024-10-19 PROCEDURE — 2500000002 HC RX 250 W HCPCS SELF ADMINISTERED DRUGS (ALT 637 FOR MEDICARE OP, ALT 636 FOR OP/ED)

## 2024-10-19 PROCEDURE — 71045 X-RAY EXAM CHEST 1 VIEW: CPT

## 2024-10-19 PROCEDURE — 71045 X-RAY EXAM CHEST 1 VIEW: CPT | Performed by: RADIOLOGY

## 2024-10-19 RX ORDER — HYDRALAZINE HYDROCHLORIDE 20 MG/ML
10 INJECTION INTRAMUSCULAR; INTRAVENOUS EVERY 6 HOURS PRN
Status: ACTIVE | OUTPATIENT
Start: 2024-10-19

## 2024-10-19 RX ORDER — SODIUM CHLORIDE, SODIUM LACTATE, POTASSIUM CHLORIDE, CALCIUM CHLORIDE 600; 310; 30; 20 MG/100ML; MG/100ML; MG/100ML; MG/100ML
25 INJECTION, SOLUTION INTRAVENOUS CONTINUOUS
Status: ACTIVE | OUTPATIENT
Start: 2024-10-19 | End: 2024-10-23

## 2024-10-19 RX ADMIN — HEPARIN SODIUM 5000 UNITS: 5000 INJECTION, SOLUTION INTRAVENOUS; SUBCUTANEOUS at 09:43

## 2024-10-19 RX ADMIN — IPRATROPIUM BROMIDE AND ALBUTEROL SULFATE 3 ML: .5; 3 SOLUTION RESPIRATORY (INHALATION) at 09:21

## 2024-10-19 RX ADMIN — HEPARIN SODIUM 5000 UNITS: 5000 INJECTION, SOLUTION INTRAVENOUS; SUBCUTANEOUS at 20:48

## 2024-10-19 RX ADMIN — POLYETHYLENE GLYCOL 3350 17 G: 17 POWDER, FOR SOLUTION ORAL at 09:43

## 2024-10-19 RX ADMIN — HEPARIN SODIUM 5000 UNITS: 5000 INJECTION, SOLUTION INTRAVENOUS; SUBCUTANEOUS at 15:08

## 2024-10-19 RX ADMIN — Medication 2 L/MIN: at 20:45

## 2024-10-19 RX ADMIN — IPRATROPIUM BROMIDE AND ALBUTEROL SULFATE 3 ML: .5; 3 SOLUTION RESPIRATORY (INHALATION) at 14:53

## 2024-10-19 RX ADMIN — SODIUM CHLORIDE, POTASSIUM CHLORIDE, SODIUM LACTATE AND CALCIUM CHLORIDE 75 ML/HR: 600; 310; 30; 20 INJECTION, SOLUTION INTRAVENOUS at 13:21

## 2024-10-19 RX ADMIN — PANTOPRAZOLE SODIUM 40 MG: 40 TABLET, DELAYED RELEASE ORAL at 06:48

## 2024-10-19 RX ADMIN — IPRATROPIUM BROMIDE AND ALBUTEROL SULFATE 3 ML: .5; 3 SOLUTION RESPIRATORY (INHALATION) at 21:54

## 2024-10-19 RX ADMIN — Medication 3 L/MIN: at 09:21

## 2024-10-19 ASSESSMENT — COGNITIVE AND FUNCTIONAL STATUS - GENERAL
HELP NEEDED FOR BATHING: A LITTLE
HELP NEEDED FOR BATHING: A LITTLE
DAILY ACTIVITIY SCORE: 21
MOBILITY SCORE: 20
MOVING TO AND FROM BED TO CHAIR: A LITTLE
CLIMB 3 TO 5 STEPS WITH RAILING: A LITTLE
MOVING TO AND FROM BED TO CHAIR: A LITTLE
STANDING UP FROM CHAIR USING ARMS: A LITTLE
DRESSING REGULAR LOWER BODY CLOTHING: A LITTLE
MOVING TO AND FROM BED TO CHAIR: A LITTLE
CLIMB 3 TO 5 STEPS WITH RAILING: A LITTLE
STANDING UP FROM CHAIR USING ARMS: A LITTLE
DRESSING REGULAR LOWER BODY CLOTHING: A LITTLE
DRESSING REGULAR UPPER BODY CLOTHING: A LITTLE
WALKING IN HOSPITAL ROOM: A LITTLE
CLIMB 3 TO 5 STEPS WITH RAILING: A LITTLE
MOBILITY SCORE: 20
STANDING UP FROM CHAIR USING ARMS: A LITTLE
DRESSING REGULAR LOWER BODY CLOTHING: A LITTLE
DRESSING REGULAR UPPER BODY CLOTHING: A LITTLE
DRESSING REGULAR LOWER BODY CLOTHING: A LITTLE
HELP NEEDED FOR BATHING: A LITTLE
HELP NEEDED FOR BATHING: A LITTLE
WALKING IN HOSPITAL ROOM: A LITTLE
WALKING IN HOSPITAL ROOM: A LITTLE
DRESSING REGULAR UPPER BODY CLOTHING: A LITTLE
DAILY ACTIVITIY SCORE: 21
DAILY ACTIVITIY SCORE: 21
MOVING TO AND FROM BED TO CHAIR: A LITTLE
CLIMB 3 TO 5 STEPS WITH RAILING: A LITTLE
STANDING UP FROM CHAIR USING ARMS: A LITTLE
WALKING IN HOSPITAL ROOM: A LITTLE
MOBILITY SCORE: 20
DRESSING REGULAR UPPER BODY CLOTHING: A LITTLE

## 2024-10-19 ASSESSMENT — PAIN - FUNCTIONAL ASSESSMENT: PAIN_FUNCTIONAL_ASSESSMENT: 0-10

## 2024-10-19 ASSESSMENT — PAIN SCALES - GENERAL
PAINLEVEL_OUTOF10: 0 - NO PAIN
PAINLEVEL_OUTOF10: 5 - MODERATE PAIN

## 2024-10-19 NOTE — CARE PLAN
The patient's goals for the shift include      The clinical goals for the shift include pt will remain HDS stable throughout shift.    Over the shift, the patient did not make progress toward the following goals.   Problem: Skin  Goal: Promote skin healing  Outcome: Progressing  Flowsheets (Taken 10/19/2024 0328)  Promote skin healing: Assess skin/pad under line(s)/device(s)     Problem: Skin  Goal: Prevent/minimize sheer/friction injuries  Outcome: Progressing  Flowsheets (Taken 10/19/2024 0328)  Prevent/minimize sheer/friction injuries: HOB 30 degrees or less     Problem: Safety - Adult  Goal: Free from fall injury  Outcome: Progressing  Flowsheets (Taken 10/19/2024 0328)  Free from fall injury: Instruct family/caregiver on patient safety     Problem: Chronic Conditions and Co-morbidities  Goal: Patient's chronic conditions and co-morbidity symptoms are monitored and maintained or improved  Outcome: Progressing  Flowsheets (Taken 10/19/2024 0328)  Care Plan - Patient's Chronic Conditions and Co-Morbidity Symptoms are Monitored and Maintained or Improved: Monitor and assess patient's chronic conditions and comorbid symptoms for stability, deterioration, or improvement

## 2024-10-19 NOTE — CARE PLAN
Problem: Skin  Goal: Participates in plan/prevention/treatment measures  Outcome: Progressing  Goal: Promote skin healing  Outcome: Progressing  Goal: Decreased wound size/increased tissue granulation at next dressing change  Outcome: Progressing  Goal: Prevent/manage excess moisture  Outcome: Progressing  Goal: Prevent/minimize sheer/friction injuries  Outcome: Progressing  Goal: Promote/optimize nutrition  Outcome: Progressing     Problem: Pain - Adult  Goal: Verbalizes/displays adequate comfort level or baseline comfort level  Outcome: Progressing     Problem: Safety - Adult  Goal: Free from fall injury  Outcome: Progressing     Problem: Discharge Planning  Goal: Discharge to home or other facility with appropriate resources  Outcome: Progressing     Problem: Chronic Conditions and Co-morbidities  Goal: Patient's chronic conditions and co-morbidity symptoms are monitored and maintained or improved  Outcome: Progressing     Problem: Safety - Medical Restraint  Goal: Remains free of injury from restraints (Restraint for Interference with Medical Device)  Outcome: Progressing   The patient's goals for the shift include  to be able to eat food    The clinical goals for the shift include Patient will remain safe and free from injury throughout shift

## 2024-10-19 NOTE — PROGRESS NOTES
[facility]  ACUTE CARE SURGERY - PROGRESS NOTE    Patient Name: Facundo Youngblood  MRN: [id number]  Admit Date: 1016  : 1955  AGE: 69 y.o.   GENDER: male  ==============================================================================  TODAY'S ASSESSMENT AND PLAN OF CARE:  68 y/o M with PMHx significant for HTN transferred from [facility] for spontaneous splenic rupture requiring MTP, now status-post ex-lap for splenectomy on 10/16 and primarily closed with nadira drain in splenic bed. Pt now on the floor with significant clinical improvement    Neuro:  - Pt with PCA. Will continue until patient has return of bowel function and can tolerate PO    CV:   - While NPO holding home losartan 100 mg daily, amlodipine 10 mg daily, and chlorthalidone 25 mg daily.    - Hydralazine 10 PRN while home meds held     Pulm:   - Duonebs as needed, encourage IS use, on RA     GI:   - NPO given distension and possible ileus  - Bulb with serous drainage as appropriate  - Beckie over incision     : SYLVIA on CKD, BL 1.8-1.9  - mLR 75cc/hr while NPO  - Will trend daily labs     Heme:   - Trending CBC     Endo: No active issues     ID: Leukocytosis likely 2/2 to surgery. Pt afebrile, no .  Likely secondary to surgical intervention.  Will continue to monitor.  Patient has been afebrile.  No need for antibiotics at this time.     Discussed with attending Dr. Eleuterio Humphrey MD  PGY1 Acute Care Surgery  Pager [phone]    ==============================================================================  CHIEF COMPLAINT / EVENTS LAST 24HRS / HPI:  NAEON. Pt reports pain has been reasonably well controlled    MEDICAL HISTORY / ROS:   Admission history and ROS reviewed. Pertinent changes as follows: none    PHYSICAL EXAM:  Heart Rate:  []   Temp:  [36.8 °C (98.2 °F)-37.6 °C (99.7 °F)]   Resp:  [12-21]   BP: (138-168)/()   SpO2:  [91 %-95 %]   Physical Exam  Constitutional:        Appearance: Normal appearance.   Cardiovascular:      Rate and Rhythm: Normal rate.   Pulmonary:      Effort: Pulmonary effort is normal. No respiratory distress.      Breath sounds: No wheezing.   Abdominal:      General: There is distension.      Palpations: Abdomen is soft.      Tenderness: There is no abdominal tenderness. There is no guarding.      Comments: Abdomen mildly distended, nontender to palpation throughout, preveena over midline with good seal, drain output serous   Neurological:      Mental Status: He is alert.         IMAGING SUMMARY:  None new    LABS:  Results from last 7 days   Lab Units 10/18/24  0204 10/17/24  1154 10/17/24  0451   WBC AUTO x10*3/uL 19.5* 16.9* 21.5*   HEMOGLOBIN g/dL 10.4* 10.9* 10.9*   HEMATOCRIT % 30.9* 31.9* 31.7*   PLATELETS AUTO x10*3/uL 100* 94* 86*   NEUTROS PCT AUTO % 76.5 78.2 83.1   LYMPHS PCT AUTO % 5.7 4.3 5.2   MONOS PCT AUTO % 15.8 16.5 11.0   EOS PCT AUTO % 0.7 0.1 0.0     Results from last 7 days   Lab Units 10/18/24  0204 10/17/24  1154 10/16/24  2236   APTT seconds 28 28 31   INR  1.1 1.1 1.1     Results from last 7 days   Lab Units 10/18/24  0204 10/17/24  1154 10/17/24  0451 10/16/24  2236 10/16/24  1447   SODIUM mmol/L 141 141 141   < > 136   POTASSIUM mmol/L 3.8 3.7 4.1   < > 3.2*   CHLORIDE mmol/L 106 108* 108*   < > 103   CO2 mmol/L 26 24 25   < > 21   BUN mg/dL 24* 28* 28*   < > 29*   CREATININE mg/dL 2.00* 2.04* 2.03*   < > 2.09*   CALCIUM mg/dL 8.2* 8.0* 8.2*   < > 8.4*   PROTEIN TOTAL g/dL  --   --   --   --  6.9   BILIRUBIN TOTAL mg/dL  --   --   --   --  0.5   ALK PHOS U/L  --   --   --   --  51   ALT U/L  --   --   --   --  7*   AST U/L  --   --   --   --  11   GLUCOSE mg/dL 111* 116* 115*   < > 105*    < > = values in this interval not displayed.     Results from last 7 days   Lab Units 10/16/24  1447   BILIRUBIN TOTAL mg/dL 0.5     Results from last 7 days   Lab Units 10/18/24  0156 10/17/24  0451 10/17/24  0134   POCT PH, ARTERIAL pH 7.37*  7.40 7.37*   POCT PCO2, ARTERIAL mm Hg 46* 40 40   POCT PO2, ARTERIAL mm Hg 68* 108* 138*   POCT HCO3 CALCULATED, ARTERIAL mmol/L 26.6* 24.8 23.1   POCT BASE EXCESS, ARTERIAL mmol/L 0.8 0.0 -2.0       I have reviewed all medications, laboratory results, and imaging pertinent for today's encounter.

## 2024-10-19 NOTE — NURSING NOTE
Rapid Response Nurse Note:  [x] RADAR alert:   Score 6    Pager time:   Arrival time:   Event end time:   Location:  [id number]  [x] Phone triage     Rapid response initiated by:  [] Rapid Response RN [] Family [] Nursing Supervisor [] Physician   [x] RADAR auto-page [] Sepsis auto-page [] RN [] RT   [] NP/PA [] Other:     Primary reason for call:   [] BAT [] New CPAP/BiPAP [] Bleeding [] Change in mental status   [] Chest pain [] Code blue [] FiO2 >/= 50% [] HR </= 40 bpm   [] HR >/= 130 bpm [] Hyperglycemia [] Hypoglycemia [x] RADAR    [] RR </= 8 bpm [] RR >/= 30 bpm [] SBP </= 90 mmHg [] SpO2 < 90%   [] Seizure [] Sepsis [] Staff concern:     Initial VS and/or RADAR VS:  radar vitals below in bold  Vitals:    10/18/24 1926 10/18/24 2154 10/18/24 2311 10/18/24 2329   BP: 166/80  166/77 138/75   BP Location:   Left arm    Patient Position:   Lying    Pulse: 94  96 98   Resp: 18  18    Temp: 36.9 °C (98.4 °F)  37 °C (98.6 °F)    TempSrc: Temporal  Temporal    SpO2: 91% 92% 91% 93%   Height:          Interventions:  [x] None [] ABG [] Assist w/ICU transfer [] BAT paged    [] Bag mask [] Blood [] Cardioversion [] Code Blue   [] Code blue for intubation [] Code status changed [] Chest x-ray [] EKG   [] IV fluid/bolus [] KUB x-ray [] Labs/cultures [] Medication   [] Nebulizer treatment [] NIPPV (CPAP/BiPAP) [] Oxygen [] Oral airway   [] Peripheral IV [] Palliative care consult [] CT/MRI [] Sepsis protocol    [] Suctioned [] Other:       Outcome:  [] Coded and  [] Code blue for intubation [] Coded and transferred to ICU []  on division   [x] Remained on division (no change) [] Remained on division + additional monitoring [] Remained in ED [] Transferred to ED   [] Transferred to ICU [] Transferred to inpatient status [] Transferred for interventions (procedure) [] Transferred to ICU stepdown    [] Transferred to surgery [] Transferred to telemetry [] Sepsis protocol [] STEMI protocol   [] Stroke  protocol [x] Bedside nurse instructed to page rapid response for any concerns or acute change in condition/VS     Additional Comments: Pox rechecked.  No concerns at this time.

## 2024-10-20 VITALS
DIASTOLIC BLOOD PRESSURE: 77 MMHG | TEMPERATURE: 98.4 F | SYSTOLIC BLOOD PRESSURE: 162 MMHG | OXYGEN SATURATION: 95 % | HEART RATE: 82 BPM | BODY MASS INDEX: 23.25 KG/M2 | HEIGHT: 70 IN | RESPIRATION RATE: 18 BRPM

## 2024-10-20 LAB
ALBUMIN SERPL BCP-MCNC: 3.1 G/DL (ref 3.4–5)
ANION GAP SERPL CALC-SCNC: 12 MMOL/L (ref 10–20)
BACTERIA BLD CULT: NORMAL
BACTERIA BLD CULT: NORMAL
BUN SERPL-MCNC: 27 MG/DL (ref 6–23)
CALCIUM SERPL-MCNC: 8.7 MG/DL (ref 8.6–10.6)
CHLORIDE SERPL-SCNC: 101 MMOL/L (ref 98–107)
CO2 SERPL-SCNC: 27 MMOL/L (ref 21–32)
CREAT SERPL-MCNC: 1.74 MG/DL (ref 0.5–1.3)
EGFRCR SERPLBLD CKD-EPI 2021: 42 ML/MIN/1.73M*2
ERYTHROCYTE [DISTWIDTH] IN BLOOD BY AUTOMATED COUNT: 15.8 % (ref 11.5–14.5)
GLUCOSE BLD MANUAL STRIP-MCNC: 100 MG/DL (ref 74–99)
GLUCOSE BLD MANUAL STRIP-MCNC: 103 MG/DL (ref 74–99)
GLUCOSE BLD MANUAL STRIP-MCNC: 111 MG/DL (ref 74–99)
GLUCOSE BLD MANUAL STRIP-MCNC: 113 MG/DL (ref 74–99)
GLUCOSE BLD MANUAL STRIP-MCNC: 94 MG/DL (ref 74–99)
GLUCOSE BLD MANUAL STRIP-MCNC: 97 MG/DL (ref 74–99)
GLUCOSE BLD MANUAL STRIP-MCNC: 98 MG/DL (ref 74–99)
GLUCOSE SERPL-MCNC: 97 MG/DL (ref 74–99)
HCT VFR BLD AUTO: 29.6 % (ref 41–52)
HGB BLD-MCNC: 9.8 G/DL (ref 13.5–17.5)
MAGNESIUM SERPL-MCNC: 2.05 MG/DL (ref 1.6–2.4)
MCH RBC QN AUTO: 31.1 PG (ref 26–34)
MCHC RBC AUTO-ENTMCNC: 33.1 G/DL (ref 32–36)
MCV RBC AUTO: 94 FL (ref 80–100)
NRBC BLD-RTO: 0 /100 WBCS (ref 0–0)
PHOSPHATE SERPL-MCNC: 3 MG/DL (ref 2.5–4.9)
PLATELET # BLD AUTO: 178 X10*3/UL (ref 150–450)
POTASSIUM SERPL-SCNC: 3.7 MMOL/L (ref 3.5–5.3)
RBC # BLD AUTO: 3.15 X10*6/UL (ref 4.5–5.9)
SODIUM SERPL-SCNC: 136 MMOL/L (ref 136–145)
WBC # BLD AUTO: 16.9 X10*3/UL (ref 4.4–11.3)

## 2024-10-20 PROCEDURE — 80069 RENAL FUNCTION PANEL: CPT

## 2024-10-20 PROCEDURE — 2500000001 HC RX 250 WO HCPCS SELF ADMINISTERED DRUGS (ALT 637 FOR MEDICARE OP)

## 2024-10-20 PROCEDURE — 1100000001 HC PRIVATE ROOM DAILY

## 2024-10-20 PROCEDURE — 82947 ASSAY GLUCOSE BLOOD QUANT: CPT

## 2024-10-20 PROCEDURE — 2500000005 HC RX 250 GENERAL PHARMACY W/O HCPCS

## 2024-10-20 PROCEDURE — 85027 COMPLETE CBC AUTOMATED: CPT

## 2024-10-20 PROCEDURE — 2500000004 HC RX 250 GENERAL PHARMACY W/ HCPCS (ALT 636 FOR OP/ED)

## 2024-10-20 PROCEDURE — 36415 COLL VENOUS BLD VENIPUNCTURE: CPT

## 2024-10-20 PROCEDURE — 83735 ASSAY OF MAGNESIUM: CPT

## 2024-10-20 PROCEDURE — 2500000002 HC RX 250 W HCPCS SELF ADMINISTERED DRUGS (ALT 637 FOR MEDICARE OP, ALT 636 FOR OP/ED)

## 2024-10-20 PROCEDURE — 94640 AIRWAY INHALATION TREATMENT: CPT

## 2024-10-20 RX ORDER — ATORVASTATIN CALCIUM 20 MG/1
20 TABLET, FILM COATED ORAL DAILY
Status: DISPENSED | OUTPATIENT
Start: 2024-10-20

## 2024-10-20 RX ORDER — MAGNESIUM SULFATE 1 G/100ML
1 INJECTION INTRAVENOUS ONCE
Status: COMPLETED | OUTPATIENT
Start: 2024-10-20 | End: 2024-10-20

## 2024-10-20 RX ORDER — POTASSIUM CHLORIDE 1.5 G/1.58G
40 POWDER, FOR SOLUTION ORAL ONCE
Status: COMPLETED | OUTPATIENT
Start: 2024-10-20 | End: 2024-10-20

## 2024-10-20 RX ORDER — ACETAMINOPHEN 325 MG/1
650 TABLET ORAL EVERY 8 HOURS
Status: DISPENSED | OUTPATIENT
Start: 2024-10-20

## 2024-10-20 RX ORDER — OXYCODONE HYDROCHLORIDE 5 MG/1
10 TABLET ORAL EVERY 6 HOURS PRN
Status: DISPENSED | OUTPATIENT
Start: 2024-10-20

## 2024-10-20 RX ORDER — HYDROMORPHONE HYDROCHLORIDE 0.2 MG/ML
0.2 INJECTION INTRAMUSCULAR; INTRAVENOUS; SUBCUTANEOUS EVERY 2 HOUR PRN
Status: ACTIVE | OUTPATIENT
Start: 2024-10-20

## 2024-10-20 RX ORDER — OXYCODONE HYDROCHLORIDE 5 MG/1
5 TABLET ORAL EVERY 6 HOURS PRN
Status: DISPENSED | OUTPATIENT
Start: 2024-10-20

## 2024-10-20 RX ADMIN — MAGNESIUM SULFATE HEPTAHYDRATE 1 G: 1 INJECTION, SOLUTION INTRAVENOUS at 02:25

## 2024-10-20 RX ADMIN — ACETAMINOPHEN 650 MG: 325 TABLET ORAL at 17:21

## 2024-10-20 RX ADMIN — Medication 4 L/MIN: at 09:46

## 2024-10-20 RX ADMIN — HEPARIN SODIUM 5000 UNITS: 5000 INJECTION, SOLUTION INTRAVENOUS; SUBCUTANEOUS at 09:53

## 2024-10-20 RX ADMIN — OXYCODONE HYDROCHLORIDE 10 MG: 5 TABLET ORAL at 13:22

## 2024-10-20 RX ADMIN — POTASSIUM CHLORIDE 40 MEQ: 1.5 POWDER, FOR SOLUTION ORAL at 11:06

## 2024-10-20 RX ADMIN — ATORVASTATIN CALCIUM 20 MG: 20 TABLET, FILM COATED ORAL at 09:53

## 2024-10-20 RX ADMIN — SODIUM CHLORIDE, POTASSIUM CHLORIDE, SODIUM LACTATE AND CALCIUM CHLORIDE 25 ML/HR: 600; 310; 30; 20 INJECTION, SOLUTION INTRAVENOUS at 20:48

## 2024-10-20 RX ADMIN — HEPARIN SODIUM 5000 UNITS: 5000 INJECTION, SOLUTION INTRAVENOUS; SUBCUTANEOUS at 20:48

## 2024-10-20 RX ADMIN — Medication 2.5 L/MIN: at 20:00

## 2024-10-20 RX ADMIN — POTASSIUM PHOSPHATE, MONOBASIC AND POTASSIUM PHOSPHATE, DIBASIC 15 MMOL: 224; 236 INJECTION, SOLUTION, CONCENTRATE INTRAVENOUS at 03:32

## 2024-10-20 RX ADMIN — IPRATROPIUM BROMIDE AND ALBUTEROL SULFATE 3 ML: .5; 3 SOLUTION RESPIRATORY (INHALATION) at 08:08

## 2024-10-20 RX ADMIN — IPRATROPIUM BROMIDE AND ALBUTEROL SULFATE 3 ML: .5; 3 SOLUTION RESPIRATORY (INHALATION) at 15:34

## 2024-10-20 RX ADMIN — OXYCODONE HYDROCHLORIDE 5 MG: 5 TABLET ORAL at 23:25

## 2024-10-20 RX ADMIN — ACETAMINOPHEN 650 MG: 325 TABLET ORAL at 09:53

## 2024-10-20 RX ADMIN — HEPARIN SODIUM 5000 UNITS: 5000 INJECTION, SOLUTION INTRAVENOUS; SUBCUTANEOUS at 14:43

## 2024-10-20 RX ADMIN — SODIUM CHLORIDE, POTASSIUM CHLORIDE, SODIUM LACTATE AND CALCIUM CHLORIDE 75 ML/HR: 600; 310; 30; 20 INJECTION, SOLUTION INTRAVENOUS at 00:33

## 2024-10-20 ASSESSMENT — COGNITIVE AND FUNCTIONAL STATUS - GENERAL
CLIMB 3 TO 5 STEPS WITH RAILING: A LITTLE
HELP NEEDED FOR BATHING: A LITTLE
WALKING IN HOSPITAL ROOM: A LITTLE
STANDING UP FROM CHAIR USING ARMS: A LITTLE
CLIMB 3 TO 5 STEPS WITH RAILING: A LITTLE
DAILY ACTIVITIY SCORE: 23
CLIMB 3 TO 5 STEPS WITH RAILING: A LITTLE
DRESSING REGULAR LOWER BODY CLOTHING: A LITTLE
MOBILITY SCORE: 22
MOVING TO AND FROM BED TO CHAIR: A LITTLE
DRESSING REGULAR UPPER BODY CLOTHING: A LITTLE
MOVING TO AND FROM BED TO CHAIR: A LITTLE
STANDING UP FROM CHAIR USING ARMS: A LITTLE
DRESSING REGULAR LOWER BODY CLOTHING: A LITTLE
WALKING IN HOSPITAL ROOM: A LITTLE
DRESSING REGULAR UPPER BODY CLOTHING: A LITTLE
DRESSING REGULAR LOWER BODY CLOTHING: A LITTLE
HELP NEEDED FOR BATHING: A LITTLE
WALKING IN HOSPITAL ROOM: A LITTLE

## 2024-10-20 ASSESSMENT — PAIN SCALES - GENERAL
PAINLEVEL_OUTOF10: 0 - NO PAIN
PAINLEVEL_OUTOF10: 7
PAINLEVEL_OUTOF10: 4
PAINLEVEL_OUTOF10: 3
PAINLEVEL_OUTOF10: 5 - MODERATE PAIN
PAINLEVEL_OUTOF10: 4

## 2024-10-20 ASSESSMENT — PAIN DESCRIPTION - DESCRIPTORS: DESCRIPTORS: DISCOMFORT;ACHING

## 2024-10-20 ASSESSMENT — PAIN DESCRIPTION - LOCATION
LOCATION: ABDOMEN

## 2024-10-20 ASSESSMENT — PAIN - FUNCTIONAL ASSESSMENT
PAIN_FUNCTIONAL_ASSESSMENT: 0-10
PAIN_FUNCTIONAL_ASSESSMENT: 0-10

## 2024-10-20 ASSESSMENT — PAIN DESCRIPTION - ORIENTATION
ORIENTATION: MID
ORIENTATION: MID

## 2024-10-20 NOTE — CARE PLAN
The patient's goals for the shift include      The clinical goals for the shift include patient will have decreased pain, score <7 throughout this shift    Problem: Skin  Goal: Participates in plan/prevention/treatment measures  Outcome: Progressing  Goal: Promote skin healing  Outcome: Progressing  Goal: Decreased wound size/increased tissue granulation at next dressing change  Outcome: Progressing  Goal: Prevent/manage excess moisture  Outcome: Progressing  Goal: Prevent/minimize sheer/friction injuries  Outcome: Progressing  Goal: Promote/optimize nutrition  Outcome: Progressing     Problem: Pain - Adult  Goal: Verbalizes/displays adequate comfort level or baseline comfort level  Outcome: Progressing     Problem: Safety - Adult  Goal: Free from fall injury  Outcome: Progressing     Problem: Discharge Planning  Goal: Discharge to home or other facility with appropriate resources  Outcome: Progressing     Problem: Chronic Conditions and Co-morbidities  Goal: Patient's chronic conditions and co-morbidity symptoms are monitored and maintained or improved  Outcome: Progressing     Problem: Safety - Medical Restraint  Goal: Remains free of injury from restraints (Restraint for Interference with Medical Device)  Outcome: Progressing

## 2024-10-20 NOTE — PROGRESS NOTES
[facility]  ACUTE CARE SURGERY - PROGRESS NOTE    Patient Name: Facundo Youngblood  MRN: [id number]  Admit Date: 1016  : 1955  AGE: 69 y.o.   GENDER: male  ==============================================================================  TODAY'S ASSESSMENT AND PLAN OF CARE:  70 y/o M with PMHx significant for HTN transferred from [facility] for spontaneous splenic rupture requiring MTP, now status-post ex-lap for splenectomy on 10/16 and primarily closed with nadira drain in splenic bed. Pt now on the floor with significant clinical improvement    Neuro:  - Discontinue PCA  - Scheduled tylenol  - PRN oxy, dilauded     CV:   - Hold home losartan 100 mg daily, amlodipine 10 mg daily, and chlorthalidone 25 mg daily.    - Hydralazine 10 PRN while home meds held     Pulm:   - Duonebs as needed, encourage IS use, on RA     GI:   - Start limited clears   - Maintain drain (165 today from 185)  - Prevena over incision     : SYLVIA on CKD, BL 1.8-1.9  - Decrease IVF   - Will trend daily labs     Heme:   - Trending CBC     Endo:   - No active issues     ID:   - No indication for abx      Patient discussed with attending Dr. Chamorro.    Priya Page  PGY2 General Surgery   ACS [id number]       ==============================================================================  CHIEF COMPLAINT / EVENTS LAST 24HRS / HPI:  NAEO. Passing flatus, no BM yet.     MEDICAL HISTORY / ROS:   Admission history and ROS reviewed. Pertinent changes as follows: none    PHYSICAL EXAM:  Heart Rate:  []   Temp:  [37 °C (98.6 °F)-37.5 °C (99.5 °F)]   Resp:  [14-16]   BP: (148-165)/(72-79)   SpO2:  [90 %-95 %]   Physical Exam  Constitutional:       Appearance: Normal appearance.   Cardiovascular:      Rate and Rhythm: Normal rate.   Pulmonary:      Effort: Pulmonary effort is normal. No respiratory distress.      Breath sounds: No wheezing.   Abdominal:      General: There is distension.      Palpations:  Abdomen is soft.      Tenderness: There is no abdominal tenderness. There is no guarding.      Comments: Abdomen mildly distended, nontender to palpation throughout, prevena over midline with good seal, drain output serous   Neurological:      Mental Status: He is alert.         IMAGING SUMMARY:  None new    LABS:  Results from last 7 days   Lab Units 10/20/24  0525 10/19/24  1850 10/18/24  0204 10/17/24  1154 10/17/24  0451   WBC AUTO x10*3/uL 16.9* 18.1* 19.5* 16.9* 21.5*   HEMOGLOBIN g/dL 9.8* 9.8* 10.4* 10.9* 10.9*   HEMATOCRIT % 29.6* 29.1* 30.9* 31.9* 31.7*   PLATELETS AUTO x10*3/uL 178 158 100* 94* 86*   NEUTROS PCT AUTO %  --   --  76.5 78.2 83.1   LYMPHS PCT AUTO %  --   --  5.7 4.3 5.2   MONOS PCT AUTO %  --   --  15.8 16.5 11.0   EOS PCT AUTO %  --   --  0.7 0.1 0.0     Results from last 7 days   Lab Units 10/18/24  0204 10/17/24  1154 10/16/24  2236   APTT seconds 28 28 31   INR  1.1 1.1 1.1     Results from last 7 days   Lab Units 10/20/24  0525 10/19/24  1850 10/18/24  0204 10/16/24  2236 10/16/24  1447   SODIUM mmol/L 136 138 141   < > 136   POTASSIUM mmol/L 3.7 3.7 3.8   < > 3.2*   CHLORIDE mmol/L 101 101 106   < > 103   CO2 mmol/L 27 28 26   < > 21   BUN mg/dL 27* 25* 24*   < > 29*   CREATININE mg/dL 1.74* 1.74* 2.00*   < > 2.09*   CALCIUM mg/dL 8.7 8.8 8.2*   < > 8.4*   PROTEIN TOTAL g/dL  --   --   --   --  6.9   BILIRUBIN TOTAL mg/dL  --   --   --   --  0.5   ALK PHOS U/L  --   --   --   --  51   ALT U/L  --   --   --   --  7*   AST U/L  --   --   --   --  11   GLUCOSE mg/dL 97 93 111*   < > 105*    < > = values in this interval not displayed.     Results from last 7 days   Lab Units 10/16/24  1447   BILIRUBIN TOTAL mg/dL 0.5     Results from last 7 days   Lab Units 10/18/24  0156 10/17/24  0451 10/17/24  0134   POCT PH, ARTERIAL pH 7.37* 7.40 7.37*   POCT PCO2, ARTERIAL mm Hg 46* 40 40   POCT PO2, ARTERIAL mm Hg 68* 108* 138*   POCT HCO3 CALCULATED, ARTERIAL mmol/L 26.6* 24.8 23.1   POCT BASE  EXCESS, ARTERIAL mmol/L 0.8 0.0 -2.0       I have reviewed all medications, laboratory results, and imaging pertinent for today's encounter.

## 2024-10-21 LAB
ALBUMIN SERPL BCP-MCNC: 3.5 G/DL (ref 3.4–5)
ANION GAP SERPL CALC-SCNC: 15 MMOL/L (ref 10–20)
BUN SERPL-MCNC: 28 MG/DL (ref 6–23)
CALCIUM SERPL-MCNC: 8.7 MG/DL (ref 8.6–10.6)
CHLORIDE SERPL-SCNC: 103 MMOL/L (ref 98–107)
CO2 SERPL-SCNC: 22 MMOL/L (ref 21–32)
CREAT SERPL-MCNC: 1.79 MG/DL (ref 0.5–1.3)
EGFRCR SERPLBLD CKD-EPI 2021: 41 ML/MIN/1.73M*2
ERYTHROCYTE [DISTWIDTH] IN BLOOD BY AUTOMATED COUNT: 15.9 % (ref 11.5–14.5)
GLUCOSE BLD MANUAL STRIP-MCNC: 109 MG/DL (ref 74–99)
GLUCOSE BLD MANUAL STRIP-MCNC: 109 MG/DL (ref 74–99)
GLUCOSE BLD MANUAL STRIP-MCNC: 116 MG/DL (ref 74–99)
GLUCOSE BLD MANUAL STRIP-MCNC: 131 MG/DL (ref 74–99)
GLUCOSE BLD MANUAL STRIP-MCNC: 141 MG/DL (ref 74–99)
GLUCOSE SERPL-MCNC: 102 MG/DL (ref 74–99)
HCT VFR BLD AUTO: 37.1 % (ref 41–52)
HGB BLD-MCNC: 11.3 G/DL (ref 13.5–17.5)
LIPASE FLD-CCNC: 30 U/L
MAGNESIUM SERPL-MCNC: 2.15 MG/DL (ref 1.6–2.4)
MCH RBC QN AUTO: 31.3 PG (ref 26–34)
MCHC RBC AUTO-ENTMCNC: 30.5 G/DL (ref 32–36)
MCV RBC AUTO: 103 FL (ref 80–100)
NRBC BLD-RTO: 0.1 /100 WBCS (ref 0–0)
PHOSPHATE SERPL-MCNC: 2.4 MG/DL (ref 2.5–4.9)
PLATELET # BLD AUTO: 209 X10*3/UL (ref 150–450)
POTASSIUM SERPL-SCNC: 4 MMOL/L (ref 3.5–5.3)
RBC # BLD AUTO: 3.61 X10*6/UL (ref 4.5–5.9)
SODIUM SERPL-SCNC: 136 MMOL/L (ref 136–145)
WBC # BLD AUTO: 16.3 X10*3/UL (ref 4.4–11.3)

## 2024-10-21 PROCEDURE — 2500000005 HC RX 250 GENERAL PHARMACY W/O HCPCS

## 2024-10-21 PROCEDURE — 85027 COMPLETE CBC AUTOMATED: CPT

## 2024-10-21 PROCEDURE — 2500000001 HC RX 250 WO HCPCS SELF ADMINISTERED DRUGS (ALT 637 FOR MEDICARE OP)

## 2024-10-21 PROCEDURE — 2500000004 HC RX 250 GENERAL PHARMACY W/ HCPCS (ALT 636 FOR OP/ED)

## 2024-10-21 PROCEDURE — 82947 ASSAY GLUCOSE BLOOD QUANT: CPT

## 2024-10-21 PROCEDURE — 2500000002 HC RX 250 W HCPCS SELF ADMINISTERED DRUGS (ALT 637 FOR MEDICARE OP, ALT 636 FOR OP/ED)

## 2024-10-21 PROCEDURE — 97530 THERAPEUTIC ACTIVITIES: CPT | Mod: GP | Performed by: PHYSICAL THERAPIST

## 2024-10-21 PROCEDURE — 83690 ASSAY OF LIPASE: CPT

## 2024-10-21 PROCEDURE — 1100000001 HC PRIVATE ROOM DAILY

## 2024-10-21 PROCEDURE — 83735 ASSAY OF MAGNESIUM: CPT

## 2024-10-21 PROCEDURE — 36415 COLL VENOUS BLD VENIPUNCTURE: CPT

## 2024-10-21 PROCEDURE — 97116 GAIT TRAINING THERAPY: CPT | Mod: GP | Performed by: PHYSICAL THERAPIST

## 2024-10-21 PROCEDURE — 94640 AIRWAY INHALATION TREATMENT: CPT

## 2024-10-21 PROCEDURE — 80069 RENAL FUNCTION PANEL: CPT

## 2024-10-21 RX ORDER — SODIUM,POTASSIUM PHOSPHATES 280-250MG
1 POWDER IN PACKET (EA) ORAL 4 TIMES DAILY
Status: COMPLETED | OUTPATIENT
Start: 2024-10-21 | End: 2024-10-21

## 2024-10-21 RX ADMIN — POTASSIUM & SODIUM PHOSPHATES POWDER PACK 280-160-250 MG 1 PACKET: 280-160-250 PACK at 12:50

## 2024-10-21 RX ADMIN — ATORVASTATIN CALCIUM 20 MG: 20 TABLET, FILM COATED ORAL at 08:37

## 2024-10-21 RX ADMIN — ACETAMINOPHEN 650 MG: 325 TABLET ORAL at 08:37

## 2024-10-21 RX ADMIN — IPRATROPIUM BROMIDE AND ALBUTEROL SULFATE 3 ML: .5; 3 SOLUTION RESPIRATORY (INHALATION) at 20:56

## 2024-10-21 RX ADMIN — HEPARIN SODIUM 5000 UNITS: 5000 INJECTION, SOLUTION INTRAVENOUS; SUBCUTANEOUS at 22:03

## 2024-10-21 RX ADMIN — Medication 2 L/MIN: at 22:03

## 2024-10-21 RX ADMIN — ACETAMINOPHEN 650 MG: 325 TABLET ORAL at 17:07

## 2024-10-21 RX ADMIN — IPRATROPIUM BROMIDE AND ALBUTEROL SULFATE 3 ML: .5; 3 SOLUTION RESPIRATORY (INHALATION) at 14:41

## 2024-10-21 RX ADMIN — HEPARIN SODIUM 5000 UNITS: 5000 INJECTION, SOLUTION INTRAVENOUS; SUBCUTANEOUS at 08:36

## 2024-10-21 RX ADMIN — HEPARIN SODIUM 5000 UNITS: 5000 INJECTION, SOLUTION INTRAVENOUS; SUBCUTANEOUS at 17:07

## 2024-10-21 RX ADMIN — IPRATROPIUM BROMIDE AND ALBUTEROL SULFATE 3 ML: .5; 3 SOLUTION RESPIRATORY (INHALATION) at 09:23

## 2024-10-21 RX ADMIN — POTASSIUM & SODIUM PHOSPHATES POWDER PACK 280-160-250 MG 1 PACKET: 280-160-250 PACK at 17:07

## 2024-10-21 ASSESSMENT — PAIN SCALES - GENERAL
PAINLEVEL_OUTOF10: 3
PAINLEVEL_OUTOF10: 2
PAINLEVEL_OUTOF10: 2
PAINLEVEL_OUTOF10: 4

## 2024-10-21 ASSESSMENT — COGNITIVE AND FUNCTIONAL STATUS - GENERAL
MOVING TO AND FROM BED TO CHAIR: A LITTLE
STANDING UP FROM CHAIR USING ARMS: A LITTLE
MOBILITY SCORE: 18
CLIMB 3 TO 5 STEPS WITH RAILING: A LITTLE
DAILY ACTIVITIY SCORE: 24
CLIMB 3 TO 5 STEPS WITH RAILING: A LITTLE
TURNING FROM BACK TO SIDE WHILE IN FLAT BAD: A LITTLE
WALKING IN HOSPITAL ROOM: A LITTLE
MOVING FROM LYING ON BACK TO SITTING ON SIDE OF FLAT BED WITH BEDRAILS: A LITTLE
MOBILITY SCORE: 22
WALKING IN HOSPITAL ROOM: A LITTLE

## 2024-10-21 ASSESSMENT — PAIN - FUNCTIONAL ASSESSMENT
PAIN_FUNCTIONAL_ASSESSMENT: 0-10

## 2024-10-21 ASSESSMENT — PAIN DESCRIPTION - LOCATION: LOCATION: ABDOMEN

## 2024-10-21 NOTE — CARE PLAN
Problem: Skin  Goal: Participates in plan/prevention/treatment measures  Outcome: Progressing  Goal: Promote skin healing  Outcome: Progressing  Goal: Decreased wound size/increased tissue granulation at next dressing change  Outcome: Progressing  Goal: Prevent/manage excess moisture  Outcome: Progressing  Goal: Prevent/minimize sheer/friction injuries  Outcome: Progressing  Goal: Promote/optimize nutrition  Outcome: Progressing     Problem: Pain - Adult  Goal: Verbalizes/displays adequate comfort level or baseline comfort level  Outcome: Progressing     Problem: Safety - Adult  Goal: Free from fall injury  Outcome: Progressing     Problem: Discharge Planning  Goal: Discharge to home or other facility with appropriate resources  Outcome: Progressing     Problem: Chronic Conditions and Co-morbidities  Goal: Patient's chronic conditions and co-morbidity symptoms are monitored and maintained or improved  Outcome: Progressing     Problem: Safety - Medical Restraint  Goal: Remains free of injury from restraints (Restraint for Interference with Medical Device)  Outcome: Progressing

## 2024-10-21 NOTE — CARE PLAN
Problem: Skin  Goal: Participates in plan/prevention/treatment measures  Outcome: Progressing  Flowsheets (Taken 10/17/2024 0817 by Ani Costa RN)  Participates in plan/prevention/treatment measures: Elevate heels  Goal: Promote skin healing  Outcome: Progressing  Flowsheets (Taken 10/19/2024 0328 by Mliad Candelario RN)  Promote skin healing: Assess skin/pad under line(s)/device(s)  Goal: Decreased wound size/increased tissue granulation at next dressing change  Outcome: Progressing  Flowsheets (Taken 10/21/2024 0328)  Decreased wound size/increased tissue granulation at next dressing change: Promote sleep for wound healing  Goal: Prevent/manage excess moisture  Outcome: Progressing  Flowsheets (Taken 10/21/2024 0328)  Prevent/manage excess moisture: Cleanse incontinence/protect with barrier cream  Goal: Prevent/minimize sheer/friction injuries  Outcome: Progressing  Flowsheets (Taken 10/21/2024 0328)  Prevent/minimize sheer/friction injuries: HOB 30 degrees or less  Goal: Promote/optimize nutrition  Outcome: Progressing  Flowsheets (Taken 10/21/2024 0328)  Promote/optimize nutrition: Consume > 50% meals/supplements     Problem: Pain - Adult  Goal: Verbalizes/displays adequate comfort level or baseline comfort level  Outcome: Progressing     Problem: Safety - Adult  Goal: Free from fall injury  Outcome: Progressing     Problem: Discharge Planning  Goal: Discharge to home or other facility with appropriate resources  Outcome: Progressing     Problem: Chronic Conditions and Co-morbidities  Goal: Patient's chronic conditions and co-morbidity symptoms are monitored and maintained or improved  Outcome: Progressing     Problem: Safety - Medical Restraint  Goal: Remains free of injury from restraints (Restraint for Interference with Medical Device)  Outcome: Progressing   The patient's goals for the shift include      The clinical goals for the shift include patient pain will tolerable

## 2024-10-21 NOTE — PROGRESS NOTES
Communication Note    Patient Name: Facundo Youngblood  MRN: [id number]  Today's Date: 10/21/2024   Room: [address] [place], [place] [address]    Discipline: Occupational Therapy      Missed Visit: Yes  Missed Visit Reason:  (Patient with PT at this time. To reattempt as able/medically appropriate.) at 1048.      10/21/24 at 12:08 PM   Mey Rich OT   Rehab Office: [phone]

## 2024-10-21 NOTE — PROGRESS NOTES
[facility]  ACUTE CARE SURGERY - PROGRESS NOTE    Patient Name: Facundo Youngblood  MRN: [id number]  Admit Date: 1016  : 1955  AGE: 69 y.o.   GENDER: male  ==============================================================================  TODAY'S ASSESSMENT AND PLAN OF CARE:  68 y/o M with PMHx significant for HTN transferred from [facility] for spontaneous splenic rupture requiring MTP, now status-post ex-lap for splenectomy on 10/16 and primarily closed with nadira drain in splenic bed. Pt now on the floor with significant clinical improvement    Neuro:  - Scheduled tylenol  - PRN oxy, dilauded     CV:   - Hold home losartan 100 mg daily, amlodipine 10 mg daily, and chlorthalidone 25 mg daily.    - Hydralazine 10 PRN while home meds held     Pulm:   - Duonebs as needed, encourage IS use, on RA     GI:   - Advance to regular diet  - Maintain drain (140 today from 165 yesterday)  - Prevena over incision     : SYLVIA on CKD, BL 1.8-1.9  [facility] continues to be advanced   - Will trend daily labs     Heme:   - Trending CBC     Endo:   - No active issues     ID:   - No indication for abx      Patient discussed with attending Dr. Centeno.    Sebastian Grullon MD   PGY1 General Surgery   ACS [id number]       ==============================================================================  CHIEF COMPLAINT / EVENTS LAST 24HRS / HPI:  NAEO.   Still regularly passing flatus but has not yet had a bowel movement.  He has been tolerating his CLD quite well with no N/V at this time     MEDICAL HISTORY / ROS:   Admission history and ROS reviewed. Pertinent changes as follows: none    PHYSICAL EXAM:  Heart Rate:  [65-85]   Temp:  [35.9 °C (96.6 °F)-36.9 °C (98.4 °F)]   Resp:  [17-21]   BP: (150-162)/(58-77)   SpO2:  [91 %-98 %]   Physical Exam  Constitutional:       Appearance: Normal appearance.   Cardiovascular:      Rate and Rhythm: Normal rate.   Pulmonary:      Effort:  Pulmonary effort is normal. No respiratory distress.      Breath sounds: No wheezing.   Abdominal:      General: There is distension.      Palpations: Abdomen is soft.      Tenderness: There is no abdominal tenderness. There is no guarding.      Comments: Abdomen mildly distended, nontender to palpation throughout, prevena over midline with good seal, drain output serous   Neurological:      Mental Status: He is alert.         IMAGING SUMMARY:  None new    LABS:  Results from last 7 days   Lab Units 10/21/24  0527 10/20/24  0525 10/19/24  1850 10/18/24  0204 10/17/24  1154 10/17/24  0451   WBC AUTO x10*3/uL 16.3* 16.9* 18.1* 19.5* 16.9* 21.5*   HEMOGLOBIN g/dL 11.3* 9.8* 9.8* 10.4* 10.9* 10.9*   HEMATOCRIT % 37.1* 29.6* 29.1* 30.9* 31.9* 31.7*   PLATELETS AUTO x10*3/uL 209 178 158 100* 94* 86*   NEUTROS PCT AUTO %  --   --   --  76.5 78.2 83.1   LYMPHS PCT AUTO %  --   --   --  5.7 4.3 5.2   MONOS PCT AUTO %  --   --   --  15.8 16.5 11.0   EOS PCT AUTO %  --   --   --  0.7 0.1 0.0     Results from last 7 days   Lab Units 10/18/24  0204 10/17/24  1154 10/16/24  2236   APTT seconds 28 28 31   INR  1.1 1.1 1.1     Results from last 7 days   Lab Units 10/21/24  0527 10/20/24  0525 10/19/24  1850 10/16/24  2236 10/16/24  1447   SODIUM mmol/L 136 136 138   < > 136   POTASSIUM mmol/L 4.0 3.7 3.7   < > 3.2*   CHLORIDE mmol/L 103 101 101   < > 103   CO2 mmol/L 22 27 28   < > 21   BUN mg/dL 28* 27* 25*   < > 29*   CREATININE mg/dL 1.79* 1.74* 1.74*   < > 2.09*   CALCIUM mg/dL 8.7 8.7 8.8   < > 8.4*   PROTEIN TOTAL g/dL  --   --   --   --  6.9   BILIRUBIN TOTAL mg/dL  --   --   --   --  0.5   ALK PHOS U/L  --   --   --   --  51   ALT U/L  --   --   --   --  7*   AST U/L  --   --   --   --  11   GLUCOSE mg/dL 102* 97 93   < > 105*    < > = values in this interval not displayed.     Results from last 7 days   Lab Units 10/16/24  1447   BILIRUBIN TOTAL mg/dL 0.5     Results from last 7 days   Lab Units 10/18/24  0156  10/17/24  0451 10/17/24  0134   POCT PH, ARTERIAL pH 7.37* 7.40 7.37*   POCT PCO2, ARTERIAL mm Hg 46* 40 40   POCT PO2, ARTERIAL mm Hg 68* 108* 138*   POCT HCO3 CALCULATED, ARTERIAL mmol/L 26.6* 24.8 23.1   POCT BASE EXCESS, ARTERIAL mmol/L 0.8 0.0 -2.0       I have reviewed all medications, laboratory results, and imaging pertinent for today's encounter.

## 2024-10-21 NOTE — PROGRESS NOTES
Physical Therapy    Physical Therapy Treatment    Patient Name: Facundo Youngblood  MRN: [id number]  Department: [facility]  Room: [facility]9066  Today's Date: 10/21/2024  Time Calculation  Start Time: 1030  Stop Time: 1058  Time Calculation (min): 28 min         Assessment/Plan   PT Assessment  PT Assessment Results: Decreased endurance, Impaired balance, Decreased mobility, Pain  Rehab Prognosis: Excellent  Strengths: Attitude of self  Barriers to Participation: Comorbidities  End of Session Communication: Bedside nurse  Assessment Comment: pt limited by SOB and fatigue although able to increase tolerance today. Pt would still benefit from services to improve to PLOF  End of Session Patient Position: Up in chair, Alarm off, not on at start of session  PT Plan  Inpatient/Swing Bed or Outpatient: Inpatient  PT Plan  Treatment/Interventions: Bed mobility, Transfer training, Gait training, Stair training, Balance training, Strengthening, Endurance training, Range of motion, Therapeutic exercise, Therapeutic activity  PT Plan: Ongoing PT  PT Frequency: 5 times per week  PT Discharge Recommendations: Low intensity level of continued care  Equipment Recommended upon Discharge:  (TBD)  PT Recommended Transfer Status: Stand by assist  PT - OK to Discharge: Yes      General Visit Information:   PT  Visit  PT Received On: 10/21/24  General  Reason for Referral: transfer from OSH for spontaneous splenic rupture. At OSH ED was noted to be hypotensive and 2u pRBC transfused. In [facility] MTP was activated and he received 7 units pRBC and 5 FFP and was intubated before proceeding to OR for exploratory laparotomy and splenectomy revealing a ruptured spleen  Past Medical History Relevant to Rehab: hypertension, hyperlipidemia, emphysema, CKD 3B  Family/Caregiver Present: No  Prior to Session Communication: Bedside nurse  Patient Position Received: Bed, 3 rail up, Alarm off, not on at start of session  General Comment: Pt supine in bed,  argreeable to PT (pt on 3 L NC O2)    Subjective   Precautions:  Precautions  Medical Precautions: Oxygen therapy device and L/min, Fall precautions, Abdominal precautions  Post-Surgical Precautions: Abdominal surgery precautions    Vital Signs (Past 2hrs)        Date/Time Vitals Session Patient Position Pulse Resp SpO2 BP MAP (mmHg)    10/21/24 1030 During PT  --  88  --  94 %  --  --     10/21/24 1100 --  --  91  17  93 %  147/79  101                         Objective   Pain:  Pain Assessment  Pain Assessment: 0-10  0-10 (Numeric) Pain Score: 4  Pain Type: Acute pain  Pain Location: Abdomen  Pain Interventions: Repositioned, Distraction  Cognition:  Cognition  Orientation Level: Oriented X4  Coordination:  Movements are Fluid and Coordinated: Yes  Postural Control:  Postural Control  Postural Control: Within Functional Limits  Static Sitting Balance  Static Sitting-Balance Support: Feet supported  Static Sitting-Level of Assistance: Distant supervision  Static Standing Balance  Static Standing-Balance Support: No upper extremity supported  Static Standing-Level of Assistance: Close supervision    Activity Tolerance:  Activity Tolerance  Endurance: Tolerates 10 - 20 min exercise with multiple rests  Treatments:       Therapeutic Activity  Therapeutic Activity Performed: Yes  Therapeutic Activity 1: STS from bench 1 x 5    Bed Mobility  Bed Mobility: Yes  Bed Mobility 1  Bed Mobility 1: Supine to sitting  Level of Assistance 1: Contact guard  Bed Mobility Comments 1: using log roll with HOB elevated    Ambulation/Gait Training  Ambulation/Gait Training Performed: Yes  Ambulation/Gait Training 1  Surface 1: Level tile  Device 1: Rolling walker  Assistance 1: Close supervision  Quality of Gait 1: Inconsistent stride length, Decreased step length, Wide base of support  Comments/Distance (ft) 1: pt ambulated 60 ft x 1 and 100ft x 2 with seated rest breaks between each trial due to fatigue.  Transfers  Transfer:  Yes  Transfer 1  Transfer From 1: Sit to, Stand to  Transfer to 1: Stand, Sit  Technique 1: Sit to stand, Stand to sit  Transfer Level of Assistance 1: Close supervision  Trials/Comments 1: performed 7 trials.    Stairs  Stairs: No    Outcome Measures:  [facility] Basic Mobility  Turning from your back to your side while in a flat bed without using bedrails: A little  Moving from lying on your back to sitting on the side of a flat bed without using bedrails: A little  Moving to and from bed to chair (including a wheelchair): A little  Standing up from a chair using your arms (e.g. wheelchair or bedside chair): A little  To walk in hospital room: A little  Climbing 3-5 steps with railing: A little  Basic Mobility - Total Score: 18    Education Documentation  Precautions, taught by Tereza Martinez PT at 10/21/2024 12:21 PM.  Learner: Patient  Readiness: Acceptance  Method: Explanation  Response: Verbalizes Understanding    Body Mechanics, taught by Tereza Martinez PT at 10/21/2024 12:21 PM.  Learner: Patient  Readiness: Acceptance  Method: Explanation  Response: Verbalizes Understanding    Mobility Training, taught by Tereza Martinez PT at 10/21/2024 12:21 PM.  Learner: Patient  Readiness: Acceptance  Method: Explanation  Response: Verbalizes Understanding    Education Comments  No comments found.        OP EDUCATION:       Encounter Problems       Encounter Problems (Active)       Balance       Pt will maintain dynamic standing balance without UE support during functional activities with SBA  (Progressing)       Start:  10/18/24    Expected End:  11/01/24               Mobility       STG - Patient will ambulate >100ft using LRAD Minnie  (Progressing)       Start:  10/18/24    Expected End:  11/01/24            STG - Patient will ambulate up and down a curb/step with LRAD Minnie  (Progressing)       Start:  10/18/24    Expected End:  11/01/24               PT Transfers       Pt will complete all functional  transfers using LRAD Minnie  (Progressing)       Start:  10/18/24    Expected End:  11/01/24               Pain - Adult

## 2024-10-22 ENCOUNTER — PHARMACY VISIT (OUTPATIENT)
Dept: PHARMACY | Facility: CLINIC | Age: 69
End: 2024-10-22
Payer: COMMERCIAL

## 2024-10-22 VITALS
SYSTOLIC BLOOD PRESSURE: 154 MMHG | HEIGHT: 70 IN | DIASTOLIC BLOOD PRESSURE: 74 MMHG | HEART RATE: 88 BPM | BODY MASS INDEX: 23.25 KG/M2 | OXYGEN SATURATION: 93 % | TEMPERATURE: 97.3 F | RESPIRATION RATE: 18 BRPM

## 2024-10-22 LAB
ALBUMIN SERPL BCP-MCNC: 3.6 G/DL (ref 3.4–5)
ANION GAP SERPL CALC-SCNC: 15 MMOL/L (ref 10–20)
ATRIAL RATE: 64 BPM
BUN SERPL-MCNC: 23 MG/DL (ref 6–23)
CALCIUM SERPL-MCNC: 8.9 MG/DL (ref 8.6–10.6)
CHLORIDE SERPL-SCNC: 102 MMOL/L (ref 98–107)
CO2 SERPL-SCNC: 25 MMOL/L (ref 21–32)
CREAT SERPL-MCNC: 1.8 MG/DL (ref 0.5–1.3)
EGFRCR SERPLBLD CKD-EPI 2021: 40 ML/MIN/1.73M*2
ERYTHROCYTE [DISTWIDTH] IN BLOOD BY AUTOMATED COUNT: 15.1 % (ref 11.5–14.5)
GLUCOSE BLD MANUAL STRIP-MCNC: 103 MG/DL (ref 74–99)
GLUCOSE BLD MANUAL STRIP-MCNC: 136 MG/DL (ref 74–99)
GLUCOSE BLD MANUAL STRIP-MCNC: 159 MG/DL (ref 74–99)
GLUCOSE SERPL-MCNC: 95 MG/DL (ref 74–99)
HCT VFR BLD AUTO: 32.9 % (ref 41–52)
HGB BLD-MCNC: 11.4 G/DL (ref 13.5–17.5)
MAGNESIUM SERPL-MCNC: 1.95 MG/DL (ref 1.6–2.4)
MCH RBC QN AUTO: 31.5 PG (ref 26–34)
MCHC RBC AUTO-ENTMCNC: 34.7 G/DL (ref 32–36)
MCV RBC AUTO: 91 FL (ref 80–100)
NRBC BLD-RTO: 0.1 /100 WBCS (ref 0–0)
P AXIS: 21 DEGREES
P OFFSET: 178 MS
P ONSET: 153 MS
PHOSPHATE SERPL-MCNC: 2.8 MG/DL (ref 2.5–4.9)
PLATELET # BLD AUTO: 293 X10*3/UL (ref 150–450)
POTASSIUM SERPL-SCNC: 3.9 MMOL/L (ref 3.5–5.3)
PR INTERVAL: 134 MS
Q ONSET: 220 MS
QRS COUNT: 11 BEATS
QRS DURATION: 116 MS
QT INTERVAL: 408 MS
QTC CALCULATION(BAZETT): 420 MS
QTC FREDERICIA: 416 MS
R AXIS: 268 DEGREES
RBC # BLD AUTO: 3.62 X10*6/UL (ref 4.5–5.9)
SODIUM SERPL-SCNC: 138 MMOL/L (ref 136–145)
T AXIS: -12 DEGREES
T OFFSET: 424 MS
VENTRICULAR RATE: 64 BPM
WBC # BLD AUTO: 17.1 X10*3/UL (ref 4.4–11.3)

## 2024-10-22 PROCEDURE — 2500000001 HC RX 250 WO HCPCS SELF ADMINISTERED DRUGS (ALT 637 FOR MEDICARE OP)

## 2024-10-22 PROCEDURE — 97116 GAIT TRAINING THERAPY: CPT | Mod: GP,CQ

## 2024-10-22 PROCEDURE — 2500000004 HC RX 250 GENERAL PHARMACY W/ HCPCS (ALT 636 FOR OP/ED): Performed by: NURSE PRACTITIONER

## 2024-10-22 PROCEDURE — 90620 MENB-4C VACCINE IM: CPT | Performed by: NURSE PRACTITIONER

## 2024-10-22 PROCEDURE — 90677 PCV20 VACCINE IM: CPT | Performed by: NURSE PRACTITIONER

## 2024-10-22 PROCEDURE — 90648 HIB PRP-T VACCINE 4 DOSE IM: CPT | Performed by: NURSE PRACTITIONER

## 2024-10-22 PROCEDURE — 97530 THERAPEUTIC ACTIVITIES: CPT | Mod: GP,CQ

## 2024-10-22 PROCEDURE — 80069 RENAL FUNCTION PANEL: CPT

## 2024-10-22 PROCEDURE — 90472 IMMUNIZATION ADMIN EACH ADD: CPT | Performed by: NURSE PRACTITIONER

## 2024-10-22 PROCEDURE — 83735 ASSAY OF MAGNESIUM: CPT

## 2024-10-22 PROCEDURE — 85027 COMPLETE CBC AUTOMATED: CPT

## 2024-10-22 PROCEDURE — G0009 ADMIN PNEUMOCOCCAL VACCINE: HCPCS | Performed by: NURSE PRACTITIONER

## 2024-10-22 PROCEDURE — RXMED WILLOW AMBULATORY MEDICATION CHARGE

## 2024-10-22 PROCEDURE — 2500000002 HC RX 250 W HCPCS SELF ADMINISTERED DRUGS (ALT 637 FOR MEDICARE OP, ALT 636 FOR OP/ED)

## 2024-10-22 PROCEDURE — 82947 ASSAY GLUCOSE BLOOD QUANT: CPT

## 2024-10-22 PROCEDURE — 99239 HOSP IP/OBS DSCHRG MGMT >30: CPT | Performed by: NURSE PRACTITIONER

## 2024-10-22 PROCEDURE — 90471 IMMUNIZATION ADMIN: CPT | Performed by: NURSE PRACTITIONER

## 2024-10-22 PROCEDURE — 2500000004 HC RX 250 GENERAL PHARMACY W/ HCPCS (ALT 636 FOR OP/ED)

## 2024-10-22 PROCEDURE — 94640 AIRWAY INHALATION TREATMENT: CPT

## 2024-10-22 PROCEDURE — 36415 COLL VENOUS BLD VENIPUNCTURE: CPT

## 2024-10-22 RX ORDER — OXYCODONE HYDROCHLORIDE 10 MG/1
10 TABLET ORAL EVERY 6 HOURS PRN
Qty: 15 TABLET | Refills: 0 | Status: SHIPPED | OUTPATIENT
Start: 2024-10-22 | End: 2024-10-28 | Stop reason: SDUPTHER

## 2024-10-22 RX ORDER — ACETAMINOPHEN 325 MG/1
650 TABLET ORAL EVERY 8 HOURS PRN
Qty: 84 TABLET | Refills: 0 | Status: SHIPPED | OUTPATIENT
Start: 2024-10-22 | End: 2024-11-05

## 2024-10-22 RX ORDER — LIDOCAINE HYDROCHLORIDE 10 MG/ML
INJECTION, SOLUTION INFILTRATION; PERINEURAL
Status: DISCONTINUED
Start: 2024-10-22 | End: 2024-10-22 | Stop reason: HOSPADM

## 2024-10-22 RX ADMIN — INSULIN LISPRO 2 UNITS: 100 INJECTION, SOLUTION INTRAVENOUS; SUBCUTANEOUS at 08:46

## 2024-10-22 RX ADMIN — ATORVASTATIN CALCIUM 20 MG: 20 TABLET, FILM COATED ORAL at 08:46

## 2024-10-22 RX ADMIN — IPRATROPIUM BROMIDE AND ALBUTEROL SULFATE 3 ML: .5; 3 SOLUTION RESPIRATORY (INHALATION) at 14:36

## 2024-10-22 RX ADMIN — ACETAMINOPHEN 650 MG: 325 TABLET ORAL at 08:46

## 2024-10-22 RX ADMIN — PNEUMOCOCCAL 20-VALENT CONJUGATE VACCINE 0.5 ML
2.2; 2.2; 2.2; 2.2; 2.2; 2.2; 2.2; 2.2; 2.2; 2.2; 2.2; 2.2; 2.2; 2.2; 2.2; 2.2; 4.4; 2.2; 2.2; 2.2 INJECTION, SUSPENSION INTRAMUSCULAR at 16:36

## 2024-10-22 RX ADMIN — IPRATROPIUM BROMIDE AND ALBUTEROL SULFATE 3 ML: .5; 3 SOLUTION RESPIRATORY (INHALATION) at 09:36

## 2024-10-22 RX ADMIN — HEPARIN SODIUM 5000 UNITS: 5000 INJECTION, SOLUTION INTRAVENOUS; SUBCUTANEOUS at 08:46

## 2024-10-22 RX ADMIN — ACETAMINOPHEN 650 MG: 325 TABLET ORAL at 01:32

## 2024-10-22 RX ADMIN — HAEMOPHILUS B POLYSACCHARIDE CONJUGATE VACCINE FOR INJ 0.5 ML: RECON SOLN at 16:39

## 2024-10-22 RX ADMIN — NEISSERIA MENINGITIDIS SEROGROUP B NHBA FUSION PROTEIN ANTIGEN, NEISSERIA MENINGITIDIS SEROGROUP B FHBP FUSION PROTEIN ANTIGEN AND NEISSERIA MENINGITIDIS SEROGROUP B NADA PROTEIN ANTIGEN 0.5 ML: 50; 50; 50; 25 INJECTION, SUSPENSION INTRAMUSCULAR at 16:38

## 2024-10-22 RX ADMIN — OXYCODONE HYDROCHLORIDE 10 MG: 5 TABLET ORAL at 12:12

## 2024-10-22 ASSESSMENT — PAIN SCALES - GENERAL
PAINLEVEL_OUTOF10: 4

## 2024-10-22 ASSESSMENT — COGNITIVE AND FUNCTIONAL STATUS - GENERAL
MOVING TO AND FROM BED TO CHAIR: A LITTLE
MOBILITY SCORE: 18
WALKING IN HOSPITAL ROOM: A LITTLE
STANDING UP FROM CHAIR USING ARMS: A LITTLE
MOVING FROM LYING ON BACK TO SITTING ON SIDE OF FLAT BED WITH BEDRAILS: A LITTLE
TURNING FROM BACK TO SIDE WHILE IN FLAT BAD: A LITTLE
CLIMB 3 TO 5 STEPS WITH RAILING: A LITTLE

## 2024-10-22 ASSESSMENT — PAIN - FUNCTIONAL ASSESSMENT
PAIN_FUNCTIONAL_ASSESSMENT: 0-10
PAIN_FUNCTIONAL_ASSESSMENT: 0-10

## 2024-10-22 ASSESSMENT — PAIN DESCRIPTION - DESCRIPTORS: DESCRIPTORS: ACHING;DISCOMFORT

## 2024-10-22 NOTE — DISCHARGE SUMMARY
Discharge Diagnosis  Spleen hematoma, initial encounter    Issues Requiring Follow-Up  Postop follow up     Test Results Pending At Discharge  Pending Labs       Order Current Status    Surgical Pathology Exam In process    VERIFY ABO/Rh Group Test In process            Hospital Course  69-year-old male who initially presented to outside hospital with complaints of generalized weakness and dizziness.  For the previous 4 days prior to presentation patient had been having flulike symptoms, vomiting diarrhea hot and cold sweats.  He was starting to feel better however he developed sharp pain in his left side necessitating an emergency department visit.  He was scanned and found to have spontaneous splenic rupture.  Patient received blood transfusions and was transferred to [facility].  Patient was taken emergently to the operating room where he underwent splenectomy on 10/16.  Patient admitted to the ICU postoperatively for hemodynamic monitoring, resuscitation and hypoxic respiratory failure. He was able to be extubated on POD #1 and transferred to floor on POD#2. Patient remained bowel rest due to expected postop ileus. With return of bowel function his diet was slowly advanced for which he tolerated well. His prevena wound vac was removed on POD #6 and incision remained clean and dry. BECKY drain removed prior to discharge.   Patient was provided with following splenectomy vaccines prior to discharge:  Haemophilus B conjugate  Meningococcal B vaccines (Bexsero)  Pneumococcal (Prevenar 20)    He will need another Bexsero injection approximately 8 weeks after first dose and then 1 year later.     On the day of discharge, patient remained hemodynamically stable, tolerating a diet, pain controlled and having bowel function. He was provided with detailed discharge instructions and given a short course of pain medication. He is to have close follow up in [facility] clinic.       Total time spent on discharge planning and education  approximately forty minutes.     Pertinent Physical Exam At Time of Discharge  Physical Exam  Constitutional:       Appearance: Normal appearance.   Eyes:      Extraocular Movements: Extraocular movements intact.   Cardiovascular:      Rate and Rhythm: Normal rate.   Pulmonary:      Effort: Pulmonary effort is normal.   Abdominal:      Comments: Soft, mildly distended, appropriately tender to palpitation. Midline incision prevena removed- midline incision with staples and well approximated with some ecchymosis around the incision. BECKY with serous sang drainage removed and dry clean dressing applied.    Musculoskeletal:         General: Normal range of motion.      Cervical back: Normal range of motion.   Skin:     General: Skin is warm and dry.   Neurological:      Mental Status: He is alert and oriented to person, place, and time.   Psychiatric:         Behavior: Behavior normal.         Home Medications     Medication List      START taking these medications     acetaminophen 325 mg tablet; Commonly known as: Tylenol; Take 2 tablets   (650 mg) by mouth every 8 hours if needed for mild pain (1 - 3) for up to   14 days.   oxyCODONE 10 mg immediate release tablet; Commonly known as: Roxicodone;   Take 1 tablet (10 mg) by mouth every 6 hours if needed for moderate pain   (4 - 6) for up to 5 days.     CONTINUE taking these medications     amLODIPine 10 mg tablet; Commonly known as: Norvasc; TAKE 1 TABLET(10   MG) BY MOUTH DAILY   atorvastatin 20 mg tablet; Commonly known as: Lipitor; TAKE 1 TABLET BY   MOUTH DAILY   chlorthalidone 25 mg tablet; Commonly known as: [place]; TAKE 1 TABLET   BY MOUTH DAILY   Farxiga 10 mg; Generic drug: dapagliflozin propanediol; Take 1 tablet   (10 mg) by mouth once daily.   losartan 100 mg tablet; Commonly known as: Cozaar; TAKE 1 TABLET BY   MOUTH DAILY   Proventil HFA 90 mcg/actuation inhaler; Generic drug: albuterol       Outpatient Follow-Up  Future Appointments   Date Time Provider  Department Center   11/5/2024  1:30 [id number] [id number] [facility]   1/13/2025  9:00 AM Isabel Chiu MD [id number] [facility]   4/22/2025  2:00 PM Gely Wilcox MD [id number] [facility]       Jean-Claude Marques, APRN-CNP

## 2024-10-22 NOTE — PROGRESS NOTES
Facundo Youngblood is a 69 y.o. male on day 6 of admission presenting with Spleen hematoma, initial encounter.      Transitional Care Coordination Progress Note:  Patient discussed during interdisciplinary rounds.     Plan per Medical/Surgical team:   Pt medically ready for DC.    Home. No home going needs anticipated for this pt at this time.      Payer: Medicare    Status: INP    Discharge disposition: Home. No home going needs anticipated for this pt at this time.       Potential Barriers: None    ADOD: 10/22    This TCC will continue to follow for home going needs and safe DC plan.        QUITA PENG

## 2024-10-22 NOTE — PROGRESS NOTES
Physical Therapy    Physical Therapy Treatment    Patient Name: Facundo Youngblood  MRN: [id number]  Department: [facility]  Room: [facility]-  Today's Date: 10/22/2024  Time Calculation  Start Time: 1015  Stop Time: 1043  Time Calculation (min): 28 min         Assessment/Plan   PT Assessment  End of Session Communication: Bedside nurse  Assessment Comment: pt limited by SOB and fatigue.Pt would still benefit from services to improve to PLOF  End of Session Patient Position: Bed, 3 rail up, Alarm off, not on at start of session  PT Plan  Inpatient/Swing Bed or Outpatient: Inpatient  PT Plan  Treatment/Interventions: Bed mobility, Transfer training, Gait training, Stair training, Balance training, Strengthening, Endurance training, Range of motion, Therapeutic exercise, Therapeutic activity  PT Plan: Ongoing PT  PT Frequency: 5 times per week  PT Discharge Recommendations: Low intensity level of continued care  Equipment Recommended upon Discharge: Wheeled walker  PT Recommended Transfer Status: Stand by assist  PT - OK to Discharge: Yes      General Visit Information:   PT  Visit  PT Received On: 10/22/24  Response to Previous Treatment: Patient with no complaints from previous session.  General  Reason for Referral: transfer from OSH for spontaneous splenic rupture. At OSH ED was noted to be hypotensive and 2u pRBC transfused. In [facility] ED MTP was activated and he received 7 units pRBC and 5 FFP and was intubated before proceeding to OR for exploratory laparotomy and splenectomy revealing a ruptured spleen  Past Medical History Relevant to Rehab: hypertension, hyperlipidemia, emphysema, CKD 3B  Prior to Session Communication: Bedside nurse  Patient Position Received: Bed, 3 rail up, Alarm off, not on at start of session  General Comment: Pt supine in bed, argreeable to PT    Subjective   Precautions:  Precautions  Hearing/Visual Limitations: WFL    Vital Signs (Past 2hrs)        Date/Time Vitals Session Patient  Position Pulse Resp SpO2 BP MAP (mmHg)    10/22/24 1015 Pre PT  Lying  73  --  95 %  138/66  --     10/22/24 1100 --  --  88  18  93 %  154/74  101                       Objective   Pain:  Pain Assessment  Pain Assessment: 0-10  0-10 (Numeric) Pain Score: 4  Pain Type: Acute pain, Surgical pain  Pain Location: Abdomen  Pain Orientation: Mid  Pain Descriptors: Aching, Discomfort  Pain Frequency: Intermittent  Pain Onset: Ongoing  Clinical Progression: Gradually improving  Pain Interventions: Medication (See MAR), Repositioned, Ambulation/increased activity  Response to Interventions: slight increase post treatment session  Cognition:  Cognition  Overall Cognitive Status: Within Functional Limits  Orientation Level: Oriented X4     Postural Control:  Static Sitting Balance  Static Sitting-Balance Support: Feet supported  Static Sitting-Level of Assistance: Distant supervision  Dynamic Sitting Balance  Dynamic Sitting-Balance Support: Feet supported  Dynamic Sitting-Level of Assistance: Distant supervision  Dynamic Sitting-Balance: Trunk control activities (carlos alberto roman)  Static Standing Balance  Static Standing-Balance Support: No upper extremity supported  Static Standing-Level of Assistance: Close supervision  Dynamic Standing Balance  Dynamic Standing-Balance Support: Bilateral upper extremity supported (fww)  Dynamic Standing-Level of Assistance: Close supervision  Dynamic Standing-Balance: Turning    Activity Tolerance:  Activity Tolerance  Endurance: Tolerates 10 - 20 min exercise with multiple rests  Treatments:     Therapeutic Activity  Therapeutic Activity Performed: Yes  Therapeutic Activity 1: STS x5    Bed Mobility  Bed Mobility: Yes  Bed Mobility 1  Bed Mobility 1: Supine to sitting  Level of Assistance 1: Close supervision  Bed Mobility Comments 1: using log roll with HOB elevated    Ambulation/Gait Training  Ambulation/Gait Training Performed: Yes  Ambulation/Gait Training 1  Surface 1: Level  tile  Device 1: Rolling walker  Assistance 1: Close supervision  Quality of Gait 1: Inconsistent stride length, Decreased step length, Wide base of support  Comments/Distance (ft) 1: pt ambulated 60 ft x 1 and 100ft x 2 with seated rest breaks between each trial due to fatigue.  Transfers  Transfer: Yes  Transfer 1  Transfer From 1: Sit to, Stand to  Transfer to 1: Stand, Sit  Technique 1: Sit to stand, Stand to sit  Transfer Device 1: Walker  Transfer Level of Assistance 1: Close supervision    Outcome Measures:  [facility] Basic Mobility  Turning from your back to your side while in a flat bed without using bedrails: A little  Moving from lying on your back to sitting on the side of a flat bed without using bedrails: A little  Moving to and from bed to chair (including a wheelchair): A little  Standing up from a chair using your arms (e.g. wheelchair or bedside chair): A little  To walk in hospital room: A little  Climbing 3-5 steps with railing: A little  Basic Mobility - Total Score: 18    Education Documentation  Precautions, taught by Ethan West PTA at 10/22/2024 11:55 AM.  Learner: Patient  Readiness: Acceptance  Method: Explanation  Response: Verbalizes Understanding    Body Mechanics, taught by Ethan West PTA at 10/22/2024 11:55 AM.  Learner: Patient  Readiness: Acceptance  Method: Explanation  Response: Verbalizes Understanding    Mobility Training, taught by Ethan West PTA at 10/22/2024 11:55 AM.  Learner: Patient  Readiness: Acceptance  Method: Explanation  Response: Verbalizes Understanding    Education Comments  No comments found.           Encounter Problems       Encounter Problems (Active)       Balance       Pt will maintain dynamic standing balance without UE support during functional activities with SBA  (Progressing)       Start:  10/18/24    Expected End:  11/01/24               Mobility       STG - Patient will ambulate >100ft using LRAD Minnie  (Progressing)       Start:  10/18/24     Expected End:  11/01/24            STG - Patient will ambulate up and down a curb/step with LRAD Minnie  (Progressing)       Start:  10/18/24    Expected End:  11/01/24               PT Transfers       Pt will complete all functional transfers using LRAD Minnie  (Progressing)       Start:  10/18/24    Expected End:  11/01/24               Pain - Adult

## 2024-10-23 ENCOUNTER — PATIENT OUTREACH (OUTPATIENT)
Dept: PRIMARY CARE | Facility: CLINIC | Age: 69
End: 2024-10-23
Payer: MEDICARE

## 2024-10-23 ENCOUNTER — TELEPHONE (OUTPATIENT)
Dept: SURGERY | Facility: HOSPITAL | Age: 69
End: 2024-10-23

## 2024-10-23 NOTE — PROGRESS NOTES
Discharge Facility: [facility]     Discharge Diagnosis:    Spleen hematoma, initial encounter     Issues Requiring Follow-Up  Postop follow up       Admission Date: 10/16/2024   Discharge Date:  10/22/2024     PCP Appointment Date:     Specialist Appointment Date:     11/5/2024 Gen Surg Post op     Hospital Encounter and Summary Linked: Yes    See discharge assessment below for further details     Engagement  Call Start Time: 1110 (10/23/2024 11:10 AM)    Medications  Medications reviewed with patient/caregiver?: Yes (10/23/2024 11:10 AM)  Is the patient having any side effects they believe may be caused by any medication additions or changes?: No (10/23/2024 11:10 AM)  Does the patient have all medications ordered at discharge?: No (10/23/2024 11:10 AM)  What is keeping the patient from filling the prescriptions?: -- (oxycodone being delivered by Olive salguero from  [facility] today) (10/23/2024 11:10 AM)  Care Management Interventions: No intervention needed (10/23/2024 11:10 AM)  Prescription Comments: Patient has no questions or concerns (10/23/2024 11:10 AM)  Is the patient taking all medications as directed (includes completed medication regime)?: -- (Pt has all other meds) (10/23/2024  9:29 AM)  Medication Comments: Patient awaiting pain med delivery today (10/23/2024 11:10 AM)    Appointments  Does the patient have a primary care provider?: Yes (10/23/2024 11:10 AM)  Care Management Interventions: Verified appointment date/time/provider (10/23/2024 11:10 AM)  Has the patient kept scheduled appointments due by today?: Yes (10/23/2024 11:10 AM)  Care Management Interventions: Advised patient to keep appointment (10/23/2024 11:10 AM)    Self Management  What is the home health agency?: NA (10/23/2024 11:10 AM)  What Durable Medical Equipment (DME) was ordered?: NA (10/23/2024 11:10 AM)    Patient Teaching  Does the patient have access to their discharge instructions?: Yes (10/23/2024 11:10 AM)  Care Management  Interventions: Reviewed instructions with patient (10/23/2024 11:10 AM)  What is the patient's perception of their health status since discharge?: Improving (10/23/2024 11:10 AM)  Is the patient/caregiver able to teach back the hierarchy of who to call/visit for symptoms/problems? PCP, Specialist, Home Health nurse, Urgent Care, ED, 911: Yes (10/23/2024 11:10 AM)  Patient/Caregiver Education Comments: Patient will have Oxycodone delivered today, spoke to Carlene as  meds to beds was unable to have medication  delivered by DC yesterday , Olive [facility] will deliver , patient is aware. Patient states has family support if in need. I have educated on SSX infection, when to call providers for any questions concerns or change in condition (10/23/2024 11:10 AM)

## 2024-10-24 ENCOUNTER — APPOINTMENT (OUTPATIENT)
Dept: PRIMARY CARE | Facility: CLINIC | Age: 69
End: 2024-10-24
Payer: MEDICARE

## 2024-10-27 NOTE — PROGRESS NOTES
[facility]  TRAUMA CLINIC PROGRESS NOTE    Patient Name: Facundo Youngblood  MRN: [id number]  Admit Date:   : 1955  AGE: 69 y.o.   GENDER: male  ==============================================================================  CHIEF COMPLAINT:   Spontaneous splenic rupture (10/16/2024-10/22/2024)    OTHER MEDICAL PROBLEMS:  HTN  HLD  OA  Renal artery stenosis  Lung nodules  GERD  COPD  Aneurysm of thoracic artery    INCIDENTAL FINDINGS:  NA    PROCEDURES:  10/16/2024: Exploration Laparotomy, Splenectomy and Placement of 19Fr nadira drain    PATHOLOGY:  FINAL DIAGNOSIS   A.  SPLEEN, SPLENECTOMY:  -- SPLEEN (85.9 GRAMS) WITH FOCAL HEMORRHAGIC DISRUPTION.     NOTE: There is no morphologic evidence of malignancy. [place] red was negative for amyloid. Clinical correlation is recommended.      ==============================================================================  TODAY'S ASSESSMENT AND PLAN OF CARE:  STAPLES/SUTURE REMOVAL  - 19 staples removed without issue from midline incision   - 1 suture removed without issue from LLQ old drain site  - keep area clean using warm, soapy water  - do not scrub the area  - pat the area dry  - keep the area clean and dry   - once the wound is completely healed over, it is ok to massage vitamin E oil onto the area to help decrease scar formation     WOUND CARE - Midline incision   - Take daily showers  - Allow warm, soapy water to wash over wound  - Do not scrub at the wound  - When out of the shower, gently pat the wound dry.  - Do not apply lotions, ointments or creams  - Avoid soaking in bodies of water (bathtub, hot tubs, pools, lakes, etc) until wound is completely healed  - No heavy lifting > 10 lbs until 6 weeks after surgery    SPLENECTOMY VACCINES  - okay to receive second dose of vaccines with PCP on or after 24.   -  Vaccines include: Bexsero, Pneumovac 23, Menevo      FOLLOW UP/CALL  - No trauma/ACS follow up   - Return to  clinic or ER sooner if pt. has any development of erythema, drainage, swelling, pain, fevers, or chills  - If you have questions or concerns that are not urgent, please feel free to call  [phone].  - Call [phone] to make additional appointment(s) as needed if unable to reschedule in office today    ==============================================================================  HISTORY OF PRESENT ILLNESS  Mr. Youngblood is a 70 y/o gentleman who was hospitalized from 10/16-10/22 s/p splenectomy from spontaneous splenic rupture. His post operative course was complicated by an ileus. A wound vac was placed upon discharge. BECYK drain removed prior to discharge. He did receive his first round of splenectomy vaccines on 10/22/24.   Patient is doing well today. He states that he has been lying low since surgery. He is tolerating a regular diet without any concerns.   Patient is eating, drinking, voiding and having flatus, bowel movements.   MEDICAL HISTORY / ROS:  Admission history and ROS reviewed.   Patient denies:  fevers; chills; headache;  dizziness; chest pain; shortness of breath; nausea/vomiting/diarrhea/constipation; new/worsening abdominal pain or numbness/tingling/weakness of extremities.   Pertinent changes as follows:  NA    PHYSICAL EXAM:  GCS 15, A+OX3, RRR, S1, S2, CTA=, no increased WOB. Abd soft, nt, nd. MAEx4, MU 5/5 x4, no extremity edema noted. 2+pp. Well approximated ISHAN, 19 staples and 1 suture removed without issue.      LABS:  No results found for this or any previous visit (from the past 24 hours).  MEDICATIONS:  Current Outpatient Medications   Medication Sig Dispense Refill    acetaminophen (Tylenol) 325 mg tablet Take 2 tablets (650 mg) by mouth every 8 hours if needed for mild pain (1 - 3) for up to 14 days. 84 tablet 0    albuterol (Proventil HFA) 90 mcg/actuation inhaler Inhale 2 puffs 4 times a day.      amLODIPine (Norvasc) 10 mg tablet TAKE 1 TABLET(10 MG) BY MOUTH DAILY 90 tablet  1    atorvastatin (Lipitor) 20 mg tablet TAKE 1 TABLET BY MOUTH DAILY 90 tablet 1    chlorthalidone (Hygroton) 25 mg tablet TAKE 1 TABLET BY MOUTH DAILY 90 tablet 1    Farxiga 10 mg Take 1 tablet (10 mg) by mouth once daily. 90 tablet 3    losartan (Cozaar) 100 mg tablet TAKE 1 TABLET BY MOUTH DAILY 90 tablet 1    oxyCODONE (Roxicodone) 10 mg immediate release tablet Take 1 tablet (10 mg) by mouth every 6 hours if needed for moderate pain (4 - 6) for up to 5 days. 15 tablet 0     No current facility-administered medications for this visit.       IMAGING SUMMARY:  (summary of new imaging findings, not a copy of dictation)  NA    I have reviewed all laboratory and imaging results ordered/pertinent for today's encounter.

## 2024-10-28 ENCOUNTER — TELEPHONE (OUTPATIENT)
Dept: PRIMARY CARE | Facility: CLINIC | Age: 69
End: 2024-10-28

## 2024-10-28 ENCOUNTER — APPOINTMENT (OUTPATIENT)
Dept: PRIMARY CARE | Facility: CLINIC | Age: 69
End: 2024-10-28
Payer: MEDICARE

## 2024-10-28 VITALS
WEIGHT: 160 LBS | HEIGHT: 70 IN | HEART RATE: 78 BPM | OXYGEN SATURATION: 99 % | SYSTOLIC BLOOD PRESSURE: 110 MMHG | DIASTOLIC BLOOD PRESSURE: 58 MMHG | BODY MASS INDEX: 22.9 KG/M2

## 2024-10-28 DIAGNOSIS — Z09 HOSPITAL DISCHARGE FOLLOW-UP: Primary | ICD-10-CM

## 2024-10-28 DIAGNOSIS — J44.9 SUSPECTED CHRONIC OBSTRUCTIVE PULMONARY DISEASE BASED ON INITIAL EVALUATION (MULTI): ICD-10-CM

## 2024-10-28 DIAGNOSIS — G89.18 POSTOPERATIVE PAIN: ICD-10-CM

## 2024-10-28 DIAGNOSIS — Z90.81 STATUS POST SPLENECTOMY: ICD-10-CM

## 2024-10-28 PROBLEM — R03.0 FINDING OF ABOVE NORMAL BLOOD PRESSURE: Status: ACTIVE | Noted: 2021-01-11

## 2024-10-28 PROBLEM — Z11.52 ENCOUNTER FOR SCREENING FOR COVID-19: Status: RESOLVED | Noted: 2023-01-31 | Resolved: 2024-10-28

## 2024-10-28 PROBLEM — G44.319 ACUTE POST-TRAUMATIC HEADACHE: Status: ACTIVE | Noted: 2024-10-28

## 2024-10-28 PROBLEM — R68.89 ABNORMAL FINDING ON EVALUATION PROCEDURE: Status: ACTIVE | Noted: 2021-01-11

## 2024-10-28 PROBLEM — E88.89: Status: ACTIVE | Noted: 2020-10-12

## 2024-10-28 PROBLEM — D17.9 LIPOMA: Status: ACTIVE | Noted: 2024-10-28

## 2024-10-28 PROBLEM — Z86.0100 HISTORY OF COLONIC POLYPS: Status: ACTIVE | Noted: 2024-10-28

## 2024-10-28 PROBLEM — R03.0 ELEVATED BLOOD PRESSURE READING WITHOUT DIAGNOSIS OF HYPERTENSION: Status: ACTIVE | Noted: 2021-01-11

## 2024-10-28 PROBLEM — R63.4 ABNORMAL WEIGHT LOSS: Status: ACTIVE | Noted: 2024-10-28

## 2024-10-28 LAB
LAB AP ASR DISCLAIMER: NORMAL
LABORATORY COMMENT REPORT: NORMAL
PATH REPORT.FINAL DX SPEC: NORMAL
PATH REPORT.GROSS SPEC: NORMAL
PATH REPORT.MICROSCOPIC SPEC OTHER STN: NORMAL
PATH REPORT.RELEVANT HX SPEC: NORMAL
PATH REPORT.TOTAL CANCER: NORMAL
RESIDENT REVIEW: NORMAL

## 2024-10-28 PROCEDURE — 3078F DIAST BP <80 MM HG: CPT | Performed by: STUDENT IN AN ORGANIZED HEALTH CARE EDUCATION/TRAINING PROGRAM

## 2024-10-28 PROCEDURE — 3008F BODY MASS INDEX DOCD: CPT | Performed by: STUDENT IN AN ORGANIZED HEALTH CARE EDUCATION/TRAINING PROGRAM

## 2024-10-28 PROCEDURE — 1158F ADVNC CARE PLAN TLK DOCD: CPT | Performed by: STUDENT IN AN ORGANIZED HEALTH CARE EDUCATION/TRAINING PROGRAM

## 2024-10-28 PROCEDURE — 3074F SYST BP LT 130 MM HG: CPT | Performed by: STUDENT IN AN ORGANIZED HEALTH CARE EDUCATION/TRAINING PROGRAM

## 2024-10-28 PROCEDURE — 1160F RVW MEDS BY RX/DR IN RCRD: CPT | Performed by: STUDENT IN AN ORGANIZED HEALTH CARE EDUCATION/TRAINING PROGRAM

## 2024-10-28 PROCEDURE — 1111F DSCHRG MED/CURRENT MED MERGE: CPT | Performed by: STUDENT IN AN ORGANIZED HEALTH CARE EDUCATION/TRAINING PROGRAM

## 2024-10-28 PROCEDURE — 1125F AMNT PAIN NOTED PAIN PRSNT: CPT | Performed by: STUDENT IN AN ORGANIZED HEALTH CARE EDUCATION/TRAINING PROGRAM

## 2024-10-28 PROCEDURE — 1123F ACP DISCUSS/DSCN MKR DOCD: CPT | Performed by: STUDENT IN AN ORGANIZED HEALTH CARE EDUCATION/TRAINING PROGRAM

## 2024-10-28 PROCEDURE — 99495 TRANSJ CARE MGMT MOD F2F 14D: CPT | Performed by: STUDENT IN AN ORGANIZED HEALTH CARE EDUCATION/TRAINING PROGRAM

## 2024-10-28 PROCEDURE — 1159F MED LIST DOCD IN RCRD: CPT | Performed by: STUDENT IN AN ORGANIZED HEALTH CARE EDUCATION/TRAINING PROGRAM

## 2024-10-28 RX ORDER — ALBUTEROL SULFATE 90 UG/1
2 INHALANT RESPIRATORY (INHALATION)
Qty: 18 G | Refills: 2 | Status: SHIPPED | OUTPATIENT
Start: 2024-10-28

## 2024-10-28 RX ORDER — OXYCODONE HYDROCHLORIDE 10 MG/1
10 TABLET ORAL EVERY 6 HOURS PRN
Qty: 15 TABLET | Refills: 0 | Status: SHIPPED | OUTPATIENT
Start: 2024-10-28 | End: 2024-11-02

## 2024-10-28 ASSESSMENT — PAIN SCALES - GENERAL: PAINLEVEL_OUTOF10: 5

## 2024-10-28 ASSESSMENT — COLUMBIA-SUICIDE SEVERITY RATING SCALE - C-SSRS: 1. IN THE PAST MONTH, HAVE YOU WISHED YOU WERE DEAD OR WISHED YOU COULD GO TO SLEEP AND NOT WAKE UP?: NO

## 2024-10-28 NOTE — PROGRESS NOTES
"Patient: Facundo Youngblood  : 1955  PCP: Isabel Chiu MD  MRN: [id number]  Program: Transitional Care Management  Status: Enrolled  Effective Dates: 10/23/2024 - present  Responsible Staff: Nevin Gallo  Social Drivers to be Addressed: Alcohol Use, Financial Resource Strain, Physical Activity, Social Connections, Stress, Tobacco Use, Transportation Needs         Facundo Youngblood is a 69 y.o. male presenting today for follow-up after being discharged from the hospital 7 days ago. The main problem requiring admission was spleen hematoma, spleen rupture s/p splenectomy. The discharge summary and/or Transitional Care Management documentation was reviewed. Medication reconciliation was performed as indicated via the \"Chaitanya as Reviewed\" timestamp.     Facundo Youngblood was contacted by Transitional Care Management services two days after his discharge. This encounter and supporting documentation was reviewed.      Hospital Course  69-year-old male who initially presented to outside hospital with complaints of generalized weakness and dizziness.  For the previous 4 days prior to presentation patient had been having flulike symptoms, vomiting diarrhea hot and cold sweats.  He was starting to feel better however he developed sharp pain in his left side necessitating an emergency department visit.  He was scanned and found to have spontaneous splenic rupture.  Patient received blood transfusions and was transferred to [facility].  Patient was taken emergently to the operating room where he underwent splenectomy on 10/16.  Patient admitted to the ICU postoperatively for hemodynamic monitoring, resuscitation and hypoxic respiratory failure. He was able to be extubated on POD #1 and transferred to floor on POD#2. Patient remained bowel rest due to expected postop ileus. With return of bowel function his diet was slowly advanced for which he tolerated well. His prevena wound vac was removed on POD #6 and " "incision remained clean and dry. BECKY drain removed prior to discharge.   Patient was provided with following splenectomy vaccines prior to discharge:  Haemophilus B conjugate  Meningococcal B vaccines (Bexsero)  Pneumococcal (Prevenar 20)     He will need another Bexsero injection approximately 8 weeks after first dose and then 1 year later.      On the day of discharge, patient remained hemodynamically stable, tolerating a diet, pain controlled and having bowel function. He was provided with detailed discharge instructions and given a short course of pain medication. He is to have close follow up in [facility] clinic.     Doing well since discharge. Slowly regaining strength. Daughter is here today with him and has been checking in on him regularly. Not receiving any [facility] services, denies any needs. Blood pressure has been running lower than typical for him but still in normal range. Denies any dizziness or lightheaded feeling. Pain controlled with the oxycodone 10 mg. Taking once or twice daily, has to take dose at night. If does not take pain keeps him up at night. Is almost out, requests a refill to get him through to surgery post op appt. Denies any constipation or GI upset. Having normal BM, passing gas. Has been smoking cigarettes, was able to stop while hospitalized but started again upon discharge.   11/5/24 General surgery post op appt      Review of Systems   Constitutional:  Negative for chills and fever.   Respiratory:  Negative for shortness of breath and wheezing.    Cardiovascular:  Negative for palpitations and leg swelling.   Gastrointestinal:  Negative for abdominal pain, constipation, diarrhea, nausea and vomiting.   Neurological:  Negative for dizziness, light-headedness and headaches.       /58   Pulse 78   Ht 1.778 m (5' 10\")   Wt 72.6 kg (160 lb)   SpO2 99%   BMI 22.96 kg/m²     Physical Exam  Constitutional:       Appearance: Normal appearance.   Cardiovascular:      Rate and Rhythm: " Normal rate and regular rhythm.      Heart sounds: Normal heart sounds. No murmur heard.  Pulmonary:      Effort: Pulmonary effort is normal.      Breath sounds: Normal breath sounds. No wheezing, rhonchi or rales.   Abdominal:      Tenderness: There is no abdominal tenderness.      Comments: Midline incision with staples. Well approximated. Some surrounding erythema, no drainage or bleeding.    Musculoskeletal:      Right lower leg: No edema.      Left lower leg: No edema.   Neurological:      Mental Status: He is alert.         The complexity of medical decision making for this patient's transitional care is moderate.    Assessment/Plan   Problem List Items Addressed This Visit    None  Visit Diagnoses       Hospital discharge follow-up    -  Primary    Postoperative pain        Suspected chronic obstructive pulmonary disease based on initial evaluation (Multi)        Relevant Medications    albuterol (Proventil HFA) 90 mcg/actuation inhaler    Status post splenectomy              Hospital records reviewed and discussed.   Medication list reviewed and updated as needed.   No acute concerns.   Refill oxycodone. Any further refills/pain management per surgery.   Medication indications, use and potential side effects discussed in detail. I have personally reviewed the OARRS report.  I have considered the risks of abuse, dependence, addiction and diversion.  I believe that it is clinically appropriate for patient to be prescribed this medication.   Will continue blood pressure medication same way. Continue home BP monitoring. To report any lower blood pressures and/or if becomes symptomatic with low BP.   He will return for second dose of Bexsero meningitis B vaccine 8 weeks after first vaccine.

## 2024-10-30 ENCOUNTER — PATIENT OUTREACH (OUTPATIENT)
Dept: PRIMARY CARE | Facility: CLINIC | Age: 69
End: 2024-10-30
Payer: MEDICARE

## 2024-11-05 ENCOUNTER — APPOINTMENT (OUTPATIENT)
Dept: SURGERY | Facility: CLINIC | Age: 69
End: 2024-11-05

## 2024-11-05 VITALS
HEART RATE: 105 BPM | HEIGHT: 70 IN | RESPIRATION RATE: 16 BRPM | BODY MASS INDEX: 23.48 KG/M2 | DIASTOLIC BLOOD PRESSURE: 77 MMHG | SYSTOLIC BLOOD PRESSURE: 156 MMHG | WEIGHT: 164 LBS

## 2024-11-05 DIAGNOSIS — Z71.9 HEALTH EDUCATION: ICD-10-CM

## 2024-11-05 DIAGNOSIS — Z90.81 S/P SPLENECTOMY: Primary | ICD-10-CM

## 2024-11-05 PROCEDURE — 99024 POSTOP FOLLOW-UP VISIT: CPT | Performed by: PHYSICIAN ASSISTANT

## 2024-11-05 ASSESSMENT — ENCOUNTER SYMPTOMS
PALPITATIONS: 0
CHILLS: 0
DIARRHEA: 0
CONSTIPATION: 0
HEADACHES: 0
ABDOMINAL PAIN: 0
DIZZINESS: 0
NAUSEA: 0
FEVER: 0
WHEEZING: 0
SHORTNESS OF BREATH: 0
VOMITING: 0
LIGHT-HEADEDNESS: 0

## 2024-11-05 ASSESSMENT — PAIN SCALES - GENERAL: PAINLEVEL_OUTOF10: 2

## 2024-11-14 ENCOUNTER — PATIENT OUTREACH (OUTPATIENT)
Dept: PRIMARY CARE | Facility: CLINIC | Age: 69
End: 2024-11-14

## 2024-11-14 ENCOUNTER — APPOINTMENT (OUTPATIENT)
Dept: NEPHROLOGY | Facility: CLINIC | Age: 69
End: 2024-11-14
Payer: MEDICARE

## 2024-11-14 NOTE — PROGRESS NOTES
Unable to reach patient for  F/U call     No V/M     If no voicemail available call attempts x 2 were made to contact the patient to assist with any questions or concerns patient may have.

## 2024-11-24 DIAGNOSIS — E78.2 MIXED HYPERLIPIDEMIA: ICD-10-CM

## 2024-11-24 DIAGNOSIS — I10 PRIMARY HYPERTENSION: ICD-10-CM

## 2024-11-25 RX ORDER — LOSARTAN POTASSIUM 100 MG/1
100 TABLET ORAL DAILY
Qty: 90 TABLET | Refills: 2 | Status: SHIPPED | OUTPATIENT
Start: 2024-11-25

## 2024-11-25 RX ORDER — ATORVASTATIN CALCIUM 20 MG/1
20 TABLET, FILM COATED ORAL DAILY
Qty: 90 TABLET | Refills: 2 | Status: SHIPPED | OUTPATIENT
Start: 2024-11-25

## 2024-11-25 RX ORDER — AMLODIPINE BESYLATE 10 MG/1
10 TABLET ORAL DAILY
Qty: 90 TABLET | Refills: 2 | Status: SHIPPED | OUTPATIENT
Start: 2024-11-25

## 2024-11-25 RX ORDER — CHLORTHALIDONE 25 MG/1
25 TABLET ORAL DAILY
Qty: 90 TABLET | Refills: 2 | Status: SHIPPED | OUTPATIENT
Start: 2024-11-25

## 2024-12-18 ENCOUNTER — APPOINTMENT (OUTPATIENT)
Dept: PRIMARY CARE | Facility: CLINIC | Age: 69
End: 2024-12-18
Payer: MEDICARE

## 2024-12-18 VITALS
TEMPERATURE: 98.5 F | DIASTOLIC BLOOD PRESSURE: 70 MMHG | SYSTOLIC BLOOD PRESSURE: 132 MMHG | OXYGEN SATURATION: 97 % | HEART RATE: 65 BPM

## 2024-12-18 PROCEDURE — 90471 IMMUNIZATION ADMIN: CPT | Performed by: STUDENT IN AN ORGANIZED HEALTH CARE EDUCATION/TRAINING PROGRAM

## 2024-12-18 PROCEDURE — 90620 MENB-4C VACCINE IM: CPT | Performed by: STUDENT IN AN ORGANIZED HEALTH CARE EDUCATION/TRAINING PROGRAM

## 2024-12-18 NOTE — PROGRESS NOTES
Subjective   Patient ID: Facundo Youngblood is a 69 y.o. male who presents for Flu Vaccine (Bexsero /Verbal consent given ).    HPI     Review of Systems    Objective   /70   Pulse 65   Temp 36.9 °C (98.5 °F)   SpO2 97%     Physical Exam    Assessment/Plan

## 2025-01-13 ENCOUNTER — TELEPHONE (OUTPATIENT)
Dept: PRIMARY CARE | Facility: CLINIC | Age: 70
End: 2025-01-13

## 2025-01-13 ENCOUNTER — APPOINTMENT (OUTPATIENT)
Dept: PRIMARY CARE | Facility: CLINIC | Age: 70
End: 2025-01-13
Payer: COMMERCIAL

## 2025-01-13 VITALS
WEIGHT: 161 LBS | HEIGHT: 70 IN | BODY MASS INDEX: 23.05 KG/M2 | HEART RATE: 82 BPM | DIASTOLIC BLOOD PRESSURE: 64 MMHG | SYSTOLIC BLOOD PRESSURE: 136 MMHG | TEMPERATURE: 98 F | OXYGEN SATURATION: 97 %

## 2025-01-13 DIAGNOSIS — Z00.00 ROUTINE GENERAL MEDICAL EXAMINATION AT HEALTH CARE FACILITY: Primary | ICD-10-CM

## 2025-01-13 DIAGNOSIS — N18.32 STAGE 3B CHRONIC KIDNEY DISEASE (MULTI): ICD-10-CM

## 2025-01-13 DIAGNOSIS — Z12.5 PROSTATE CANCER SCREENING: ICD-10-CM

## 2025-01-13 DIAGNOSIS — I10 PRIMARY HYPERTENSION: ICD-10-CM

## 2025-01-13 DIAGNOSIS — E78.2 MIXED HYPERLIPIDEMIA: ICD-10-CM

## 2025-01-13 DIAGNOSIS — Z86.79 S/P AAA REPAIR: ICD-10-CM

## 2025-01-13 DIAGNOSIS — R73.01 IMPAIRED FASTING GLUCOSE: ICD-10-CM

## 2025-01-13 DIAGNOSIS — Z98.890 S/P AAA REPAIR: ICD-10-CM

## 2025-01-13 DIAGNOSIS — J43.9 PULMONARY EMPHYSEMA, UNSPECIFIED EMPHYSEMA TYPE (MULTI): ICD-10-CM

## 2025-01-13 PROCEDURE — G0439 PPPS, SUBSEQ VISIT: HCPCS | Performed by: STUDENT IN AN ORGANIZED HEALTH CARE EDUCATION/TRAINING PROGRAM

## 2025-01-13 PROCEDURE — 1170F FXNL STATUS ASSESSED: CPT | Performed by: STUDENT IN AN ORGANIZED HEALTH CARE EDUCATION/TRAINING PROGRAM

## 2025-01-13 PROCEDURE — 1159F MED LIST DOCD IN RCRD: CPT | Performed by: STUDENT IN AN ORGANIZED HEALTH CARE EDUCATION/TRAINING PROGRAM

## 2025-01-13 PROCEDURE — 3075F SYST BP GE 130 - 139MM HG: CPT | Performed by: STUDENT IN AN ORGANIZED HEALTH CARE EDUCATION/TRAINING PROGRAM

## 2025-01-13 PROCEDURE — 3008F BODY MASS INDEX DOCD: CPT | Performed by: STUDENT IN AN ORGANIZED HEALTH CARE EDUCATION/TRAINING PROGRAM

## 2025-01-13 PROCEDURE — 3078F DIAST BP <80 MM HG: CPT | Performed by: STUDENT IN AN ORGANIZED HEALTH CARE EDUCATION/TRAINING PROGRAM

## 2025-01-13 PROCEDURE — 99214 OFFICE O/P EST MOD 30 MIN: CPT | Performed by: STUDENT IN AN ORGANIZED HEALTH CARE EDUCATION/TRAINING PROGRAM

## 2025-01-13 PROCEDURE — 1123F ACP DISCUSS/DSCN MKR DOCD: CPT | Performed by: STUDENT IN AN ORGANIZED HEALTH CARE EDUCATION/TRAINING PROGRAM

## 2025-01-13 ASSESSMENT — PATIENT HEALTH QUESTIONNAIRE - PHQ9
1. LITTLE INTEREST OR PLEASURE IN DOING THINGS: NOT AT ALL
2. FEELING DOWN, DEPRESSED OR HOPELESS: NOT AT ALL
SUM OF ALL RESPONSES TO PHQ9 QUESTIONS 1 AND 2: 0

## 2025-01-13 ASSESSMENT — ACTIVITIES OF DAILY LIVING (ADL)
DOING_HOUSEWORK: INDEPENDENT
DRESSING: INDEPENDENT
MANAGING_FINANCES: INDEPENDENT
BATHING: INDEPENDENT
TAKING_MEDICATION: INDEPENDENT
GROCERY_SHOPPING: INDEPENDENT

## 2025-01-13 NOTE — PROGRESS NOTES
Subjective   Reason for Visit: Facundo Youngblood is an 69 y.o. male here for a Medicare Wellness visit.     Past Medical, Surgical, and Family History reviewed and updated in chart.    Reviewed all medications by prescribing practitioner or clinical pharmacist (such as prescriptions, OTCs, herbal therapies and supplements) and documented in the medical record.    No pre-appt labs available for review     INTERVAL HX/CURRENT CONCERNS:  None    CHRONIC CONDITIONS:  HTN - losartan 100mg, amlodipine 10mg, chlorthalidone 25mg. Normal readings when checks at home.   HLD - atorvastatin 20mg  CKD3 - farxiga 10 mg. Stable renal function on last labs. Under nephrology  IFG - A1c   Current smoker   Lung nodules  COPD - declines inhalers  RAJAT - s/p stenting  Thoracic aortic aneurysm - s/p endovascular repair with Dr. Desir 2023. Now under Dr. 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 Gonzales --- Needs to schedule - wants [facility] see [facility] to discuss procedure.   Lung cancer screenin2024 - stable nodules  PSA - await labs     Cigars - swishers 2 a day, cigarettes 2PPD from age 17 to , still smoking  EtOH - none   Drugs - none        Patient Care Team:  Isabel Chiu MD as PCP - General (Family Medicine)     Review of Systems   Constitutional:  Negative for chills and fever.   HENT:  Negative for trouble swallowing.    Eyes:  Negative for visual disturbance.   Respiratory:  Negative for cough, shortness of breath and wheezing.    Cardiovascular:  Negative for chest pain and palpitations.   Gastrointestinal:  Negative  "for abdominal pain, blood in stool, constipation and diarrhea.   Genitourinary:  Negative for difficulty urinating and testicular pain.   Musculoskeletal:  Negative for arthralgias, back pain and joint swelling.   Skin:  Negative for rash.   Neurological:  Negative for dizziness, light-headedness and headaches.   Psychiatric/Behavioral:  Negative for dysphoric mood. The patient is not nervous/anxious.        Objective   Vitals:  /64   Pulse 82   Temp 36.7 °C (98 °F)   Ht 1.778 m (5' 10\")   Wt 73 kg (161 lb)   SpO2 97%   BMI 23.10 kg/m²       Physical Exam  Vitals and nursing note reviewed.   Constitutional:       Appearance: Normal appearance.   HENT:      Head: Normocephalic and atraumatic.      Right Ear: Tympanic membrane, ear canal and external ear normal.      Left Ear: Tympanic membrane, ear canal and external ear normal.      Mouth/Throat:      Mouth: Mucous membranes are moist.      Pharynx: Oropharynx is clear.   Eyes:      Extraocular Movements: Extraocular movements intact.      Conjunctiva/sclera: Conjunctivae normal.      Pupils: Pupils are equal, round, and reactive to light.   Cardiovascular:      Rate and Rhythm: Normal rate and regular rhythm.      Heart sounds: Normal heart sounds.   Pulmonary:      Effort: Pulmonary effort is normal.      Breath sounds: Normal breath sounds. No wheezing, rhonchi or rales.   Abdominal:      General: Abdomen is flat.      Palpations: Abdomen is soft.      Tenderness: There is no abdominal tenderness.   Musculoskeletal:         General: Normal range of motion.      Cervical back: Normal range of motion and neck supple.   Skin:     General: Skin is warm and dry.   Neurological:      Mental Status: He is alert.   Psychiatric:         Mood and Affect: Mood normal.         Behavior: Behavior normal.         Assessment & Plan  Stage 3b chronic kidney disease (Multi)    Orders:    CBC and Auto Differential; Future    Comprehensive Metabolic Panel; " Future    Pulmonary emphysema, unspecified emphysema type (Multi)         1. Routine general medical examination at health care facility (Primary)  Well adult exam.  1. Age appropriate preventative measures reviewed.   2. Encouraged healthy diet and exercise.  3. Immunizations- Reviewed and discussed  4. Labs- to be drawn   5. Medications- Reviewed  - 1 Year Follow Up In Primary Care - Wellness Exam; Future    2. Mixed hyperlipidemia  Update lipid panel. Continue medication. Recommend reduction in saturated fat and total caloric intake. Regular exercise.  - Lipid Panel; Future    3. Stage 3b chronic kidney disease (Multi)  - CBC and Auto Differential; Future  - Comprehensive Metabolic Panel; Future    4. Impaired fasting glucose  Update labs. Healthy diet, regular exercise  - Hemoglobin A1C; Future    5. S/P AAA repair  - Referral to Vascular Surgery; Future    6. Prostate cancer screening  - Prostate Specific Antigen, Screen; Future    8. Pulmonary emphysema, unspecified emphysema type (Multi)  Clear lungs on exam. Denies respiratory symptoms. Declines TRINIDAD for prn use.   Smoking cessation again advised. No interest quitting but has cut back a bit. Risk of continued tobacco use discussed.

## 2025-01-26 PROBLEM — R06.2 WHEEZE: Status: RESOLVED | Noted: 2018-12-31 | Resolved: 2025-01-26

## 2025-01-26 PROBLEM — S01.80XA: Status: RESOLVED | Noted: 2020-05-15 | Resolved: 2025-01-26

## 2025-01-26 PROBLEM — D72.829 LEUKOCYTOSIS: Status: RESOLVED | Noted: 2020-07-31 | Resolved: 2025-01-26

## 2025-01-26 PROBLEM — Z85.118 PERSONAL HISTORY OF LUNG CANCER: Status: RESOLVED | Noted: 2021-05-12 | Resolved: 2025-01-26

## 2025-01-26 PROBLEM — N18.32 STAGE 3B CHRONIC KIDNEY DISEASE (MULTI): Status: ACTIVE | Noted: 2025-01-26

## 2025-01-26 PROBLEM — L08.9 INFECTION OF SCALP: Status: RESOLVED | Noted: 2023-10-05 | Resolved: 2025-01-26

## 2025-01-26 PROBLEM — E88.89: Status: RESOLVED | Noted: 2020-10-12 | Resolved: 2025-01-26

## 2025-01-26 PROBLEM — S00.81XA ABRASION OF FACE: Status: RESOLVED | Noted: 2020-05-22 | Resolved: 2025-01-26

## 2025-01-26 ASSESSMENT — ENCOUNTER SYMPTOMS
ABDOMINAL PAIN: 0
FEVER: 0
ARTHRALGIAS: 0
DIZZINESS: 0
NERVOUS/ANXIOUS: 0
BACK PAIN: 0
PALPITATIONS: 0
DIARRHEA: 0
CHILLS: 0
LIGHT-HEADEDNESS: 0
COUGH: 0
TROUBLE SWALLOWING: 0
JOINT SWELLING: 0
HEADACHES: 0
WHEEZING: 0
SHORTNESS OF BREATH: 0
DIFFICULTY URINATING: 0
CONSTIPATION: 0
BLOOD IN STOOL: 0
DYSPHORIC MOOD: 0

## 2025-02-03 NOTE — ED PROVIDER NOTES
CC: Hypotension     HPI:  Patient is a 69-year-old male with a past medical history of stage IIIb CKD, htn, hld, RAJAT s/p stenting, and thoracic aortic aneurysm s/p repair who presents to the ED as a transfer from OSH for surgical evaluation of splenic hematoma. Patient noted received 2 units of PRBCs at OSH. Patient noted to have 4 days of flulike symptoms with nausea, vomiting, diarrhea, hot flashes, and cold sweats. Patient noted to have sharp left-sided abdominal pain 1 hour before ED presentation. At OSH, CTAP significant for large splenic subscapular hematoma.  Patient not noted to be on anticoagulation.  Patient noted to be hypotensive and administered 2 units of PRBCs.  Patient administered morphine and ketamine for pain (ketamine x2 10 mg en route via [facility]).  Patient administered ceftriaxone and Flagyl.  Upon ED presentation, patient was noted to be administered additional dose of ketamine for pain. Patient noted to have a hemoglobin drop of 12.1-9.6 at OSH.  Patient noted to be hypotensive en route per CCT, as low as 60s SBP. Patient was actively receiving 2nd unit of blood.  Patient noted feeling significantly weak with significant abdominal pain.    Limitations to history: None  Independent historian(s): Patient, EMS  Records Reviewed: Recent available ED and inpatient notes reviewed in EMR.    PMHx/PSHx:  Per HPI.   - has no past medical history on file.  - has a past surgical history that includes CT angio abdomen pelvis w and or wo IV IV contrast (12/27/2022) and CT angio abdomen pelvis w and or wo IV IV contrast (3/10/2023).    Medications:  Reviewed in EMR. See EMR for complete list of medications and doses.    Allergies:  Penicillins and Ibuprofen    Social History:  - Tobacco:  reports that he has been smoking cigars. He has never used smokeless tobacco.   - Alcohol:  reports current alcohol use.   - Illicit Drugs:  reports no history of drug use.     ROS:  Per HPI.  Regarding: [place] Male 15 right kne pain pain level 8  ----- Message from Ramonita DORSEY sent at 2/3/2025 11:26 AM CST -----  Patient Name: Fritz Julian    Specialist or PCP Name: Beverley Peña    Symptoms: pain in right knee     Pregnant (females aged 13-60. If Yes, how long?) : n/a    Call Back # : [phone]    Which State are you currently located in?: [place]    Name of Clinic Site / Acct# : [address]    Call arrived during: Work Hours           ???????????????????????????????????????????????????????????????  Triage Vitals:  T    HR 73  BP 81/52  RR (!) 22  O2 96 %      Physical Exam  Vitals and nursing note reviewed.   Constitutional:       General: He is in acute distress.      Appearance: He is ill-appearing.   HENT:      Head: Normocephalic and atraumatic.      Nose: Nose normal.      Mouth/Throat:      Mouth: Mucous membranes are moist.   Eyes:      Conjunctiva/sclera: Conjunctivae normal.   Cardiovascular:      Rate and Rhythm: Normal rate and regular rhythm.      Pulses: Normal pulses.   Pulmonary:      Effort: Respiratory distress present.      Comments: Dyspneic and tachypneic.  Satting mid 90s on a nonrebreather mask.  Abdominal:      Tenderness: There is abdominal tenderness.      Comments: Rigid and significantly tender abdomen   Skin:     General: Skin is warm.   Neurological:      General: No focal deficit present.       ???????????????????????????????????????????????????????????????  Labs:   Labs Reviewed   BLOOD GAS ARTERIAL   PREPARE RBC        Imaging:   XR chest 1 view    (Results Pending)        MDM:  Patient is a 69-year-old male with a past medical history of stage IIIb CKD, htn, hld, RAJTA s/p stenting, and thoracic aortic aneurysm s/p repair who presents to the ED as a transfer from OSH for surgical evaluation of splenic hematoma.  Patient presented hypotensive 60s SBP for CCT, 81/52 for us and tachypneic satting mid 90s on nonrebreather.  Physical exam finding significant for an ill-appearing male does not to be dyspneic, tachypneic, and rigid and significantly tender abdomen.  FAST exam positive for free fluid in the right upper quadrant. ACS emergently consulted. They noted they were prepping the patient for the OR.  MTP activated.  Patient noted that she received a total of 7 units PRBCs, 5 units FFP, and a unit of platelets were hung.  Calcium administered with MTP.  Patient initiated on levo for hypotension.  Analgesia  administered. Given significant work of breathing with patient maxed out nonrebreather mask and anticipated clinical course, patient was intubated.  Please see procedure note for further details.  Propofol for postintubation sedation.  CXR displayed tube overlying the guerda.  Patient noted be ready for surgery.  Patient to the OR.    ED Course:  Diagnoses as of 10/18/24 0113   Spleen hematoma, initial encounter   Hemorrhagic shock (Multi)       Social Determinants Limiting Care:  None identified    Disposition:  Send to OR    William Braun MD   Emergency Medicine PGY-3  [facility]    Comment: Please note this report has been produced using speech recognition software and may contain errors related to that system including errors in grammar, punctuation, and spelling as well as words and phrases that may be inappropriate.  If there are any questions or concerns please feel free to contact the dictating provider for clarification.    Procedures ? SmartLinks last updated 10/16/2024 7:14 PM        William Braun MD  Resident  10/18/24 0114       Lul Barragan MD  10/18/24 0458

## 2025-02-05 ENCOUNTER — OFFICE VISIT (OUTPATIENT)
Dept: VASCULAR SURGERY | Facility: CLINIC | Age: 70
End: 2025-02-05
Payer: MEDICARE

## 2025-02-05 VITALS
WEIGHT: 161 LBS | DIASTOLIC BLOOD PRESSURE: 58 MMHG | SYSTOLIC BLOOD PRESSURE: 110 MMHG | BODY MASS INDEX: 23.1 KG/M2 | HEART RATE: 95 BPM

## 2025-02-05 DIAGNOSIS — Z72.0 TOBACCO ABUSE: Primary | ICD-10-CM

## 2025-02-05 DIAGNOSIS — Z86.79 S/P AAA REPAIR: ICD-10-CM

## 2025-02-05 DIAGNOSIS — I71.43 INFRARENAL ABDOMINAL AORTIC ANEURYSM (AAA) WITHOUT RUPTURE (CMS-HCC): ICD-10-CM

## 2025-02-05 DIAGNOSIS — Z98.890 S/P AAA REPAIR: ICD-10-CM

## 2025-02-05 PROCEDURE — 3078F DIAST BP <80 MM HG: CPT | Performed by: NURSE PRACTITIONER

## 2025-02-05 PROCEDURE — 3074F SYST BP LT 130 MM HG: CPT | Performed by: NURSE PRACTITIONER

## 2025-02-05 PROCEDURE — 99213 OFFICE O/P EST LOW 20 MIN: CPT | Performed by: NURSE PRACTITIONER

## 2025-02-05 PROCEDURE — 1159F MED LIST DOCD IN RCRD: CPT | Performed by: NURSE PRACTITIONER

## 2025-02-05 PROCEDURE — 1123F ACP DISCUSS/DSCN MKR DOCD: CPT | Performed by: NURSE PRACTITIONER

## 2025-02-05 PROCEDURE — 1126F AMNT PAIN NOTED NONE PRSNT: CPT | Performed by: NURSE PRACTITIONER

## 2025-02-05 PROCEDURE — 99203 OFFICE O/P NEW LOW 30 MIN: CPT | Performed by: NURSE PRACTITIONER

## 2025-02-05 PROCEDURE — 1160F RVW MEDS BY RX/DR IN RCRD: CPT | Performed by: NURSE PRACTITIONER

## 2025-02-05 ASSESSMENT — ENCOUNTER SYMPTOMS
LOSS OF SENSATION IN FEET: 0
DEPRESSION: 0
OCCASIONAL FEELINGS OF UNSTEADINESS: 0

## 2025-02-05 ASSESSMENT — PAIN SCALES - GENERAL: PAINLEVEL_OUTOF10: 0-NO PAIN

## 2025-02-05 NOTE — PROGRESS NOTES
History Of Present Illness  Facundo Youngblood is a 69 y.o. male presenting with a past medical history of abdominal aortic aneurysm.  Patient had an endovascular abdominal aortic aneurysm repair with Dr. Desir approximately 2 years ago.  He is seeking to establish care with a new vascular surgery practice as Dr. Desir has closed his office.  He had an ultrasound in February 2024 and noted no problems with the stent graft.  However, I do not have access to Dr. Desir's records.         Past Medical History  He has a past medical history of Abrasion of face (05/22/2020), Encounter for screening for COVID-19 (01/31/2023), Exposure to toxic chemical (08/28/2013), Infection of scalp (10/05/2023), and Wound, open, forehead (05/15/2020).    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     Social History  He reports that he has been smoking cigars. He has been exposed to tobacco smoke. He has never used smokeless tobacco. He reports that he does not currently use alcohol. He reports that he does not use drugs.    Family History  Family History   Problem Relation Name Age of Onset    Cancer Mother      Diabetes Mother          Allergies  Penicillins and Ibuprofen    Review of Systems    CONSTITUTIONAL: Denies weight loss, fever and chills.    HEENT: Denies changes in vision and hearing.  Positive for nasal congestion    RESPIRATORY: Denies SOB and cough.  Positive for tobacco abuse    CV: Denies palpitations and CP.    GI: Denies abdominal pain, nausea, vomiting and diarrhea.    : Denies dysuria and urinary frequency.    MSK: Denies myalgia and joint pain.    SKIN: Denies rash and pruritus.    VASC: Denies claudication, ischemic rest pain, or open wounds or sores.    NEUROLOGICAL: Denies headache and syncope.    PSYCHIATRIC: Denies recent  changes in mood. Denies anxiety and depression.     Physical Exam    General: Pt is alert and oriented x 3. Pleasant, conversive.  Smells of tobacco smoke.  HEENT: Head is atraumatic, normocephalic.   Cardiac: Normal S1-S2.  Regular rate and rhythm.  No murmurs.  Respiratory: Lungs clear to auscultation.  No adventitious sounds.  Abdomen: Soft, nondistended, nontender.  Bowel sounds x4 quadrants.  Pulse exam: Palpable brachial and radial pulses bilaterally.   Extremities: No significant edema noted.  Extremities are warm to the touch and normal in color.  No open wounds or sores.  Neuro: Moves all extremities spontaneously.  No focal deficits.  Psych: Appropriate affect.  Answers questions appropriately.     Last Recorded Vitals  /58 Comment: manual  Pulse 95   Wt 73 kg (161 lb)     Relevant Results  Current Outpatient Medications   Medication Instructions    albuterol (Proventil HFA) 90 mcg/actuation inhaler 2 puffs, inhalation, 4 times daily RT    amLODIPine (NORVASC) 10 mg, oral, Daily    atorvastatin (LIPITOR) 20 mg, oral, Daily    chlorthalidone (HYGROTON) 25 mg, oral, Daily    Farxiga 10 mg, oral, Daily    losartan (COZAAR) 100 mg, oral, Daily            Assessment/Plan   History of abdominal aortic aneurysm repair with endovascular stent graft  Abdominal aortic aneurysm  Tobacco abuse    Patient with a history of abdominal aortic aneurysm, now status post endovascular repair 2 years ago with Dr. Desir.  Was completed at [facility].  Has not had a follow-up ultrasound in almost a year.  Recommend follow-up aortoiliac duplex to assess the residual size of the aneurysm sac and whether or not there are any endoleak.       Brando Cruz, APRN-CNP

## 2025-02-05 NOTE — PATIENT INSTRUCTIONS
Facundo,    Thank you for choosing  vascular surgery for your care!  We discussed the fact that you had an abdominal aortic aneurysm repair approximately 2 years ago by Dr. Desir.  It is important that we keep tabs on the appearance of the aneurysm repair to make sure that there are no endoleak's.  I ordered a aortoiliac ultrasound.  Please follow-up after the test.

## 2025-02-11 ENCOUNTER — HOSPITAL ENCOUNTER (OUTPATIENT)
Dept: VASCULAR MEDICINE | Facility: CLINIC | Age: 70
Discharge: HOME | End: 2025-02-11
Payer: MEDICARE

## 2025-02-11 DIAGNOSIS — Z86.79 S/P AAA REPAIR: ICD-10-CM

## 2025-02-11 DIAGNOSIS — I71.43 INFRARENAL ABDOMINAL AORTIC ANEURYSM (AAA) WITHOUT RUPTURE (CMS-HCC): ICD-10-CM

## 2025-02-11 DIAGNOSIS — Z98.890 S/P AAA REPAIR: ICD-10-CM

## 2025-02-11 PROCEDURE — 93978 VASCULAR STUDY: CPT

## 2025-02-11 PROCEDURE — 93978 VASCULAR STUDY: CPT | Performed by: INTERNAL MEDICINE

## 2025-02-12 ENCOUNTER — TELEMEDICINE (OUTPATIENT)
Dept: VASCULAR SURGERY | Facility: CLINIC | Age: 70
End: 2025-02-12
Payer: MEDICARE

## 2025-02-12 DIAGNOSIS — I71.43 INFRARENAL ABDOMINAL AORTIC ANEURYSM (AAA) WITHOUT RUPTURE (CMS-HCC): ICD-10-CM

## 2025-02-12 DIAGNOSIS — Z98.890 S/P AAA REPAIR: Primary | ICD-10-CM

## 2025-02-12 DIAGNOSIS — Z86.79 S/P AAA REPAIR: Primary | ICD-10-CM

## 2025-02-19 NOTE — PROGRESS NOTES
History Of Present Illness  Facundo Youngblood is a 69 y.o. male presenting for a virtual visit with audio capability only.  The patient was at home while I was at the [facility] facility.  Patient has a history of endovascular aneurysm repair with Dr. Desir approximately 2 years ago.  He recently had an aortic duplex and is here to discuss the results as well as next steps.  Patient takes a daily statin.     Past Medical History  He has a past medical history of Abrasion of face (05/22/2020), Encounter for screening for COVID-19 (01/31/2023), Exposure to toxic chemical (08/28/2013), Infection of scalp (10/05/2023), and Wound, open, forehead (05/15/2020).    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     Social History  He reports that he has been smoking cigars. He has been exposed to tobacco smoke. He has never used smokeless tobacco. He reports that he does not currently use alcohol. He reports that he does not use drugs.    Family History  Family History   Problem Relation Name Age of Onset    Cancer Mother      Diabetes Mother          Allergies  Penicillins and Ibuprofen    Review of Systems    CONSTITUTIONAL: Denies weight loss, fever and chills.     HEENT: Denies changes in vision and hearing.  Positive for nasal congestion     RESPIRATORY: Denies SOB and cough.  Positive for tobacco abuse     CV: Denies palpitations and CP.     GI: Denies abdominal pain, nausea, vomiting and diarrhea.     : Denies dysuria and urinary frequency.     MSK: Denies myalgia and joint pain.     SKIN: Denies rash and pruritus.     VASC: Denies claudication, ischemic rest pain, or open wounds or sores.     NEUROLOGICAL: Denies headache and syncope.     PSYCHIATRIC: Denies recent changes in mood. Denies anxiety and depression     Physical  Exam    Physical exam completed as this was a virtual visit     Last Recorded Vitals  There were no vitals taken for this visit.    Relevant Results  Current Outpatient Medications   Medication Instructions    albuterol (Proventil HFA) 90 mcg/actuation inhaler 2 puffs, inhalation, 4 times daily RT    amLODIPine (NORVASC) 10 mg, oral, Daily    atorvastatin (LIPITOR) 20 mg, oral, Daily    chlorthalidone (HYGROTON) 25 mg, oral, Daily    Farxiga 10 mg, oral, Daily    losartan (COZAAR) 100 mg, oral, Daily           Assessment/Plan   History of abdominal aortic aneurysm repair with endovascular stent graft  Abdominal aortic aneurysm  Tobacco abuse    I reviewed the results of the patient's aortic duplex which was completed on February 12, 2025.  The residual aneurysm sac measures 4.65 x 5.03.  Compared to CT angiogram from 2023, the residual aneurysm sac had a maximal diameter of 5.1 cm with no evidence of endoleak.  I would recommend follow-up in 1 year for reevaluation which will include a previsit aortic duplex.    Advised tobacco cessation           Brando Cruz, APRN-CNP

## 2025-04-22 ENCOUNTER — APPOINTMENT (OUTPATIENT)
Dept: NEPHROLOGY | Facility: CLINIC | Age: 70
End: 2025-04-22
Payer: MEDICARE

## 2025-05-02 ENCOUNTER — OFFICE VISIT (OUTPATIENT)
Dept: PRIMARY CARE | Facility: CLINIC | Age: 70
End: 2025-05-02
Payer: MEDICARE

## 2025-05-02 VITALS
WEIGHT: 161 LBS | DIASTOLIC BLOOD PRESSURE: 68 MMHG | OXYGEN SATURATION: 97 % | BODY MASS INDEX: 23.05 KG/M2 | SYSTOLIC BLOOD PRESSURE: 142 MMHG | HEIGHT: 70 IN | HEART RATE: 77 BPM

## 2025-05-02 DIAGNOSIS — F17.290 CIGAR SMOKER: ICD-10-CM

## 2025-05-02 DIAGNOSIS — H69.92 DYSFUNCTION OF LEFT EUSTACHIAN TUBE: Primary | ICD-10-CM

## 2025-05-02 DIAGNOSIS — R06.00 PND (PAROXYSMAL NOCTURNAL DYSPNEA): ICD-10-CM

## 2025-05-02 DIAGNOSIS — F17.200 TOBACCO DEPENDENCY: ICD-10-CM

## 2025-05-02 DIAGNOSIS — R09.82 PND (POST-NASAL DRIP): ICD-10-CM

## 2025-05-02 DIAGNOSIS — R91.8 LUNG NODULES: ICD-10-CM

## 2025-05-02 DIAGNOSIS — D12.6 COLON ADENOMA: ICD-10-CM

## 2025-05-02 PROCEDURE — 99214 OFFICE O/P EST MOD 30 MIN: CPT | Performed by: STUDENT IN AN ORGANIZED HEALTH CARE EDUCATION/TRAINING PROGRAM

## 2025-05-02 PROCEDURE — 1125F AMNT PAIN NOTED PAIN PRSNT: CPT | Performed by: STUDENT IN AN ORGANIZED HEALTH CARE EDUCATION/TRAINING PROGRAM

## 2025-05-02 PROCEDURE — G2211 COMPLEX E/M VISIT ADD ON: HCPCS | Performed by: STUDENT IN AN ORGANIZED HEALTH CARE EDUCATION/TRAINING PROGRAM

## 2025-05-02 PROCEDURE — 1159F MED LIST DOCD IN RCRD: CPT | Performed by: STUDENT IN AN ORGANIZED HEALTH CARE EDUCATION/TRAINING PROGRAM

## 2025-05-02 PROCEDURE — 3008F BODY MASS INDEX DOCD: CPT | Performed by: STUDENT IN AN ORGANIZED HEALTH CARE EDUCATION/TRAINING PROGRAM

## 2025-05-02 PROCEDURE — 99406 BEHAV CHNG SMOKING 3-10 MIN: CPT | Performed by: STUDENT IN AN ORGANIZED HEALTH CARE EDUCATION/TRAINING PROGRAM

## 2025-05-02 PROCEDURE — 3078F DIAST BP <80 MM HG: CPT | Performed by: STUDENT IN AN ORGANIZED HEALTH CARE EDUCATION/TRAINING PROGRAM

## 2025-05-02 PROCEDURE — 3077F SYST BP >= 140 MM HG: CPT | Performed by: STUDENT IN AN ORGANIZED HEALTH CARE EDUCATION/TRAINING PROGRAM

## 2025-05-02 RX ORDER — FLUTICASONE PROPIONATE 50 MCG
1 SPRAY, SUSPENSION (ML) NASAL DAILY
Qty: 16 G | Refills: 1 | Status: SHIPPED | OUTPATIENT
Start: 2025-05-02 | End: 2026-05-02

## 2025-05-02 ASSESSMENT — PAIN SCALES - GENERAL: PAINLEVEL_OUTOF10: 5

## 2025-05-02 NOTE — PATIENT INSTRUCTIONS
I have referred you to a physician at [facility] ([facility]).  This physician group does NOT schedule through Epic/ Ide[employer].  It is very important that you call their office in the next 48 hours to schedule an appointment as you cannot do this online.  The scheduling number for this practice is [phone].

## 2025-05-02 NOTE — PROGRESS NOTES
"                                                                                                                 GENERAL PROGRESS NOTE    Chief Complaint   Patient presents with    Earache     Left ear, plugged, some drainage, says sound like he has water in ear. Had for a few months.        HPI  Facundo Youngblood is a 69 y.o. male ***that presents today with complaint of Lt ear pressure. Started to become a bother about 3 mos ago. Feels blocked and at times feels liek there is water. Has tried OTC cerumenolytics     Vital signs   /68 (BP Location: Left arm, Patient Position: Sitting, BP Cuff Size: Adult)   Pulse 77   Ht 1.778 m (5' 10\")   Wt 73 kg (161 lb)   SpO2 97%   BMI 23.10 kg/m²      Current Medications[1]    Review of Systems     Physical Exam    {Assess/Plan SmartLinks (Optional) - DX aware test:51431}  ***    No orders of the defined types were placed in this encounter.   CT chest for lung nodule surveillance   Flonase for ETD  GI due for colon    Isabel Chiu MD  05/02/25  9:39 AM         [1]   Current Outpatient Medications   Medication Sig Dispense Refill    albuterol (Proventil HFA) 90 mcg/actuation inhaler Inhale 2 puffs 4 times a day. 18 g 2    amLODIPine (Norvasc) 10 mg tablet TAKE 1 TABLET(10 MG) BY MOUTH DAILY 90 tablet 2    atorvastatin (Lipitor) 20 mg tablet TAKE 1 TABLET BY MOUTH DAILY 90 tablet 2    chlorthalidone (Hygroton) 25 mg tablet TAKE 1 TABLET BY MOUTH DAILY 90 tablet 2    Farxiga 10 mg Take 1 tablet (10 mg) by mouth once daily. 90 tablet 3    losartan (Cozaar) 100 mg tablet TAKE 1 TABLET BY MOUTH DAILY 90 tablet 2     No current facility-administered medications for this visit.     " Follow up 4 weeks if no improvement  Orders:    fluticasone (Flonase) 50 mcg/actuation nasal spray; Administer 1 spray into each nostril once daily. Shake gently. Before first use, prime pump. After use, clean tip and replace cap.    PND (post-nasal drip)    Orders:    fluticasone (Flonase) 50 mcg/actuation nasal spray; Administer 1 spray into each nostril once daily. Shake gently. Before first use, prime pump. After use, clean tip and replace cap.    Colon adenoma  Due for colonoscopy. Referral placed.   Orders:    Referral to Gastroenterology; Future    Lung nodules    Orders:    fluticasone (Flonase) 50 mcg/actuation nasal spray; Administer 1 spray into each nostril once daily. Shake gently. Before first use, prime pump. After use, clean tip and replace cap.    CT chest wo IV contrast; Future    Cigar smoker  Due for LDCT, last done over 1 yr ago with stable nodules. CT ordered. He will schedule. Smoking cessation strongly advised. Risk discussed. Time spent on tobacco cessation counseling -  5 min  Orders:    CT chest wo IV contrast; Future    Tobacco dependency               Orders Placed This Encounter   Procedures    CT chest wo IV contrast     Standing Status:   Future     Expected Date:   5/2/2025     Expiration Date:   5/2/2026     Reason for exam::   lung nodule     Radiologist to Determine Optimal Study:   Yes     Release result to Natero:   Immediate [1]     Is this exam part of a Research Study? If Yes, link this order to the research study:   No     May utilize  for port access if port present for this exam.:   Yes    Referral to Gastroenterology     Standing Status:   Future     Expected Date:   5/2/2025     Expiration Date:   5/2/2026     Referral Priority:   Routine     Referral Type:   Consultation     Referral Reason:   Specialty Services Required     Referred to Provider:   Joe Guzmán MD     Requested Specialty:   Gastroenterology     Number of Visits Requested:   1       Isabel GARNETT  MD Aram  05/18/25  4:00 PM           [1]   Current Outpatient Medications   Medication Sig Dispense Refill    albuterol (Proventil HFA) 90 mcg/actuation inhaler Inhale 2 puffs 4 times a day. (Patient taking differently: Inhale 2 puffs every 4 hours if needed for shortness of breath.) 18 g 2    amLODIPine (Norvasc) 10 mg tablet TAKE 1 TABLET(10 MG) BY MOUTH DAILY 90 tablet 2    atorvastatin (Lipitor) 20 mg tablet TAKE 1 TABLET BY MOUTH DAILY 90 tablet 2    chlorthalidone (Hygroton) 25 mg tablet TAKE 1 TABLET BY MOUTH DAILY 90 tablet 2    losartan (Cozaar) 100 mg tablet TAKE 1 TABLET BY MOUTH DAILY 90 tablet 2    empagliflozin (Jardiance) 10 mg tablet Take 1 tablet (10 mg) by mouth once daily. 30 tablet 11    fluticasone (Flonase) 50 mcg/actuation nasal spray Administer 1 spray into each nostril once daily. Shake gently. Before first use, prime pump. After use, clean tip and replace cap. 16 g 1     No current facility-administered medications for this visit.

## 2025-05-08 ENCOUNTER — APPOINTMENT (OUTPATIENT)
Dept: NEPHROLOGY | Facility: CLINIC | Age: 70
End: 2025-05-08
Payer: COMMERCIAL

## 2025-05-08 VITALS
BODY MASS INDEX: 23.05 KG/M2 | TEMPERATURE: 97.5 F | HEART RATE: 73 BPM | SYSTOLIC BLOOD PRESSURE: 136 MMHG | HEIGHT: 70 IN | DIASTOLIC BLOOD PRESSURE: 73 MMHG | OXYGEN SATURATION: 99 % | WEIGHT: 161 LBS

## 2025-05-08 DIAGNOSIS — I12.9 HYPERTENSIVE CHRONIC KIDNEY DISEASE WITH STAGE 1 THROUGH STAGE 4 CHRONIC KIDNEY DISEASE, OR UNSPECIFIED CHRONIC KIDNEY DISEASE: ICD-10-CM

## 2025-05-08 DIAGNOSIS — I70.1 RENAL ARTERY STENOSIS (CMS-HCC): ICD-10-CM

## 2025-05-08 DIAGNOSIS — N18.32 STAGE 3B CHRONIC KIDNEY DISEASE (MULTI): Primary | ICD-10-CM

## 2025-05-08 DIAGNOSIS — I25.10 CARDIOVASCULAR DISEASE: ICD-10-CM

## 2025-05-08 DIAGNOSIS — I71.30 RUPTURED ABDOMINAL AORTIC ANEURYSM (AAA), UNSPECIFIED PART (MULTI): ICD-10-CM

## 2025-05-08 PROCEDURE — 3078F DIAST BP <80 MM HG: CPT | Performed by: INTERNAL MEDICINE

## 2025-05-08 PROCEDURE — 99215 OFFICE O/P EST HI 40 MIN: CPT | Performed by: INTERNAL MEDICINE

## 2025-05-08 PROCEDURE — 3008F BODY MASS INDEX DOCD: CPT | Performed by: INTERNAL MEDICINE

## 2025-05-08 PROCEDURE — 1159F MED LIST DOCD IN RCRD: CPT | Performed by: INTERNAL MEDICINE

## 2025-05-08 PROCEDURE — G2211 COMPLEX E/M VISIT ADD ON: HCPCS | Performed by: INTERNAL MEDICINE

## 2025-05-08 PROCEDURE — 3075F SYST BP GE 130 - 139MM HG: CPT | Performed by: INTERNAL MEDICINE

## 2025-05-08 NOTE — PROGRESS NOTES
Subjective   Facundo Youngblood is a 69 y.o. male with a PMH of HTN, HLD, AAA, renal artery stenosis s/p repair, and lung nodules, recent ruptured spleen-came in for a CKD follow up visit.    Last office visit October 2024.  Facundo came alone today.  He had spontaneous clinic rupture 6 months ago-received blood transfusion status post splenectomy.  Today, he is healing well.  He stopped Farxiga due to affordability.  He thinks Farxiga might have caused his ruptured spleen-discussed lack of evidence linking both.  He did not get blood work since October 2024.  Discussed getting blood work done today.  No urinary tract symptoms.  No leg swelling or shortness of breath.  Blood pressure accepted 133/71 with no orthostatic hypotension.  No tachycardia.  Today I did discuss benefits of SGLT2 inhibitors.  He is willing to give it a try.  Will trial Jardiance if it is more affordable    Prior notes  Patient feels well overall he denies any acute concerns or complaints.  Blood pressure is acceptable today.  Kidney function is relatively stable with creatinine of 2.09 and GFR of 34 this month with baseline.  No electrolyte imbalance.  No acidosis.  No proteinuria.  He continues to smoke (cigar) and previously been exposed to asbestosis/coal we discussed smoking cessation benefits and offered help.  He been feeling very well since he started Farxiga.  He started with 5 mg and went up to 10 mg daily.  He said when he started 10 mg he felt little woozy but cut down back to 5 mg and went back up to 10mg but now he is feeling fine and better on 10 mg.  Otherwise he reports good urine output.  We discussed his renal artery stents and mentioned that he should check with his vascular team if he should be on on antiplatelet therapy including aspirin.  He is on statin therapy.      Per Prior Note     Facundo Youngblood, a 68 y/o PMH of HTN, HLD, AAA, renal artery stenosis s/p repair, and lung nodules who is here for a new visit to follow  up with CKD stage 3. February 2023 had AAA repair therapy, and 4 years ago had stented both renal arteries. 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 Denies any urinary symptoms at this time. Brother in law has CKD stage 5. He smokes 2 cigars a day. Otherwise, no acute complaints at this time.     Chief Complaint:  Follow-up    Objective   Vitals:    05/08/25 1149   BP: 136/73   Pulse:    Temp:    SpO2:        Vitals reviewed.   Constitutional:       Appearance: Healthy appearance. Not in distress.   Pulmonary:      Effort: Pulmonary effort is normal.      Breath sounds: Bilateral Wheezing present.   Cardiovascular:      Normal rate.   Edema:     Peripheral edema absent.   Abdominal:      General: There is no distension.      Palpations: Abdomen is soft.      Tenderness: There is no abdominal tenderness.   Musculoskeletal: Normal range of motion.         General: No tenderness. Skin:     General: Skin is warm.   Neurological:      General: No focal deficit present.      Mental Status: Alert and oriented to person, place and time.       RFP  Recent Labs     10/22/24  0601 10/21/24  0527 10/20/24  0525 10/19/24  1850 10/18/24  0204 10/17/24  1154 10/17/24  0451    136 136 138 141 141 141   K 3.9 4.0 3.7 3.7 3.8 3.7 4.1    103 101 101 106 108* 108*   CO2 25 22 27 28 26 24 25   BUN 23 28* 27* 25* 24* 28* 28*   CREATININE 1.80* 1.79* 1.74* 1.74* 2.00* 2.04* 2.03*   GLUCOSE 95 102* 97 93 111* 116* 115*   CALCIUM 8.9 8.7 8.7 8.8 8.2* 8.0* 8.2*   ALBUMIN 3.6 3.5 3.1* 3.3* 3.1* 3.1* 3.1*   PHOS 2.8 2.4* 3.0 1.8* 3.7 3.9 4.8   EGFR 40* 41* 42* 42* 36* 35* 35*   ANIONGAP 15 15 12 13 13 13 12        Urineanalysis  Recent Labs     05/08/24  1554 01/23/24  1309 10/12/23  0903 07/26/23  1011 03/02/22  1520   COLORU Yellow Colorless* Light-Yellow PALE YELLOW YELLOW   APPEARANCEU Hazy* Clear Clear CLEAR CLEAR   SPECGRAVU 1.020 1.006 1.011 1.009 1.018   VANIA 5.0 5.0 5.5 5.5 5.5   PROTUR NEGATIVE NEGATIVE NEGATIVE NEGATIVE 30*  "  GLUCOSEU NEGATIVE Normal Normal NEGATIVE NEGATIVE   BLOODU TRACE-Intact* NEGATIVE NEGATIVE NEGATIVE NEGATIVE   KETONESU TRACE* NEGATIVE NEGATIVE NEGATIVE NEGATIVE   BILIRUBINU NEGATIVE NEGATIVE NEGATIVE NEGATIVE NEGATIVE   NITRITEU NEGATIVE NEGATIVE NEGATIVE NEGATIVE NEGATIVE   LEUKOCYTESU  --  NEGATIVE NEGATIVE NEGATIVE NEGATIVE       Urine Electrolytes  Recent Labs     10/09/24  1409 05/08/24  1554 01/23/24  1309 10/12/23  0903 07/26/23  1011 03/02/22  1520   CREATU 145.8  --   --   --   --   --    PROTUR  --  NEGATIVE NEGATIVE NEGATIVE NEGATIVE 30*   ALBUMINUR 12.1  --   --   --   --   --    MICROALBCREA 8.3  --   --   --   --   --         Urine Micro  Recent Labs     03/02/22  1520   WBCU 1   RBCU 3   HYALCASTU NONE SEEN   SQUAMEPIU NONE SEEN   BACTERIAU NEGATIVE        Iron  Recent Labs     10/09/24  1406   IRON 89   TIBC 326   IRONSAT 27   FERRITIN 104       @Mg@    Lab Results   Component Value Date    WBC 17.1 (H) 10/22/2024    HGB 11.4 (L) 10/22/2024    HCT 32.9 (L) 10/22/2024    MCV 91 10/22/2024     10/22/2024       No results found for: \"CKTOTAL\", \"CKMB\", \"CKMBINDEX\", \"TROPONINI\"    Albumin/Creatinine Ratio   Date Value Ref Range Status   10/09/2024 8.3 <30.0 ug/mg Creat Final       Current Medications[1]     Assessment/Plan   There were no encounter diagnoses.    # Chronic kidney disease stage 3b/A1  # History of bilateral renal stents.  - Baseline SCr between 1.8-1.9 w/ GFR 38  - Most recent Scr in October 2024 was in baseline.  Repeat blood work today  Lab Results   Component Value Date    CREATININE 1.80 (H) 10/22/2024      Blood pressure is acceptable today.  Will reassess kidney function by repeat renal function panel today..  No electrolyte imbalance.  No acidosis.  No proteinuria.  - CKD is most likely related to significant vascular disease/cardiac  -Continue RAAS inhibitors.  Agreeable to resume SGL 2 inhibitors-if affordable (self stopped due to affordability and linking Farxiga to " ruptured spleen)  -Referral placed for clinical pharmacy to assist with co-pay if any earlier  -We discussed if he should be on antiplatelet therapy including aspirin given history of renal stents.  Asked to reach out to his vascular team.    # BP - today at office 136/73 orthostatic vitals negative  - Does not check BP at home  - Current meds: Amlodipine 10 mg daily, chlorthalidone 25 mg daily, losartan 100 mg daily  - No Changes     #Anemia-no acute concern  -Hgb 11-12 in October 2024-repeat    #(CKD)-MBD  -PTH and vitamin D within normal limit    # [facility]  - Currently on atorvastatin 20 mg daily, RAAS inhibitors and potentially ACE inhibitors  -We discussed if he should be on antiplatelet therapy including aspirin given history of renal stents.  Asked to reach out to his vascular team.    #Acidosis   - None    #Others  - No NSAIDs, no contrast as possible. If to be done- we recommend holding ACEi/ARBS/diuretics 24 hrs prior to contrast exposure and ensure appropriate hydration   - Ensure well hydration  - Limit salt in diet  - No smoking; smoked and discussed smoking cessation benefit.     Return to clinic/follow-up in 6 months with repeat blood work and UA prior to next visit.    Gely Wilcox MD, MS, NOEL TAVAREZ   Clinical  - [employer]   Nephrologist - [facility]         [1]   Current Outpatient Medications:     albuterol (Proventil HFA) 90 mcg/actuation inhaler, Inhale 2 puffs 4 times a day. (Patient taking differently: Inhale 2 puffs every 4 hours if needed for shortness of breath.), Disp: 18 g, Rfl: 2    amLODIPine (Norvasc) 10 mg tablet, TAKE 1 TABLET(10 MG) BY MOUTH DAILY, Disp: 90 tablet, Rfl: 2    atorvastatin (Lipitor) 20 mg tablet, TAKE 1 TABLET BY MOUTH DAILY, Disp: 90 tablet, Rfl: 2    chlorthalidone (Hygroton) 25 mg tablet, TAKE 1 TABLET BY MOUTH DAILY, Disp: 90 tablet, Rfl: 2    fluticasone (Flonase) 50  mcg/actuation nasal spray, Administer 1 spray into each nostril once daily. Shake gently. Before first use, prime pump. After use, clean tip and replace cap., Disp: 16 g, Rfl: 1    losartan (Cozaar) 100 mg tablet, TAKE 1 TABLET BY MOUTH DAILY, Disp: 90 tablet, Rfl: 2    empagliflozin (Jardiance) 10 mg tablet, Take 1 tablet (10 mg) by mouth once daily., Disp: 30 tablet, Rfl: 11

## 2025-05-09 LAB
25(OH)D3+25(OH)D2 SERPL-MCNC: 21 NG/ML (ref 30–100)
ALBUMIN SERPL-MCNC: 4.4 G/DL (ref 3.6–5.1)
ALBUMIN SERPL-MCNC: 4.4 G/DL (ref 3.6–5.1)
ALBUMIN/CREAT UR: 18 MG/G CREAT
ALP SERPL-CCNC: 66 U/L (ref 35–144)
ALT SERPL-CCNC: 9 U/L (ref 9–46)
ANION GAP SERPL CALCULATED.4IONS-SCNC: 9 MMOL/L (CALC) (ref 7–17)
AST SERPL-CCNC: 14 U/L (ref 10–35)
BASOPHILS # BLD AUTO: 86 CELLS/UL (ref 0–200)
BASOPHILS NFR BLD AUTO: 0.6 %
BILIRUB SERPL-MCNC: 0.4 MG/DL (ref 0.2–1.2)
BUN SERPL-MCNC: 37 MG/DL (ref 7–25)
BUN SERPL-MCNC: 38 MG/DL (ref 7–25)
BUN/CREAT SERPL: 20 (CALC) (ref 6–22)
CALCIUM SERPL-MCNC: 9.5 MG/DL (ref 8.6–10.3)
CALCIUM SERPL-MCNC: 9.7 MG/DL (ref 8.6–10.3)
CHLORIDE SERPL-SCNC: 104 MMOL/L (ref 98–110)
CHLORIDE SERPL-SCNC: 104 MMOL/L (ref 98–110)
CHOLEST SERPL-MCNC: 177 MG/DL
CHOLEST/HDLC SERPL: 4.3 (CALC)
CO2 SERPL-SCNC: 24 MMOL/L (ref 20–32)
CO2 SERPL-SCNC: 25 MMOL/L (ref 20–32)
CREAT SERPL-MCNC: 1.93 MG/DL (ref 0.7–1.35)
CREAT SERPL-MCNC: 2.03 MG/DL (ref 0.7–1.35)
CREAT UR-MCNC: 89 MG/DL (ref 20–320)
EGFRCR SERPLBLD CKD-EPI 2021: 35 ML/MIN/1.73M2
EGFRCR SERPLBLD CKD-EPI 2021: 37 ML/MIN/1.73M2
EOSINOPHIL # BLD AUTO: 576 CELLS/UL (ref 15–500)
EOSINOPHIL NFR BLD AUTO: 4 %
ERYTHROCYTE [DISTWIDTH] IN BLOOD BY AUTOMATED COUNT: 11.7 % (ref 11–15)
ERYTHROCYTE [DISTWIDTH] IN BLOOD BY AUTOMATED COUNT: 11.8 % (ref 11–15)
EST. AVERAGE GLUCOSE BLD GHB EST-MCNC: 117 MG/DL
EST. AVERAGE GLUCOSE BLD GHB EST-SCNC: 6.5 MMOL/L
FERRITIN SERPL-MCNC: 50 NG/ML (ref 24–380)
GLUCOSE SERPL-MCNC: 103 MG/DL (ref 65–99)
GLUCOSE SERPL-MCNC: 107 MG/DL (ref 65–139)
HBA1C MFR BLD: 5.7 %
HCT VFR BLD AUTO: 39.8 % (ref 38.5–50)
HCT VFR BLD AUTO: 42 % (ref 38.5–50)
HDLC SERPL-MCNC: 41 MG/DL
HGB BLD-MCNC: 14.2 G/DL (ref 13.2–17.1)
HGB BLD-MCNC: 14.5 G/DL (ref 13.2–17.1)
IRON SATN MFR SERPL: 32 % (CALC) (ref 20–48)
IRON SERPL-MCNC: 106 MCG/DL (ref 50–180)
LDLC SERPL CALC-MCNC: 111 MG/DL (CALC)
LYMPHOCYTES # BLD AUTO: 1814 CELLS/UL (ref 850–3900)
LYMPHOCYTES NFR BLD AUTO: 12.6 %
MCH RBC QN AUTO: 34.7 PG (ref 27–33)
MCH RBC QN AUTO: 35.3 PG (ref 27–33)
MCHC RBC AUTO-ENTMCNC: 34.5 G/DL (ref 32–36)
MCHC RBC AUTO-ENTMCNC: 35.7 G/DL (ref 32–36)
MCV RBC AUTO: 100.5 FL (ref 80–100)
MCV RBC AUTO: 99 FL (ref 80–100)
MICROALBUMIN UR-MCNC: 1.6 MG/DL
MONOCYTES # BLD AUTO: 1915 CELLS/UL (ref 200–950)
MONOCYTES NFR BLD AUTO: 13.3 %
NEUTROPHILS # BLD AUTO: ABNORMAL CELLS/UL (ref 1500–7800)
NEUTROPHILS NFR BLD AUTO: 69.5 %
NONHDLC SERPL-MCNC: 136 MG/DL (CALC)
PHOSPHATE SERPL-MCNC: 3.8 MG/DL (ref 2.1–4.3)
PLATELET # BLD AUTO: 288 THOUSAND/UL (ref 140–400)
PLATELET # BLD AUTO: 301 THOUSAND/UL (ref 140–400)
PMV BLD REES-ECKER: 9.7 FL (ref 7.5–12.5)
PMV BLD REES-ECKER: 9.9 FL (ref 7.5–12.5)
POTASSIUM SERPL-SCNC: 3.7 MMOL/L (ref 3.5–5.3)
POTASSIUM SERPL-SCNC: 3.8 MMOL/L (ref 3.5–5.3)
PROT SERPL-MCNC: 7.6 G/DL (ref 6.1–8.1)
PSA SERPL-MCNC: 1.88 NG/ML
PTH-INTACT SERPL-MCNC: 89 PG/ML (ref 16–77)
RBC # BLD AUTO: 4.02 MILLION/UL (ref 4.2–5.8)
RBC # BLD AUTO: 4.18 MILLION/UL (ref 4.2–5.8)
SODIUM SERPL-SCNC: 138 MMOL/L (ref 135–146)
SODIUM SERPL-SCNC: 138 MMOL/L (ref 135–146)
TIBC SERPL-MCNC: 335 MCG/DL (CALC) (ref 250–425)
TRIGL SERPL-MCNC: 134 MG/DL
WBC # BLD AUTO: 14.4 THOUSAND/UL (ref 3.8–10.8)
WBC # BLD AUTO: 14.8 THOUSAND/UL (ref 3.8–10.8)

## 2025-05-16 ENCOUNTER — TELEPHONE (OUTPATIENT)
Dept: NEPHROLOGY | Facility: CLINIC | Age: 70
End: 2025-05-16
Payer: COMMERCIAL

## 2025-05-16 DIAGNOSIS — N18.32 STAGE 3B CHRONIC KIDNEY DISEASE (MULTI): Primary | ICD-10-CM

## 2025-05-16 DIAGNOSIS — I25.10 CARDIOVASCULAR DISEASE: ICD-10-CM

## 2025-05-16 NOTE — PROGRESS NOTES
Patient's daughter called, patient was with her also asking about some confusion around Farxiga and Jardiance.  They are asking him to be restarted on Farxiga but they actually do not think it caused problems and Ludy found out he can afford it after all.   Is it safe for him to resume?  Please advise.  -MO

## 2025-05-18 ASSESSMENT — ENCOUNTER SYMPTOMS
CHILLS: 0
SORE THROAT: 0
FEVER: 0
TROUBLE SWALLOWING: 0
SHORTNESS OF BREATH: 0

## 2025-05-18 NOTE — ASSESSMENT & PLAN NOTE
Orders:    fluticasone (Flonase) 50 mcg/actuation nasal spray; Administer 1 spray into each nostril once daily. Shake gently. Before first use, prime pump. After use, clean tip and replace cap.    CT chest wo IV contrast; Future

## 2025-05-18 NOTE — ASSESSMENT & PLAN NOTE
Due for LDCT, last done over 1 yr ago with stable nodules. CT ordered. He will schedule. Smoking cessation strongly advised. Risk discussed. Time spent on tobacco cessation counseling -  5 min  Orders:    CT chest wo IV contrast; Future

## 2025-05-19 RX ORDER — DAPAGLIFLOZIN 10 MG/1
10 TABLET, FILM COATED ORAL DAILY
Qty: 90 TABLET | Refills: 3 | Status: SHIPPED | OUTPATIENT
Start: 2025-05-19 | End: 2026-05-19

## 2025-05-30 ENCOUNTER — TELEPHONE (OUTPATIENT)
Dept: PRIMARY CARE | Facility: CLINIC | Age: 70
End: 2025-05-30
Payer: COMMERCIAL

## 2025-05-30 DIAGNOSIS — D75.89 MACROCYTOSIS: Primary | ICD-10-CM

## 2025-06-09 ENCOUNTER — HOSPITAL ENCOUNTER (OUTPATIENT)
Dept: RADIOLOGY | Facility: HOSPITAL | Age: 70
Discharge: HOME | End: 2025-06-09
Payer: MEDICARE

## 2025-06-09 DIAGNOSIS — R91.8 LUNG NODULES: ICD-10-CM

## 2025-06-09 DIAGNOSIS — F17.290 CIGAR SMOKER: ICD-10-CM

## 2025-06-09 PROCEDURE — 71250 CT THORAX DX C-: CPT | Performed by: RADIOLOGY

## 2025-06-09 PROCEDURE — 71250 CT THORAX DX C-: CPT

## 2025-06-14 DIAGNOSIS — D75.89 MACROCYTOSIS: ICD-10-CM

## 2025-07-14 ENCOUNTER — TELEPHONE (OUTPATIENT)
Dept: PRIMARY CARE | Facility: CLINIC | Age: 70
End: 2025-07-14

## 2025-07-14 ENCOUNTER — APPOINTMENT (OUTPATIENT)
Dept: PRIMARY CARE | Facility: CLINIC | Age: 70
End: 2025-07-14
Payer: COMMERCIAL

## 2025-07-14 VITALS
HEIGHT: 70 IN | WEIGHT: 162 LBS | HEART RATE: 67 BPM | BODY MASS INDEX: 23.19 KG/M2 | SYSTOLIC BLOOD PRESSURE: 128 MMHG | DIASTOLIC BLOOD PRESSURE: 70 MMHG | OXYGEN SATURATION: 93 %

## 2025-07-14 DIAGNOSIS — R73.01 IMPAIRED FASTING GLUCOSE: ICD-10-CM

## 2025-07-14 DIAGNOSIS — E78.2 MIXED HYPERLIPIDEMIA: ICD-10-CM

## 2025-07-14 DIAGNOSIS — I10 PRIMARY HYPERTENSION: Primary | ICD-10-CM

## 2025-07-14 DIAGNOSIS — Z74.09 IMPAIRED MOBILITY: ICD-10-CM

## 2025-07-14 DIAGNOSIS — Z12.5 PROSTATE CANCER SCREENING: ICD-10-CM

## 2025-07-14 DIAGNOSIS — F17.200 NICOTINE DEPENDENCE WITH CURRENT USE: ICD-10-CM

## 2025-07-14 DIAGNOSIS — N18.31 STAGE 3A CHRONIC KIDNEY DISEASE (CKD) (MULTI): ICD-10-CM

## 2025-07-14 LAB
POC APPEARANCE, URINE: CLEAR
POC BILIRUBIN, URINE: NEGATIVE
POC BLOOD, URINE: NEGATIVE
POC COLOR, URINE: YELLOW
POC GLUCOSE, URINE: ABNORMAL MG/DL
POC KETONES, URINE: NEGATIVE MG/DL
POC LEUKOCYTES, URINE: NEGATIVE
POC NITRITE,URINE: NEGATIVE
POC PH, URINE: 5.5 PH
POC PROTEIN, URINE: NEGATIVE MG/DL
POC SPECIFIC GRAVITY, URINE: 1.01
POC UROBILINOGEN, URINE: 0.2 EU/DL

## 2025-07-14 PROCEDURE — 81003 URINALYSIS AUTO W/O SCOPE: CPT | Performed by: STUDENT IN AN ORGANIZED HEALTH CARE EDUCATION/TRAINING PROGRAM

## 2025-07-14 PROCEDURE — 99406 BEHAV CHNG SMOKING 3-10 MIN: CPT | Performed by: STUDENT IN AN ORGANIZED HEALTH CARE EDUCATION/TRAINING PROGRAM

## 2025-07-14 PROCEDURE — 3074F SYST BP LT 130 MM HG: CPT | Performed by: STUDENT IN AN ORGANIZED HEALTH CARE EDUCATION/TRAINING PROGRAM

## 2025-07-14 PROCEDURE — 1125F AMNT PAIN NOTED PAIN PRSNT: CPT | Performed by: STUDENT IN AN ORGANIZED HEALTH CARE EDUCATION/TRAINING PROGRAM

## 2025-07-14 PROCEDURE — 3078F DIAST BP <80 MM HG: CPT | Performed by: STUDENT IN AN ORGANIZED HEALTH CARE EDUCATION/TRAINING PROGRAM

## 2025-07-14 PROCEDURE — 3008F BODY MASS INDEX DOCD: CPT | Performed by: STUDENT IN AN ORGANIZED HEALTH CARE EDUCATION/TRAINING PROGRAM

## 2025-07-14 PROCEDURE — 99214 OFFICE O/P EST MOD 30 MIN: CPT | Performed by: STUDENT IN AN ORGANIZED HEALTH CARE EDUCATION/TRAINING PROGRAM

## 2025-07-14 PROCEDURE — G2211 COMPLEX E/M VISIT ADD ON: HCPCS | Performed by: STUDENT IN AN ORGANIZED HEALTH CARE EDUCATION/TRAINING PROGRAM

## 2025-07-14 RX ORDER — PREDNISOLONE ACETATE 10 MG/ML
SUSPENSION/ DROPS OPHTHALMIC
COMMUNITY
Start: 2025-06-23

## 2025-07-14 RX ORDER — ATORVASTATIN CALCIUM 40 MG/1
40 TABLET, FILM COATED ORAL DAILY
Qty: 90 TABLET | Refills: 2 | Status: SHIPPED | OUTPATIENT
Start: 2025-07-14

## 2025-07-14 RX ORDER — PANTOPRAZOLE SODIUM 40 MG/1
1 TABLET, DELAYED RELEASE ORAL
COMMUNITY
Start: 2025-06-27

## 2025-07-14 ASSESSMENT — PAIN SCALES - GENERAL: PAINLEVEL_OUTOF10: 3

## 2025-07-14 NOTE — PROGRESS NOTES
"                                                                                                                 GENERAL PROGRESS NOTE    Chief Complaint   Patient presents with    Hypertension       HPI  Facundo Youngblood is a 70 y.o. male that presents today for interval visit.     Had colonoscopy 5/2025 with Dr. Guzmán - TA x 1, sessile serrated adenoma x 1. Repeat due 3 yrs  Saw nephrology 5/2025 - started Jardiance but was too expensive so went back to Farxiga.       CHRONIC CONDITIONS:  #HTN - losartan 100mg, amlodipine 10mg, chlorthalidone 25mg. Normal readings when checks at home. BP today 128/70  #HLD - atorvastatin 20mg  #CKD3 - Continues on Farxiga. Stable renal function on last labs. Under nephrology  #IFG - A1c 5.7 5/2025  #Smoker - Continues to smoke cigars - 4 swishers daily   #Lung nodules - CT 6/10/35 -  Stable 3-4mm nodule RML  #COPD - declines inhalers  #RAJAT - s/p stenting. Sees vascular and nephrology   #Thoracic aortic aneurysm - s/p endovascular repair with Dr. Desir 2/2023. Now under Dr. 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   Eyes:  Negative for visual disturbance.   Respiratory:  Negative for shortness of breath.    Cardiovascular:  Negative for chest pain, palpitations and leg swelling.   Neurological:  Negative for headaches.        Physical Exam  Constitutional:       Appearance: Normal appearance.     Cardiovascular:      Rate and Rhythm: Normal rate and regular rhythm.      Heart sounds: Normal heart sounds.   Pulmonary:      Effort: Pulmonary effort " is normal.      Breath sounds: Normal breath sounds. No wheezing, rhonchi or rales.     Musculoskeletal:      Right lower leg: No edema.      Left lower leg: No edema.     Neurological:      Mental Status: He is alert.         Assessment & Plan  Primary hypertension  Well controlled on current meds, no change in management.   Labs reviewed and discussed. Stable renal function  Continue dietary efforts and physical activity  Repeat in 6mos    Orders:    POCT UA Automated manually resulted    Mixed hyperlipidemia  Continue medication. Recommend healthy diet  that includes lean proteins, vegetables, fruits, and whole grains.  Limit saturated fats, excess sodium, and processed foods.  Limit sugary drinks and sweets.  Moderate exercise at least 30 minutes per day, 5 days per week.    Orders:    atorvastatin (Lipitor) 40 mg tablet; Take 1 tablet (40 mg) by mouth once daily.    Stage 3a chronic kidney disease (CKD) (Multi)  Low but stable renal function. Continue follow up with nephrology.        Impaired mobility    Orders:    Disability Placard    Nicotine dependence with current use             Orders Placed This Encounter   Procedures    POCT UA Automated manually resulted     Release result to [facility]:   Immediate [1]     Isabel Chiu MD  07/14/25  9:57 AM      Tobacco Counseling  3 - 5 minutes were spent counseling the patient on tobacco cessation.  Benefits of cessation were discussed as well as techniques to help quit. Not interested at this time.        [1]   Current Outpatient Medications   Medication Sig Dispense Refill    albuterol (Proventil HFA) 90 mcg/actuation inhaler Inhale 2 puffs 4 times a day. 18 g 2    amLODIPine (Norvasc) 10 mg tablet TAKE 1 TABLET(10 MG) BY MOUTH DAILY 90 tablet 2    atorvastatin (Lipitor) 20 mg tablet TAKE 1 TABLET BY MOUTH DAILY 90 tablet 2    dapagliflozin propanediol (Farxiga) 10 mg tablet Take 1 tablet (10 mg) by mouth once daily. 90 tablet 3    fluticasone (Flonase) 50  mcg/actuation nasal spray Administer 1 spray into each nostril once daily. Shake gently. Before first use, prime pump. After use, clean tip and replace cap. 16 g 1    losartan (Cozaar) 100 mg tablet TAKE 1 TABLET BY MOUTH DAILY 90 tablet 2    pantoprazole (ProtoNix) 40 mg EC tablet Take 1 tablet (40 mg) by mouth early in the morning..      prednisoLONE acetate (Pred-Forte) 1 % ophthalmic suspension INSTILL 1 DROP IN THE RIGHT EYE TWICE DAILY FOR 1 WEEK      chlorthalidone (Hygroton) 25 mg tablet TAKE 1 TABLET BY MOUTH DAILY (Patient not taking: Reported on 7/14/2025) 90 tablet 2    empagliflozin (Jardiance) 10 mg tablet Take 1 tablet (10 mg) by mouth once daily. 30 tablet 11     No current facility-administered medications for this visit.

## 2025-07-14 NOTE — PATIENT INSTRUCTIONS
Call your vascular provider about your leg pain - [phone]. You see Anthony Tellez    Make appointment with orthopedics for your shoulder - [phone]

## 2025-07-24 ASSESSMENT — ENCOUNTER SYMPTOMS
FEVER: 0
CHILLS: 0
SHORTNESS OF BREATH: 0
PALPITATIONS: 0
HEADACHES: 0

## 2025-07-25 NOTE — ASSESSMENT & PLAN NOTE
Well controlled on current meds, no change in management.   Labs reviewed and discussed. Stable renal function  Continue dietary efforts and physical activity  Repeat in 6mos    Orders:    POCT UA Automated manually resulted

## 2025-07-25 NOTE — ASSESSMENT & PLAN NOTE
Continue medication. Recommend healthy diet  that includes lean proteins, vegetables, fruits, and whole grains.  Limit saturated fats, excess sodium, and processed foods.  Limit sugary drinks and sweets.  Moderate exercise at least 30 minutes per day, 5 days per week.    Orders:    atorvastatin (Lipitor) 40 mg tablet; Take 1 tablet (40 mg) by mouth once daily.

## 2025-08-17 DIAGNOSIS — H69.92 DYSFUNCTION OF LEFT EUSTACHIAN TUBE: ICD-10-CM

## 2025-08-17 DIAGNOSIS — R91.8 LUNG NODULES: ICD-10-CM

## 2025-08-17 DIAGNOSIS — R09.82 PND (POST-NASAL DRIP): ICD-10-CM

## 2025-08-18 RX ORDER — FLUTICASONE PROPIONATE 50 MCG
SPRAY, SUSPENSION (ML) NASAL
Qty: 16 G | Refills: 0 | Status: SHIPPED | OUTPATIENT
Start: 2025-08-18

## 2025-08-27 ENCOUNTER — OFFICE VISIT (OUTPATIENT)
Dept: VASCULAR SURGERY | Facility: CLINIC | Age: 70
End: 2025-08-27
Payer: MEDICARE

## 2025-08-27 VITALS
DIASTOLIC BLOOD PRESSURE: 65 MMHG | HEIGHT: 70 IN | HEART RATE: 82 BPM | SYSTOLIC BLOOD PRESSURE: 110 MMHG | BODY MASS INDEX: 23.55 KG/M2 | WEIGHT: 164.5 LBS | OXYGEN SATURATION: 95 %

## 2025-08-27 DIAGNOSIS — I73.9 PAD (PERIPHERAL ARTERY DISEASE): Primary | ICD-10-CM

## 2025-08-27 DIAGNOSIS — I65.23 ASYMPTOMATIC BILATERAL CAROTID ARTERY STENOSIS: ICD-10-CM

## 2025-08-27 PROCEDURE — 1159F MED LIST DOCD IN RCRD: CPT | Performed by: NURSE PRACTITIONER

## 2025-08-27 PROCEDURE — 3078F DIAST BP <80 MM HG: CPT | Performed by: NURSE PRACTITIONER

## 2025-08-27 PROCEDURE — 99213 OFFICE O/P EST LOW 20 MIN: CPT | Performed by: NURSE PRACTITIONER

## 2025-08-27 PROCEDURE — 3074F SYST BP LT 130 MM HG: CPT | Performed by: NURSE PRACTITIONER

## 2025-08-27 PROCEDURE — 1126F AMNT PAIN NOTED NONE PRSNT: CPT | Performed by: NURSE PRACTITIONER

## 2025-08-27 PROCEDURE — 3008F BODY MASS INDEX DOCD: CPT | Performed by: NURSE PRACTITIONER

## 2025-08-27 RX ORDER — NAPROXEN SODIUM 220 MG/1
81 TABLET, FILM COATED ORAL ONCE
Status: SHIPPED | OUTPATIENT
Start: 2025-08-27

## 2025-08-27 ASSESSMENT — PATIENT HEALTH QUESTIONNAIRE - PHQ9
SUM OF ALL RESPONSES TO PHQ9 QUESTIONS 1 AND 2: 0
2. FEELING DOWN, DEPRESSED OR HOPELESS: NOT AT ALL
1. LITTLE INTEREST OR PLEASURE IN DOING THINGS: NOT AT ALL

## 2025-08-27 ASSESSMENT — PAIN SCALES - GENERAL: PAINLEVEL_OUTOF10: 0-NO PAIN

## 2025-08-28 DIAGNOSIS — I10 PRIMARY HYPERTENSION: ICD-10-CM

## 2025-08-29 RX ORDER — CHLORTHALIDONE 25 MG/1
25 TABLET ORAL DAILY
Qty: 90 TABLET | Refills: 2 | Status: SHIPPED | OUTPATIENT
Start: 2025-08-29

## 2025-08-29 RX ORDER — LOSARTAN POTASSIUM 100 MG/1
100 TABLET ORAL DAILY
Qty: 90 TABLET | Refills: 2 | Status: SHIPPED | OUTPATIENT
Start: 2025-08-29

## 2025-08-29 RX ORDER — AMLODIPINE BESYLATE 10 MG/1
10 TABLET ORAL DAILY
Qty: 90 TABLET | Refills: 2 | Status: SHIPPED | OUTPATIENT
Start: 2025-08-29

## 2025-11-26 ENCOUNTER — APPOINTMENT (OUTPATIENT)
Dept: NEPHROLOGY | Facility: CLINIC | Age: 70
End: 2025-11-26
Payer: COMMERCIAL

## 2026-01-14 ENCOUNTER — APPOINTMENT (OUTPATIENT)
Dept: PRIMARY CARE | Facility: CLINIC | Age: 71
End: 2026-01-14
Payer: COMMERCIAL

## 2026-02-12 ENCOUNTER — APPOINTMENT (OUTPATIENT)
Dept: VASCULAR MEDICINE | Facility: CLINIC | Age: 71
End: 2026-02-12
Payer: COMMERCIAL

## 2026-02-18 ENCOUNTER — APPOINTMENT (OUTPATIENT)
Dept: VASCULAR SURGERY | Facility: CLINIC | Age: 71
End: 2026-02-18
Payer: COMMERCIAL

## 2026-02-18 ENCOUNTER — APPOINTMENT (OUTPATIENT)
Dept: VASCULAR MEDICINE | Facility: CLINIC | Age: 71
End: 2026-02-18
Payer: COMMERCIAL

## (undated) DEVICE — Device

## (undated) DEVICE — MANIFOLD, 4 PORT NEPTUNE STANDARD

## (undated) DEVICE — SPONGE, LAP, XRAY DECT, 18IN X 18IN, W/LOOP, STERILE

## (undated) DEVICE — STAPLER,  LINEAR RELOAD, 60MM, WHITE, DISP

## (undated) DEVICE — DRESSING, PREVENA, PEEL AND PLACE, 20CM

## (undated) DEVICE — APPLIER,  LIGACLIP MULTI CLIP, 30 MED 11 1/2

## (undated) DEVICE — LIGASURE IMPACT, 18CM

## (undated) DEVICE — STAPLER, ECHELON 3000, 60MM STD

## (undated) DEVICE — THERAPY UNIT, PREVENA PLUS 125

## (undated) DEVICE — SEALANT, HEMOSTATIC, FLOSEAL, 10 ML

## (undated) DEVICE — SPONGE, HEMOSTATIC, CELLULOSE, SURGICEL, 2 X 14 IN